# Patient Record
Sex: MALE | NOT HISPANIC OR LATINO | Employment: UNEMPLOYED | URBAN - METROPOLITAN AREA
[De-identification: names, ages, dates, MRNs, and addresses within clinical notes are randomized per-mention and may not be internally consistent; named-entity substitution may affect disease eponyms.]

---

## 2020-03-19 ENCOUNTER — APPOINTMENT (OUTPATIENT)
Dept: GENERAL RADIOLOGY | Facility: CLINIC | Age: 60
End: 2020-03-19
Attending: EMERGENCY MEDICINE
Payer: COMMERCIAL

## 2020-03-19 ENCOUNTER — HOSPITAL ENCOUNTER (EMERGENCY)
Facility: CLINIC | Age: 60
Discharge: HOME | End: 2020-03-19
Attending: EMERGENCY MEDICINE | Admitting: EMERGENCY MEDICINE
Payer: COMMERCIAL

## 2020-03-19 VITALS
DIASTOLIC BLOOD PRESSURE: 65 MMHG | SYSTOLIC BLOOD PRESSURE: 105 MMHG | HEIGHT: 70 IN | HEART RATE: 67 BPM | BODY MASS INDEX: 22.9 KG/M2 | WEIGHT: 160 LBS | OXYGEN SATURATION: 96 % | TEMPERATURE: 97.6 F | RESPIRATION RATE: 19 BRPM

## 2020-03-19 DIAGNOSIS — R07.89 ATYPICAL CHEST PAIN: ICD-10-CM

## 2020-03-19 DIAGNOSIS — E87.6 HYPOKALEMIA: ICD-10-CM

## 2020-03-19 DIAGNOSIS — R05.3 CHRONIC COUGH: ICD-10-CM

## 2020-03-19 LAB
ALBUMIN SERPL-MCNC: 3.7 G/DL (ref 3.4–5)
ALP SERPL-CCNC: 84 U/L (ref 40–150)
ALT SERPL W P-5'-P-CCNC: 88 U/L (ref 0–70)
ANION GAP SERPL CALCULATED.3IONS-SCNC: 9 MMOL/L (ref 3–14)
AST SERPL W P-5'-P-CCNC: 143 U/L (ref 0–45)
BASOPHILS # BLD AUTO: 0.1 10E9/L (ref 0–0.2)
BASOPHILS NFR BLD AUTO: 2.5 %
BILIRUB SERPL-MCNC: 0.2 MG/DL (ref 0.2–1.3)
BUN SERPL-MCNC: 7 MG/DL (ref 7–30)
CALCIUM SERPL-MCNC: 8.4 MG/DL (ref 8.5–10.1)
CHLORIDE SERPL-SCNC: 105 MMOL/L (ref 94–109)
CO2 SERPL-SCNC: 26 MMOL/L (ref 20–32)
CREAT SERPL-MCNC: 0.57 MG/DL (ref 0.66–1.25)
DIFFERENTIAL METHOD BLD: ABNORMAL
EOSINOPHIL # BLD AUTO: 0.1 10E9/L (ref 0–0.7)
EOSINOPHIL NFR BLD AUTO: 1.6 %
ERYTHROCYTE [DISTWIDTH] IN BLOOD BY AUTOMATED COUNT: 15.1 % (ref 10–15)
GFR SERPL CREATININE-BSD FRML MDRD: >90 ML/MIN/{1.73_M2}
GLUCOSE SERPL-MCNC: 116 MG/DL (ref 70–99)
HCT VFR BLD AUTO: 39.5 % (ref 40–53)
HGB BLD-MCNC: 13.1 G/DL (ref 13.3–17.7)
IMM GRANULOCYTES # BLD: 0 10E9/L (ref 0–0.4)
IMM GRANULOCYTES NFR BLD: 0.3 %
INTERPRETATION ECG - MUSE: NORMAL
LIPASE SERPL-CCNC: 902 U/L (ref 73–393)
LYMPHOCYTES # BLD AUTO: 1.7 10E9/L (ref 0.8–5.3)
LYMPHOCYTES NFR BLD AUTO: 52.2 %
MCH RBC QN AUTO: 31.7 PG (ref 26.5–33)
MCHC RBC AUTO-ENTMCNC: 33.2 G/DL (ref 31.5–36.5)
MCV RBC AUTO: 96 FL (ref 78–100)
MONOCYTES # BLD AUTO: 0.2 10E9/L (ref 0–1.3)
MONOCYTES NFR BLD AUTO: 6.3 %
NEUTROPHILS # BLD AUTO: 1.2 10E9/L (ref 1.6–8.3)
NEUTROPHILS NFR BLD AUTO: 37.1 %
NRBC # BLD AUTO: 0 10*3/UL
NRBC BLD AUTO-RTO: 0 /100
PLATELET # BLD AUTO: 233 10E9/L (ref 150–450)
POTASSIUM SERPL-SCNC: 2.6 MMOL/L (ref 3.4–5.3)
POTASSIUM SERPL-SCNC: 3.5 MMOL/L (ref 3.4–5.3)
PROT SERPL-MCNC: 8 G/DL (ref 6.8–8.8)
RBC # BLD AUTO: 4.13 10E12/L (ref 4.4–5.9)
SODIUM SERPL-SCNC: 140 MMOL/L (ref 133–144)
TROPONIN I SERPL-MCNC: <0.015 UG/L (ref 0–0.04)
TROPONIN I SERPL-MCNC: <0.015 UG/L (ref 0–0.04)
WBC # BLD AUTO: 3.2 10E9/L (ref 4–11)

## 2020-03-19 PROCEDURE — 80053 COMPREHEN METABOLIC PANEL: CPT | Performed by: EMERGENCY MEDICINE

## 2020-03-19 PROCEDURE — 96365 THER/PROPH/DIAG IV INF INIT: CPT | Performed by: EMERGENCY MEDICINE

## 2020-03-19 PROCEDURE — 84484 ASSAY OF TROPONIN QUANT: CPT | Performed by: EMERGENCY MEDICINE

## 2020-03-19 PROCEDURE — 83690 ASSAY OF LIPASE: CPT | Performed by: EMERGENCY MEDICINE

## 2020-03-19 PROCEDURE — 25000128 H RX IP 250 OP 636: Performed by: EMERGENCY MEDICINE

## 2020-03-19 PROCEDURE — 99285 EMERGENCY DEPT VISIT HI MDM: CPT | Mod: 25 | Performed by: EMERGENCY MEDICINE

## 2020-03-19 PROCEDURE — 71045 X-RAY EXAM CHEST 1 VIEW: CPT

## 2020-03-19 PROCEDURE — 25000132 ZZH RX MED GY IP 250 OP 250 PS 637: Performed by: EMERGENCY MEDICINE

## 2020-03-19 PROCEDURE — 85025 COMPLETE CBC W/AUTO DIFF WBC: CPT | Performed by: EMERGENCY MEDICINE

## 2020-03-19 PROCEDURE — 84132 ASSAY OF SERUM POTASSIUM: CPT | Performed by: EMERGENCY MEDICINE

## 2020-03-19 PROCEDURE — 93010 ELECTROCARDIOGRAM REPORT: CPT | Mod: Z6 | Performed by: EMERGENCY MEDICINE

## 2020-03-19 PROCEDURE — 93005 ELECTROCARDIOGRAM TRACING: CPT | Performed by: EMERGENCY MEDICINE

## 2020-03-19 RX ORDER — POTASSIUM CHLORIDE 7.45 MG/ML
10 INJECTION INTRAVENOUS CONTINUOUS
Status: DISCONTINUED | OUTPATIENT
Start: 2020-03-19 | End: 2020-03-19 | Stop reason: HOSPADM

## 2020-03-19 RX ORDER — POTASSIUM CHLORIDE 20MEQ/15ML
40 LIQUID (ML) ORAL ONCE
Status: COMPLETED | OUTPATIENT
Start: 2020-03-19 | End: 2020-03-19

## 2020-03-19 RX ADMIN — POTASSIUM CHLORIDE 10 MEQ: 7.46 INJECTION, SOLUTION INTRAVENOUS at 02:11

## 2020-03-19 RX ADMIN — POTASSIUM CHLORIDE 40 MEQ: 20 SOLUTION ORAL at 01:19

## 2020-03-19 ASSESSMENT — MIFFLIN-ST. JEOR: SCORE: 1547.01

## 2020-03-19 NOTE — ED AVS SNAPSHOT
[facility], [place], Emergency Department  [address] [place] [id number]  Phone:  [phone]                                    Dallas Deluca   MRN: [id number]    Department:  [facility], Emergency Department   Date of Visit:  3/19/2020           After Visit Summary Signature Page    I have received my discharge instructions, and my questions have been answered. I have discussed any challenges I see with this plan with the nurse or doctor.    ..........................................................................................................................................  Patient/Patient Representative Signature      ..........................................................................................................................................  Patient Representative Print Name and Relationship to Patient    ..................................................               ................................................  Date                                   Time    ..........................................................................................................................................  Reviewed by Signature/Title    ...................................................              ..............................................  Date                                               Time          22EPIC Rev 08/18

## 2020-03-19 NOTE — DISCHARGE INSTRUCTIONS
Continue your potassium supplements.     Please make an appointment to follow up with Your Primary Care Provider in 2-4 days.     Return to the ED if you are having worsening symptoms, or any urgent/life-threatening concerns.

## 2020-03-19 NOTE — ED NOTES
Bed: ED25  Expected date:   Expected time:   Means of arrival:   Comments:  SP20  69 yr M  Chest pain

## 2020-03-19 NOTE — ED PROVIDER NOTES
"  History     Chief Complaint   Patient presents with     Chest Pain     Cough     HPI  Dallas Deluca is a 59 year old male with PMH notable for pancreatitis, hypokalemia, COPD who presents to the ED with chest pain. Patient reports pain started this past morning, has been constant. No clear aggravating or alleviating factors. Pain is reproduced when pushing on his chest. Patient reports chronic cough for at least several weeks, unchanged. No fevers. He notes recent admission for pancreatitis, which is now resolved. No abdominal pain nor vomiting. Breathing feels mildly short.     Past Medical History  History reviewed. No pertinent past medical history.  History reviewed. No pertinent surgical history.  No current outpatient medications on file.    Allergies   Allergen Reactions     Lisinopril      Morphine      Past medical history, past surgical history, medications, and allergies were reviewed with the patient. Additional pertinent items: pancreatitis, COPD, hypokalemia    Family History  History reviewed. No pertinent family history.  Family history was reviewed with the patient. Additional pertinent items: None    Social History  Social History     Tobacco Use     Smoking status: Current Every Day Smoker     Packs/day: 0.25     Smokeless tobacco: Never Used     Tobacco comment: 3 cigarettes/day   Substance Use Topics     Alcohol use: Yes     Alcohol/week: 10.0 standard drinks     Types: 10 Cans of beer per week     Drug use: Never      Social history was reviewed with the patient. Additional pertinent items: None    Review of Systems  A complete review of systems was performed with pertinent positives and negatives noted in the HPI, and all other systems negative.     Physical Exam   BP: (!) 144/98  Pulse: 62  Heart Rate: 86  Temp: 97.6  F (36.4  C)  Resp: 16  Height: 177.8 cm (5' 10\")  Weight: 72.6 kg (160 lb)  SpO2: 98 %    Physical Exam  General: no acute distress. Appears stated age.   HENT: MMM, no " oropharyngeal lesions  Eyes: PERRL, normal sclerae  Cardio: regular rate. Regular rhythm. No murmur, no rubs. Extremities well perfused  Resp: Normal work of breathing, clear breath sounds  Chest/Back: no visual signs of trauma, no CVA tenderness. There is reproduction of symptoms with anterior chest palpation.   Abdomen: no tenderness, non-distended, no rebound, no guarding  Neuro: alert and fully oriented. CN II-XII grossly intact. Grossly normal strength and sensation in all extremities.   MSK: no deformities.   Integumentary/Skin: no rash visualized, normal color  Psych: normal affect, normal behavior    ED Course      Procedures             EKG Interpretation:      Interpreted by Stephen Nuno MD  Time reviewed: 0114  Symptoms at time of EKG: chest pain and cough   Rhythm: normal sinus   Rate: normal  Axis: normal  Ectopy: none  Conduction: normal  ST Segments/ T Waves: No ST-T wave changes  Q Waves: none  Comparison to prior: Unchanged from 11/11/2007    Clinical Impression: normal EKG     Labs Ordered and Resulted from Time of ED Arrival Up to the Time of Departure from the ED   COMPREHENSIVE METABOLIC PANEL - Abnormal; Notable for the following components:       Result Value    Potassium 2.6 (*)     Glucose 116 (*)     Creatinine 0.57 (*)     Calcium 8.4 (*)     ALT 88 (*)      (*)     All other components within normal limits   CBC WITH PLATELETS DIFFERENTIAL - Abnormal; Notable for the following components:    WBC 3.2 (*)     RBC Count 4.13 (*)     Hemoglobin 13.1 (*)     Hematocrit 39.5 (*)     RDW 15.1 (*)     Absolute Neutrophil 1.2 (*)     All other components within normal limits   LIPASE - Abnormal; Notable for the following components:    Lipase 902 (*)     All other components within normal limits   TROPONIN I   POTASSIUM   TROPONIN I   CARDIAC CONTINUOUS MONITORING     XR Chest Port 1 View   Final Result   IMPRESSION: Cardiomediastinal silhouette within normal limits. No focal  consolidation or pleural effusion. Remote rib fractures.             Assessments & Plan (with Medical Decision Making)   Patient presenting with chest pain and cough. Vitals in the ED unremarkable. Nursing notes reviewed. Initial differential diagnosis includes but not limited to ACS, GERD, COPD, pancreatitis, gastritis.     ECG showed NSR without evidence of acute ischemia, troponin negative x2 - ACS very unlikely. CXR without evidence of pneumonia, PTX, nor other visualized acute pathology. Normal work of breathing and clear lungs on exam.     Labs notable for K 2.6 - supplementation started. Patient does chronically have low potassium and reports taking supplements for it. Repeat K 3.5.     After counseling on the diagnosis, work-up, and treatment plan, the patient was discharged. The patient was advised to follow-up with primary care in a few days. The patient was advised to return to the ED if worsening symptoms, or if there are any urgent/life-threatening concerns.     Final diagnoses:   Atypical chest pain   Chronic cough   Hypokalemia     There are no discharge medications for this patient.      --  Stephen Nuno MD   Emergency Medicine   [facility], [facility] EMERGENCY DEPARTMENT  3/19/2020     Stephen Nuno MD  03/19/20 1715

## 2021-07-20 PROBLEM — Z87.898 HISTORY OF SEIZURE: Status: ACTIVE | Noted: 2017-05-25

## 2021-07-20 PROBLEM — D33.2 BRAIN TUMOR (BENIGN) (H): Status: ACTIVE | Noted: 2017-05-25

## 2021-07-20 PROBLEM — F10.20 ALCOHOL USE DISORDER, SEVERE, DEPENDENCE (H): Status: ACTIVE | Noted: 2017-05-24

## 2022-04-04 ENCOUNTER — ALLIED HEALTH/NURSE VISIT (OUTPATIENT)
Dept: FAMILY MEDICINE | Facility: CLINIC | Age: 62
End: 2022-04-04
Payer: COMMERCIAL

## 2022-04-04 DIAGNOSIS — Z71.9 COUNSELED BY NURSE: Primary | ICD-10-CM

## 2022-04-04 PROCEDURE — 99207 PR NO CHARGE NURSE ONLY: CPT

## 2022-04-04 NOTE — PROGRESS NOTES
Some time over the weekend, pt suffered a petit mal seizure in the first floor entryway of the Shelter. Shift notes included a note requesting an RN visit today.    This RN visited pt in his room. Pt was relaxing in bed (watching TV). Pt denied needing any medical assistance and endorsed feeling well today. Pt related that he had had a petit mal seizure, but he's on medication for this and has been managing it since the 1990's.    Pt expressed appreciation for the visit. RN and pt chatted for 20 min. Pt seems to be managing his seizure medication regimen effectively overall, and is in good spirits: he's going to move back in with his wife soon.    Markie Sosa RN  [facility] RN Hub

## 2022-12-06 ENCOUNTER — APPOINTMENT (OUTPATIENT)
Dept: GENERAL RADIOLOGY | Facility: CLINIC | Age: 62
End: 2022-12-06
Attending: EMERGENCY MEDICINE
Payer: COMMERCIAL

## 2022-12-06 ENCOUNTER — APPOINTMENT (OUTPATIENT)
Dept: CT IMAGING | Facility: CLINIC | Age: 62
End: 2022-12-06
Attending: INTERNAL MEDICINE
Payer: COMMERCIAL

## 2022-12-06 ENCOUNTER — HOSPITAL ENCOUNTER (INPATIENT)
Facility: CLINIC | Age: 62
LOS: 19 days | Discharge: HOME | End: 2022-12-25
Attending: EMERGENCY MEDICINE | Admitting: INTERNAL MEDICINE
Payer: COMMERCIAL

## 2022-12-06 ENCOUNTER — APPOINTMENT (OUTPATIENT)
Dept: CT IMAGING | Facility: CLINIC | Age: 62
End: 2022-12-06
Attending: EMERGENCY MEDICINE
Payer: COMMERCIAL

## 2022-12-06 ENCOUNTER — APPOINTMENT (OUTPATIENT)
Dept: CARDIOLOGY | Facility: CLINIC | Age: 62
End: 2022-12-06
Attending: INTERNAL MEDICINE
Payer: COMMERCIAL

## 2022-12-06 DIAGNOSIS — K21.9 GASTROESOPHAGEAL REFLUX DISEASE, UNSPECIFIED WHETHER ESOPHAGITIS PRESENT: ICD-10-CM

## 2022-12-06 DIAGNOSIS — F10.20 ALCOHOL USE DISORDER, SEVERE, DEPENDENCE (H): ICD-10-CM

## 2022-12-06 DIAGNOSIS — K22.89 ESOPHAGEAL THICKENING: ICD-10-CM

## 2022-12-06 DIAGNOSIS — R07.89 ATYPICAL CHEST PAIN: ICD-10-CM

## 2022-12-06 DIAGNOSIS — K86.89 PANCREATIC INSUFFICIENCY: ICD-10-CM

## 2022-12-06 DIAGNOSIS — E87.29 ALCOHOLIC KETOACIDOSIS: ICD-10-CM

## 2022-12-06 DIAGNOSIS — E61.1 IRON DEFICIENCY: Primary | ICD-10-CM

## 2022-12-06 DIAGNOSIS — Z11.52 ENCOUNTER FOR SCREENING LABORATORY TESTING FOR SEVERE ACUTE RESPIRATORY SYNDROME CORONAVIRUS 2 (SARS-COV-2): ICD-10-CM

## 2022-12-06 DIAGNOSIS — E11.69 TYPE 2 DIABETES MELLITUS WITH OTHER SPECIFIED COMPLICATION, WITHOUT LONG-TERM CURRENT USE OF INSULIN (H): ICD-10-CM

## 2022-12-06 LAB
ALBUMIN SERPL BCG-MCNC: 4.2 G/DL (ref 3.5–5.2)
ALBUMIN UR-MCNC: 30 MG/DL
ALP SERPL-CCNC: 110 U/L (ref 40–129)
ALT SERPL W P-5'-P-CCNC: 52 U/L (ref 10–50)
AMPHETAMINES UR QL SCN: NORMAL
ANION GAP SERPL CALCULATED.3IONS-SCNC: 12 MMOL/L (ref 3–14)
ANION GAP SERPL CALCULATED.3IONS-SCNC: 19 MMOL/L (ref 7–15)
ANION GAP SERPL CALCULATED.3IONS-SCNC: 35 MMOL/L (ref 7–15)
APPEARANCE UR: CLEAR
AST SERPL W P-5'-P-CCNC: 123 U/L (ref 10–50)
ATRIAL RATE - MUSE: 74 BPM
BARBITURATES UR QL SCN: NORMAL
BASOPHILS # BLD AUTO: 0.1 10E3/UL (ref 0–0.2)
BASOPHILS NFR BLD AUTO: 2 %
BENZODIAZ UR QL SCN: NORMAL
BILIRUB SERPL-MCNC: 0.7 MG/DL
BILIRUB UR QL STRIP: NEGATIVE
BUN SERPL-MCNC: 11.2 MG/DL (ref 8–23)
BUN SERPL-MCNC: 8 MG/DL (ref 7–30)
BUN SERPL-MCNC: 8.8 MG/DL (ref 8–23)
BZE UR QL SCN: NORMAL
CALCIUM SERPL-MCNC: 7.4 MG/DL (ref 8.8–10.2)
CALCIUM SERPL-MCNC: 7.8 MG/DL (ref 8.5–10.1)
CALCIUM SERPL-MCNC: 8.7 MG/DL (ref 8.8–10.2)
CANNABINOIDS UR QL SCN: NORMAL
CHLORIDE BLD-SCNC: 104 MMOL/L (ref 94–109)
CHLORIDE SERPL-SCNC: 101 MMOL/L (ref 98–107)
CHLORIDE SERPL-SCNC: 96 MMOL/L (ref 98–107)
CO2 SERPL-SCNC: 21 MMOL/L (ref 20–32)
COLOR UR AUTO: YELLOW
CREAT SERPL-MCNC: 0.53 MG/DL (ref 0.66–1.25)
CREAT SERPL-MCNC: 0.56 MG/DL (ref 0.67–1.17)
CREAT SERPL-MCNC: 0.57 MG/DL (ref 0.67–1.17)
DEPRECATED HCO3 PLAS-SCNC: 12 MMOL/L (ref 22–29)
DEPRECATED HCO3 PLAS-SCNC: 16 MMOL/L (ref 22–29)
DIASTOLIC BLOOD PRESSURE - MUSE: NORMAL MMHG
EOSINOPHIL # BLD AUTO: 0 10E3/UL (ref 0–0.7)
EOSINOPHIL NFR BLD AUTO: 0 %
ERYTHROCYTE [DISTWIDTH] IN BLOOD BY AUTOMATED COUNT: 20.6 % (ref 10–15)
ETHANOL SERPL-MCNC: 0.33 G/DL
GFR SERPL CREATININE-BSD FRML MDRD: >90 ML/MIN/1.73M2
GLUCOSE BLD-MCNC: 172 MG/DL (ref 70–99)
GLUCOSE BLDC GLUCOMTR-MCNC: 122 MG/DL (ref 70–99)
GLUCOSE SERPL-MCNC: 101 MG/DL (ref 70–99)
GLUCOSE SERPL-MCNC: 155 MG/DL (ref 70–99)
GLUCOSE UR STRIP-MCNC: NEGATIVE MG/DL
HCT VFR BLD AUTO: 32.4 % (ref 40–53)
HGB BLD-MCNC: 9.4 G/DL (ref 13.3–17.7)
HGB UR QL STRIP: NEGATIVE
HOLD SPECIMEN: NORMAL
IMM GRANULOCYTES # BLD: 0 10E3/UL
IMM GRANULOCYTES NFR BLD: 1 %
INTERPRETATION ECG - MUSE: NORMAL
KETONES BLD-SCNC: 6.3 MMOL/L (ref 0–0.6)
KETONES UR STRIP-MCNC: 100 MG/DL
LEUKOCYTE ESTERASE UR QL STRIP: NEGATIVE
LEVETIRACETAM SERPL-MCNC: <2 ΜG/ML (ref 10–40)
LEVETIRACETAM SERPL-MCNC: <2 ΜG/ML (ref 10–40)
LIPASE SERPL-CCNC: 19 U/L (ref 13–60)
LVEF ECHO: NORMAL
LYMPHOCYTES # BLD AUTO: 1.7 10E3/UL (ref 0.8–5.3)
LYMPHOCYTES NFR BLD AUTO: 27 %
MAGNESIUM SERPL-MCNC: 1.3 MG/DL (ref 1.6–2.3)
MAGNESIUM SERPL-MCNC: 2 MG/DL (ref 1.7–2.3)
MCH RBC QN AUTO: 22.2 PG (ref 26.5–33)
MCHC RBC AUTO-ENTMCNC: 29 G/DL (ref 31.5–36.5)
MCV RBC AUTO: 76 FL (ref 78–100)
MONOCYTES # BLD AUTO: 0.3 10E3/UL (ref 0–1.3)
MONOCYTES NFR BLD AUTO: 5 %
MUCOUS THREADS #/AREA URNS LPF: PRESENT /LPF
NEUTROPHILS # BLD AUTO: 4 10E3/UL (ref 1.6–8.3)
NEUTROPHILS NFR BLD AUTO: 65 %
NITRATE UR QL: NEGATIVE
NRBC # BLD AUTO: 0 10E3/UL
NRBC BLD AUTO-RTO: 0 /100
OPIATES UR QL SCN: NORMAL
P AXIS - MUSE: NORMAL DEGREES
PH UR STRIP: 5.5 [PH] (ref 5–7)
PHOSPHATE SERPL-MCNC: 1.8 MG/DL (ref 2.5–4.5)
PHOSPHATE SERPL-MCNC: 4 MG/DL (ref 2.5–4.5)
PLATELET # BLD AUTO: 245 10E3/UL (ref 150–450)
POTASSIUM BLD-SCNC: 2.8 MMOL/L (ref 3.4–5.3)
POTASSIUM BLD-SCNC: 3.2 MMOL/L (ref 3.4–5.3)
POTASSIUM SERPL-SCNC: 3.4 MMOL/L (ref 3.4–5.3)
POTASSIUM SERPL-SCNC: 3.5 MMOL/L (ref 3.4–5.3)
PR INTERVAL - MUSE: NORMAL MS
PROT SERPL-MCNC: 8.8 G/DL (ref 6.4–8.3)
QRS DURATION - MUSE: 104 MS
QT - MUSE: 462 MS
QTC - MUSE: 512 MS
R AXIS - MUSE: 1 DEGREES
RBC # BLD AUTO: 4.24 10E6/UL (ref 4.4–5.9)
RBC URINE: 1 /HPF
SARS-COV-2 RNA RESP QL NAA+PROBE: NEGATIVE
SODIUM SERPL-SCNC: 136 MMOL/L (ref 136–145)
SODIUM SERPL-SCNC: 137 MMOL/L (ref 133–144)
SODIUM SERPL-SCNC: 143 MMOL/L (ref 136–145)
SP GR UR STRIP: 1.02 (ref 1–1.03)
SYSTOLIC BLOOD PRESSURE - MUSE: NORMAL MMHG
T AXIS - MUSE: 53 DEGREES
TROPONIN T SERPL HS-MCNC: 17 NG/L
UROBILINOGEN UR STRIP-MCNC: 2 MG/DL
VENTRICULAR RATE- MUSE: 74 BPM
WBC # BLD AUTO: 6.2 10E3/UL (ref 4–11)
WBC URINE: <1 /HPF

## 2022-12-06 PROCEDURE — 84484 ASSAY OF TROPONIN QUANT: CPT | Performed by: EMERGENCY MEDICINE

## 2022-12-06 PROCEDURE — 82010 KETONE BODYS QUAN: CPT | Performed by: EMERGENCY MEDICINE

## 2022-12-06 PROCEDURE — 96375 TX/PRO/DX INJ NEW DRUG ADDON: CPT

## 2022-12-06 PROCEDURE — 99285 EMERGENCY DEPT VISIT HI MDM: CPT | Mod: 25

## 2022-12-06 PROCEDURE — 96361 HYDRATE IV INFUSION ADD-ON: CPT

## 2022-12-06 PROCEDURE — 84132 ASSAY OF SERUM POTASSIUM: CPT | Performed by: INTERNAL MEDICINE

## 2022-12-06 PROCEDURE — 99223 1ST HOSP IP/OBS HIGH 75: CPT | Mod: AI | Performed by: INTERNAL MEDICINE

## 2022-12-06 PROCEDURE — 258N000003 HC RX IP 258 OP 636: Performed by: EMERGENCY MEDICINE

## 2022-12-06 PROCEDURE — 83735 ASSAY OF MAGNESIUM: CPT | Performed by: INTERNAL MEDICINE

## 2022-12-06 PROCEDURE — 71250 CT THORAX DX C-: CPT

## 2022-12-06 PROCEDURE — 81001 URINALYSIS AUTO W/SCOPE: CPT | Performed by: EMERGENCY MEDICINE

## 2022-12-06 PROCEDURE — 250N000013 HC RX MED GY IP 250 OP 250 PS 637: Performed by: EMERGENCY MEDICINE

## 2022-12-06 PROCEDURE — C9113 INJ PANTOPRAZOLE SODIUM, VIA: HCPCS | Performed by: EMERGENCY MEDICINE

## 2022-12-06 PROCEDURE — 93306 TTE W/DOPPLER COMPLETE: CPT | Mod: 26 | Performed by: STUDENT IN AN ORGANIZED HEALTH CARE EDUCATION/TRAINING PROGRAM

## 2022-12-06 PROCEDURE — U0005 INFEC AGEN DETEC AMPLI PROBE: HCPCS | Performed by: EMERGENCY MEDICINE

## 2022-12-06 PROCEDURE — 71045 X-RAY EXAM CHEST 1 VIEW: CPT | Mod: 26 | Performed by: RADIOLOGY

## 2022-12-06 PROCEDURE — 36415 COLL VENOUS BLD VENIPUNCTURE: CPT | Performed by: INTERNAL MEDICINE

## 2022-12-06 PROCEDURE — 71045 X-RAY EXAM CHEST 1 VIEW: CPT

## 2022-12-06 PROCEDURE — 250N000009 HC RX 250: Performed by: EMERGENCY MEDICINE

## 2022-12-06 PROCEDURE — 83036 HEMOGLOBIN GLYCOSYLATED A1C: CPT | Performed by: INTERNAL MEDICINE

## 2022-12-06 PROCEDURE — 36415 COLL VENOUS BLD VENIPUNCTURE: CPT | Performed by: EMERGENCY MEDICINE

## 2022-12-06 PROCEDURE — 83735 ASSAY OF MAGNESIUM: CPT | Performed by: EMERGENCY MEDICINE

## 2022-12-06 PROCEDURE — 93005 ELECTROCARDIOGRAM TRACING: CPT

## 2022-12-06 PROCEDURE — 85025 COMPLETE CBC W/AUTO DIFF WBC: CPT | Performed by: EMERGENCY MEDICINE

## 2022-12-06 PROCEDURE — 84100 ASSAY OF PHOSPHORUS: CPT | Performed by: INTERNAL MEDICINE

## 2022-12-06 PROCEDURE — 250N000013 HC RX MED GY IP 250 OP 250 PS 637: Performed by: INTERNAL MEDICINE

## 2022-12-06 PROCEDURE — 83690 ASSAY OF LIPASE: CPT | Performed by: EMERGENCY MEDICINE

## 2022-12-06 PROCEDURE — 80307 DRUG TEST PRSMV CHEM ANLYZR: CPT | Performed by: EMERGENCY MEDICINE

## 2022-12-06 PROCEDURE — C9803 HOPD COVID-19 SPEC COLLECT: HCPCS

## 2022-12-06 PROCEDURE — 250N000011 HC RX IP 250 OP 636: Performed by: EMERGENCY MEDICINE

## 2022-12-06 PROCEDURE — 80177 DRUG SCRN QUAN LEVETIRACETAM: CPT | Performed by: EMERGENCY MEDICINE

## 2022-12-06 PROCEDURE — 82077 ASSAY SPEC XCP UR&BREATH IA: CPT | Performed by: EMERGENCY MEDICINE

## 2022-12-06 PROCEDURE — 80053 COMPREHEN METABOLIC PANEL: CPT | Performed by: EMERGENCY MEDICINE

## 2022-12-06 PROCEDURE — 71250 CT THORAX DX C-: CPT | Mod: 26 | Performed by: RADIOLOGY

## 2022-12-06 PROCEDURE — 120N000002 HC R&B MED SURG/OB UMMC

## 2022-12-06 PROCEDURE — 93010 ELECTROCARDIOGRAM REPORT: CPT | Performed by: EMERGENCY MEDICINE

## 2022-12-06 PROCEDURE — 80048 BASIC METABOLIC PNL TOTAL CA: CPT | Performed by: INTERNAL MEDICINE

## 2022-12-06 PROCEDURE — 70450 CT HEAD/BRAIN W/O DYE: CPT | Mod: 26 | Performed by: RADIOLOGY

## 2022-12-06 PROCEDURE — 99285 EMERGENCY DEPT VISIT HI MDM: CPT | Mod: 25 | Performed by: EMERGENCY MEDICINE

## 2022-12-06 PROCEDURE — 93306 TTE W/DOPPLER COMPLETE: CPT

## 2022-12-06 PROCEDURE — 80177 DRUG SCRN QUAN LEVETIRACETAM: CPT | Performed by: INTERNAL MEDICINE

## 2022-12-06 PROCEDURE — HZ2ZZZZ DETOXIFICATION SERVICES FOR SUBSTANCE ABUSE TREATMENT: ICD-10-PCS | Performed by: INTERNAL MEDICINE

## 2022-12-06 PROCEDURE — 70450 CT HEAD/BRAIN W/O DYE: CPT

## 2022-12-06 PROCEDURE — 96374 THER/PROPH/DIAG INJ IV PUSH: CPT

## 2022-12-06 RX ORDER — LANOLIN ALCOHOL/MO/W.PET/CERES
100 CREAM (GRAM) TOPICAL DAILY
COMMUNITY

## 2022-12-06 RX ORDER — SODIUM CHLORIDE 9 MG/ML
INJECTION, SOLUTION INTRAVENOUS CONTINUOUS
Status: DISCONTINUED | OUTPATIENT
Start: 2022-12-06 | End: 2022-12-06

## 2022-12-06 RX ORDER — LEVETIRACETAM 500 MG/1
2000 TABLET ORAL 2 TIMES DAILY
Status: DISCONTINUED | OUTPATIENT
Start: 2022-12-06 | End: 2022-12-25 | Stop reason: HOSPADM

## 2022-12-06 RX ORDER — TAMSULOSIN HYDROCHLORIDE 0.4 MG/1
0.4 CAPSULE ORAL DAILY
COMMUNITY

## 2022-12-06 RX ORDER — LEVETIRACETAM 1000 MG/1
2000 TABLET ORAL 2 TIMES DAILY
COMMUNITY

## 2022-12-06 RX ORDER — NICOTINE POLACRILEX 4 MG
15-30 LOZENGE BUCCAL
Status: DISCONTINUED | OUTPATIENT
Start: 2022-12-06 | End: 2022-12-25 | Stop reason: HOSPADM

## 2022-12-06 RX ORDER — OLANZAPINE 5 MG/1
5-10 TABLET, ORALLY DISINTEGRATING ORAL EVERY 6 HOURS PRN
Status: DISCONTINUED | OUTPATIENT
Start: 2022-12-06 | End: 2022-12-25 | Stop reason: HOSPADM

## 2022-12-06 RX ORDER — ALBUTEROL SULFATE 90 UG/1
2 AEROSOL, METERED RESPIRATORY (INHALATION) EVERY 6 HOURS PRN
Status: DISCONTINUED | OUTPATIENT
Start: 2022-12-06 | End: 2022-12-25 | Stop reason: HOSPADM

## 2022-12-06 RX ORDER — ENOXAPARIN SODIUM 100 MG/ML
40 INJECTION SUBCUTANEOUS EVERY 24 HOURS
Status: DISCONTINUED | OUTPATIENT
Start: 2022-12-06 | End: 2022-12-14

## 2022-12-06 RX ORDER — GABAPENTIN 300 MG/1
300 CAPSULE ORAL 3 TIMES DAILY
COMMUNITY

## 2022-12-06 RX ORDER — FOLIC ACID 1 MG/1
1 TABLET ORAL DAILY
Status: DISCONTINUED | OUTPATIENT
Start: 2022-12-07 | End: 2022-12-25 | Stop reason: HOSPADM

## 2022-12-06 RX ORDER — ACETAMINOPHEN 325 MG/1
650 TABLET ORAL EVERY 6 HOURS PRN
Status: DISCONTINUED | OUTPATIENT
Start: 2022-12-06 | End: 2022-12-25 | Stop reason: HOSPADM

## 2022-12-06 RX ORDER — TAMSULOSIN HYDROCHLORIDE 0.4 MG/1
0.4 CAPSULE ORAL DAILY
Status: DISCONTINUED | OUTPATIENT
Start: 2022-12-07 | End: 2022-12-25 | Stop reason: HOSPADM

## 2022-12-06 RX ORDER — HALOPERIDOL 5 MG/ML
2.5-5 INJECTION INTRAMUSCULAR EVERY 6 HOURS PRN
Status: DISCONTINUED | OUTPATIENT
Start: 2022-12-06 | End: 2022-12-25 | Stop reason: HOSPADM

## 2022-12-06 RX ORDER — PANTOPRAZOLE SODIUM 40 MG/1
40 TABLET, DELAYED RELEASE ORAL
Status: DISCONTINUED | OUTPATIENT
Start: 2022-12-07 | End: 2022-12-25 | Stop reason: HOSPADM

## 2022-12-06 RX ORDER — PANTOPRAZOLE SODIUM 40 MG/1
40 TABLET, DELAYED RELEASE ORAL DAILY
COMMUNITY

## 2022-12-06 RX ORDER — CLONIDINE HYDROCHLORIDE 0.1 MG/1
0.1 TABLET ORAL EVERY 8 HOURS
Status: DISCONTINUED | OUTPATIENT
Start: 2022-12-06 | End: 2022-12-16

## 2022-12-06 RX ORDER — DIAZEPAM 10 MG
10 TABLET ORAL EVERY 30 MIN PRN
Status: DISCONTINUED | OUTPATIENT
Start: 2022-12-06 | End: 2022-12-16

## 2022-12-06 RX ORDER — MULTIPLE VITAMINS W/ MINERALS TAB 9MG-400MCG
1 TAB ORAL DAILY
Status: DISCONTINUED | OUTPATIENT
Start: 2022-12-07 | End: 2022-12-25 | Stop reason: HOSPADM

## 2022-12-06 RX ORDER — DIAZEPAM 10 MG/2ML
5-10 INJECTION, SOLUTION INTRAMUSCULAR; INTRAVENOUS EVERY 30 MIN PRN
Status: DISCONTINUED | OUTPATIENT
Start: 2022-12-06 | End: 2022-12-16

## 2022-12-06 RX ORDER — MULTIPLE VITAMINS W/ MINERALS TAB 9MG-400MCG
1 TAB ORAL DAILY
COMMUNITY

## 2022-12-06 RX ORDER — FOLIC ACID 1 MG/1
1 TABLET ORAL DAILY
COMMUNITY

## 2022-12-06 RX ORDER — METFORMIN HCL 500 MG
2000 TABLET, EXTENDED RELEASE 24 HR ORAL
COMMUNITY

## 2022-12-06 RX ORDER — DEXTROSE MONOHYDRATE 25 G/50ML
25-50 INJECTION, SOLUTION INTRAVENOUS
Status: DISCONTINUED | OUTPATIENT
Start: 2022-12-06 | End: 2022-12-25 | Stop reason: HOSPADM

## 2022-12-06 RX ORDER — ALBUTEROL SULFATE 90 UG/1
2 AEROSOL, METERED RESPIRATORY (INHALATION) EVERY 6 HOURS PRN
COMMUNITY

## 2022-12-06 RX ORDER — LIDOCAINE 40 MG/G
CREAM TOPICAL
Status: DISCONTINUED | OUTPATIENT
Start: 2022-12-06 | End: 2022-12-25 | Stop reason: HOSPADM

## 2022-12-06 RX ORDER — FERROUS SULFATE 325(65) MG
325 TABLET ORAL 2 TIMES DAILY
Status: ON HOLD | COMMUNITY
End: 2022-12-25

## 2022-12-06 RX ORDER — ONDANSETRON 2 MG/ML
4 INJECTION INTRAMUSCULAR; INTRAVENOUS EVERY 30 MIN PRN
Status: DISCONTINUED | OUTPATIENT
Start: 2022-12-06 | End: 2022-12-06

## 2022-12-06 RX ORDER — ACETAMINOPHEN 650 MG/1
650 SUPPOSITORY RECTAL EVERY 6 HOURS PRN
Status: DISCONTINUED | OUTPATIENT
Start: 2022-12-06 | End: 2022-12-25 | Stop reason: HOSPADM

## 2022-12-06 RX ORDER — FLUMAZENIL 0.1 MG/ML
0.2 INJECTION, SOLUTION INTRAVENOUS
Status: DISCONTINUED | OUTPATIENT
Start: 2022-12-06 | End: 2022-12-25 | Stop reason: HOSPADM

## 2022-12-06 RX ADMIN — ONDANSETRON 4 MG: 2 INJECTION INTRAMUSCULAR; INTRAVENOUS at 17:49

## 2022-12-06 RX ADMIN — ONDANSETRON 4 MG: 2 INJECTION INTRAMUSCULAR; INTRAVENOUS at 07:57

## 2022-12-06 RX ADMIN — FOLIC ACID: 5 INJECTION, SOLUTION INTRAMUSCULAR; INTRAVENOUS; SUBCUTANEOUS at 11:26

## 2022-12-06 RX ADMIN — DIAZEPAM 10 MG: 10 TABLET ORAL at 21:10

## 2022-12-06 RX ADMIN — LEVETIRACETAM 2000 MG: 500 TABLET, FILM COATED ORAL at 20:48

## 2022-12-06 RX ADMIN — ACETAMINOPHEN 650 MG: 325 TABLET, FILM COATED ORAL at 21:10

## 2022-12-06 RX ADMIN — SODIUM CHLORIDE 1000 ML: 9 INJECTION, SOLUTION INTRAVENOUS at 07:56

## 2022-12-06 RX ADMIN — LIDOCAINE HYDROCHLORIDE 30 ML: 20 SOLUTION ORAL; TOPICAL at 07:57

## 2022-12-06 RX ADMIN — CLONIDINE HYDROCHLORIDE 0.1 MG: 0.1 TABLET ORAL at 20:49

## 2022-12-06 RX ADMIN — PANTOPRAZOLE SODIUM 40 MG: 40 INJECTION, POWDER, FOR SOLUTION INTRAVENOUS at 07:58

## 2022-12-06 ASSESSMENT — ACTIVITIES OF DAILY LIVING (ADL)
ADLS_ACUITY_SCORE: 35

## 2022-12-06 NOTE — LETTER
Recipient: [facility]          Sender: Shanel LooneyRAJ at [facility] [phone]          Date: December 11, 2022  Patient Name:  Dallas Deluca  Patient YOB: 1960  Routing Message: Please review for TCU placement. Patient is medically ready to discharge. Call with questions or concerns! Thank you.         The documents accompanying this notice contain confidential information belonging to the sender.  This information is intended only for the use of the individual or entity named above.  The authorized recipient of this information is prohibited from disclosing this information to any other party and is required to destroy the information after its stated need has been fulfilled, unless otherwise required by state law.    If you are not the intended recipient, you are hereby notified that any disclosure, copy, distribution or action taken in reliance on the contents of these documents is strictly prohibited.  If you have received this document in error, please return it by fax to [phone] with a note on the cover sheet explaining why you are returning it (e.g. not your patient, not your provider, etc.).  If you need further assistance, please call .  Documents may also be returned by mail to [employer], , [address], [place], [place] [address].
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
## 2022-12-06 NOTE — ED PROVIDER NOTES
Patient received as sign-out at change of shift.  Please see initial note for complete details.  62 year old male who presented to the ED with alcohol intoxication.  Awaiting laboratory results  Acute events during this shift: Patient with significant anion gap acidosis.  His alcohol level is elevated.  Ketones are elevated to 6.3.  He is not hypokalemic nor hypomagnesemic.  Given normal saline initially.  D5NS banana bag ordered.  He is clinically sobering appropriately.  With significant alcoholic ketoacidosis, will require admission to internal medicine for further management and correction of metabolic abnormalities.  The patient is anemic but his hemoglobin is consistent with recent results in the [facility] system.  He does have a prolonged QTc interval to 512 ms on the EKG.  Will admit with telemetry monitoring.     Ranjit Wolfe MD  12/06/22 8379

## 2022-12-06 NOTE — ED TRIAGE NOTES
[place] BIBA, [place], found vodka in his bag, told EMS he was having a seizure and to take him to the hospital, per pt had 4 drinks, stated he has had alcoholic pancreatitis in the past     Triage Assessment     Row Name 12/06/22 0615       Triage Assessment (Adult)    Airway WDL WDL       Respiratory WDL    Respiratory WDL WDL       Skin Circulation/Temperature WDL    Skin Circulation/Temperature WDL WDL       Cardiac WDL    Cardiac WDL WDL       Peripheral/Neurovascular WDL    Peripheral Neurovascular WDL WDL       Cognitive/Neuro/Behavioral WDL    Cognitive/Neuro/Behavioral WDL WDL
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
## 2022-12-06 NOTE — ED NOTES
Bed: ED22  Expected date: 12/6/22  Expected time: 6:12 AM  Means of arrival: Ambulance  Comments:  62 y/o female - states she's having a seizure

## 2022-12-06 NOTE — LETTER
Recipient: [facility] Admissions           Sender: Shanel LooneyRAJ at [facility] [phone]          Date: December 11, 2022  Patient Name:  Dallas Deluca  Patient YOB: 1960  Routing Message: Please review for TCU placement. Patient is interested in [facility]. He is medically ready to discharge. Please call with any questions or concerns.         The documents accompanying this notice contain confidential information belonging to the sender.  This information is intended only for the use of the individual or entity named above.  The authorized recipient of this information is prohibited from disclosing this information to any other party and is required to destroy the information after its stated need has been fulfilled, unless otherwise required by state law.    If you are not the intended recipient, you are hereby notified that any disclosure, copy, distribution or action taken in reliance on the contents of these documents is strictly prohibited.  If you have received this document in error, please return it by fax to [phone] with a note on the cover sheet explaining why you are returning it (e.g. not your patient, not your provider, etc.).  If you need further assistance, please call .  Documents may also be returned by mail to [employer], , [address], [place], [place] [address].

## 2022-12-07 ENCOUNTER — APPOINTMENT (OUTPATIENT)
Dept: PHYSICAL THERAPY | Facility: CLINIC | Age: 62
End: 2022-12-07
Attending: INTERNAL MEDICINE
Payer: COMMERCIAL

## 2022-12-07 LAB
ALBUMIN SERPL-MCNC: 2.6 G/DL (ref 3.4–5)
ALP SERPL-CCNC: 78 U/L (ref 40–150)
ALT SERPL W P-5'-P-CCNC: 41 U/L (ref 0–70)
ANION GAP SERPL CALCULATED.3IONS-SCNC: 6 MMOL/L (ref 3–14)
AST SERPL W P-5'-P-CCNC: 91 U/L (ref 0–45)
BASOPHILS # BLD AUTO: 0 10E3/UL (ref 0–0.2)
BASOPHILS NFR BLD AUTO: 1 %
BILIRUB DIRECT SERPL-MCNC: 0.5 MG/DL (ref 0–0.2)
BILIRUB SERPL-MCNC: 1.3 MG/DL (ref 0.2–1.3)
BUN SERPL-MCNC: 6 MG/DL (ref 7–30)
CALCIUM SERPL-MCNC: 8 MG/DL (ref 8.5–10.1)
CHLORIDE BLD-SCNC: 102 MMOL/L (ref 94–109)
CO2 SERPL-SCNC: 28 MMOL/L (ref 20–32)
CREAT SERPL-MCNC: 0.5 MG/DL (ref 0.66–1.25)
EOSINOPHIL # BLD AUTO: 0 10E3/UL (ref 0–0.7)
EOSINOPHIL NFR BLD AUTO: 1 %
ERYTHROCYTE [DISTWIDTH] IN BLOOD BY AUTOMATED COUNT: 19.7 % (ref 10–15)
GFR SERPL CREATININE-BSD FRML MDRD: >90 ML/MIN/1.73M2
GLUCOSE BLD-MCNC: 133 MG/DL (ref 70–99)
GLUCOSE BLDC GLUCOMTR-MCNC: 130 MG/DL (ref 70–99)
GLUCOSE BLDC GLUCOMTR-MCNC: 145 MG/DL (ref 70–99)
GLUCOSE BLDC GLUCOMTR-MCNC: 165 MG/DL (ref 70–99)
GLUCOSE BLDC GLUCOMTR-MCNC: 166 MG/DL (ref 70–99)
HCT VFR BLD AUTO: 23.4 % (ref 40–53)
HGB BLD-MCNC: 7.5 G/DL (ref 13.3–17.7)
IMM GRANULOCYTES # BLD: 0 10E3/UL
IMM GRANULOCYTES NFR BLD: 0 %
KETONES BLD-SCNC: 0.3 MMOL/L (ref 0–0.6)
LYMPHOCYTES # BLD AUTO: 0.8 10E3/UL (ref 0.8–5.3)
LYMPHOCYTES NFR BLD AUTO: 24 %
MAGNESIUM SERPL-MCNC: 2.3 MG/DL (ref 1.6–2.3)
MCH RBC QN AUTO: 22.5 PG (ref 26.5–33)
MCHC RBC AUTO-ENTMCNC: 32.1 G/DL (ref 31.5–36.5)
MCV RBC AUTO: 70 FL (ref 78–100)
MONOCYTES # BLD AUTO: 0.3 10E3/UL (ref 0–1.3)
MONOCYTES NFR BLD AUTO: 10 %
NEUTROPHILS # BLD AUTO: 2.1 10E3/UL (ref 1.6–8.3)
NEUTROPHILS NFR BLD AUTO: 64 %
NRBC # BLD AUTO: 0 10E3/UL
NRBC BLD AUTO-RTO: 0 /100
PLATELET # BLD AUTO: 125 10E3/UL (ref 150–450)
POTASSIUM BLD-SCNC: 3.3 MMOL/L (ref 3.4–5.3)
POTASSIUM BLD-SCNC: 3.5 MMOL/L (ref 3.4–5.3)
POTASSIUM BLD-SCNC: 3.7 MMOL/L (ref 3.4–5.3)
PROT SERPL-MCNC: 6.7 G/DL (ref 6.8–8.8)
RBC # BLD AUTO: 3.33 10E6/UL (ref 4.4–5.9)
SODIUM SERPL-SCNC: 136 MMOL/L (ref 133–144)
WBC # BLD AUTO: 3.2 10E3/UL (ref 4–11)

## 2022-12-07 PROCEDURE — 82010 KETONE BODYS QUAN: CPT | Performed by: INTERNAL MEDICINE

## 2022-12-07 PROCEDURE — 85025 COMPLETE CBC W/AUTO DIFF WBC: CPT | Performed by: INTERNAL MEDICINE

## 2022-12-07 PROCEDURE — 120N000002 HC R&B MED SURG/OB UMMC

## 2022-12-07 PROCEDURE — 36415 COLL VENOUS BLD VENIPUNCTURE: CPT | Performed by: INTERNAL MEDICINE

## 2022-12-07 PROCEDURE — 258N000003 HC RX IP 258 OP 636

## 2022-12-07 PROCEDURE — 82248 BILIRUBIN DIRECT: CPT | Performed by: INTERNAL MEDICINE

## 2022-12-07 PROCEDURE — 250N000013 HC RX MED GY IP 250 OP 250 PS 637: Performed by: INTERNAL MEDICINE

## 2022-12-07 PROCEDURE — 250N000012 HC RX MED GY IP 250 OP 636 PS 637: Performed by: INTERNAL MEDICINE

## 2022-12-07 PROCEDURE — 83735 ASSAY OF MAGNESIUM: CPT | Performed by: INTERNAL MEDICINE

## 2022-12-07 PROCEDURE — 84132 ASSAY OF SERUM POTASSIUM: CPT | Performed by: INTERNAL MEDICINE

## 2022-12-07 PROCEDURE — 99232 SBSQ HOSP IP/OBS MODERATE 35: CPT | Performed by: INTERNAL MEDICINE

## 2022-12-07 PROCEDURE — 250N000011 HC RX IP 250 OP 636: Performed by: INTERNAL MEDICINE

## 2022-12-07 PROCEDURE — 97162 PT EVAL MOD COMPLEX 30 MIN: CPT | Mod: GP | Performed by: PHYSICAL THERAPIST

## 2022-12-07 PROCEDURE — 97530 THERAPEUTIC ACTIVITIES: CPT | Mod: GP | Performed by: PHYSICAL THERAPIST

## 2022-12-07 RX ORDER — POTASSIUM CHLORIDE 750 MG/1
40 TABLET, EXTENDED RELEASE ORAL ONCE
Status: COMPLETED | OUTPATIENT
Start: 2022-12-07 | End: 2022-12-07

## 2022-12-07 RX ORDER — MAGNESIUM SULFATE HEPTAHYDRATE 40 MG/ML
4 INJECTION, SOLUTION INTRAVENOUS ONCE
Status: COMPLETED | OUTPATIENT
Start: 2022-12-07 | End: 2022-12-07

## 2022-12-07 RX ORDER — GABAPENTIN 300 MG/1
300 CAPSULE ORAL 3 TIMES DAILY
Status: DISCONTINUED | OUTPATIENT
Start: 2022-12-07 | End: 2022-12-25 | Stop reason: HOSPADM

## 2022-12-07 RX ORDER — SODIUM CHLORIDE 9 MG/ML
INJECTION, SOLUTION INTRAVENOUS
Status: COMPLETED
Start: 2022-12-07 | End: 2022-12-07

## 2022-12-07 RX ORDER — POTASSIUM CHLORIDE 750 MG/1
20 TABLET, EXTENDED RELEASE ORAL ONCE
Status: COMPLETED | OUTPATIENT
Start: 2022-12-07 | End: 2022-12-07

## 2022-12-07 RX ADMIN — CLONIDINE HYDROCHLORIDE 0.1 MG: 0.1 TABLET ORAL at 12:55

## 2022-12-07 RX ADMIN — SODIUM CHLORIDE 500 ML: 9 INJECTION, SOLUTION INTRAVENOUS at 03:19

## 2022-12-07 RX ADMIN — PANTOPRAZOLE SODIUM 40 MG: 40 TABLET, DELAYED RELEASE ORAL at 08:56

## 2022-12-07 RX ADMIN — PANCRELIPASE 3 CAPSULE: 60000; 12000; 38000 CAPSULE, DELAYED RELEASE PELLETS ORAL at 08:57

## 2022-12-07 RX ADMIN — GABAPENTIN 300 MG: 300 CAPSULE ORAL at 20:41

## 2022-12-07 RX ADMIN — POTASSIUM CHLORIDE 40 MEQ: 750 TABLET, EXTENDED RELEASE ORAL at 02:39

## 2022-12-07 RX ADMIN — ACETAMINOPHEN 650 MG: 325 TABLET, FILM COATED ORAL at 10:35

## 2022-12-07 RX ADMIN — GABAPENTIN 300 MG: 300 CAPSULE ORAL at 14:51

## 2022-12-07 RX ADMIN — PANCRELIPASE 3 CAPSULE: 60000; 12000; 38000 CAPSULE, DELAYED RELEASE PELLETS ORAL at 16:26

## 2022-12-07 RX ADMIN — PANCRELIPASE 3 CAPSULE: 60000; 12000; 38000 CAPSULE, DELAYED RELEASE PELLETS ORAL at 12:55

## 2022-12-07 RX ADMIN — PANTOPRAZOLE SODIUM 40 MG: 40 TABLET, DELAYED RELEASE ORAL at 16:25

## 2022-12-07 RX ADMIN — LEVETIRACETAM 2000 MG: 500 TABLET, FILM COATED ORAL at 08:56

## 2022-12-07 RX ADMIN — DIAZEPAM 10 MG: 10 TABLET ORAL at 03:28

## 2022-12-07 RX ADMIN — CLONIDINE HYDROCHLORIDE 0.1 MG: 0.1 TABLET ORAL at 03:26

## 2022-12-07 RX ADMIN — TAMSULOSIN HYDROCHLORIDE 0.4 MG: 0.4 CAPSULE ORAL at 08:58

## 2022-12-07 RX ADMIN — INSULIN ASPART 0.5 UNITS: 100 INJECTION, SOLUTION INTRAVENOUS; SUBCUTANEOUS at 16:27

## 2022-12-07 RX ADMIN — THIAMINE HCL TAB 100 MG 100 MG: 100 TAB at 08:57

## 2022-12-07 RX ADMIN — POTASSIUM CHLORIDE 20 MEQ: 750 TABLET, EXTENDED RELEASE ORAL at 05:31

## 2022-12-07 RX ADMIN — Medication 1 TABLET: at 08:57

## 2022-12-07 RX ADMIN — ACETAMINOPHEN 650 MG: 325 TABLET, FILM COATED ORAL at 16:25

## 2022-12-07 RX ADMIN — MAGNESIUM SULFATE HEPTAHYDRATE 4 G: 40 INJECTION, SOLUTION INTRAVENOUS at 03:16

## 2022-12-07 RX ADMIN — LEVETIRACETAM 2000 MG: 500 TABLET, FILM COATED ORAL at 20:41

## 2022-12-07 RX ADMIN — POTASSIUM CHLORIDE 40 MEQ: 750 TABLET, EXTENDED RELEASE ORAL at 10:32

## 2022-12-07 RX ADMIN — ACETAMINOPHEN 650 MG: 325 TABLET, FILM COATED ORAL at 22:40

## 2022-12-07 RX ADMIN — CLONIDINE HYDROCHLORIDE 0.1 MG: 0.1 TABLET ORAL at 20:41

## 2022-12-07 RX ADMIN — FOLIC ACID 1 MG: 1 TABLET ORAL at 08:57

## 2022-12-07 ASSESSMENT — ACTIVITIES OF DAILY LIVING (ADL)
ADLS_ACUITY_SCORE: 35

## 2022-12-07 NOTE — PROGRESS NOTES
Pt arrived to the unit around 1815.    Pt is alert and oriented x4    O2 sats adequate with 2LPM NC.     Abrasions on the R fingers, bilateral knees.  Bruise on the R hip    Generalized pain-- more so in the back and in the BLE.      Reported nausea on the way over to unit, emesis prior to arriving.

## 2022-12-07 NOTE — PLAN OF CARE
"/55 (BP Location: Left arm, Patient Position: Semi-Damon's, Cuff Size: Adult Regular)   Pulse 95   Temp 99.7  F (37.6  C) (Oral)   Resp 20   Ht 1.778 m (5' 10\")   Wt 75.3 kg (166 lb 0.1 oz)   SpO2 97%   BMI 23.82 kg/m      A&O  X3, denies sob, chest pain, n/v   On 2 L oxygen via nc  Ciwa protocol. Scored 11, 8, PRN valium given    Mag and potassium redrawn. Replacements given.    Redraw scheduled for this morning    Seizure precautions maintained    "

## 2022-12-07 NOTE — PLAN OF CARE
Pt having generalizes pain today due to headache and neuropathy; medicated as able. Pt continent with staff assist holding the urinal. Working with therapy this afternoon. O2 desats a few times this shift; on 2-3L NC and encouraged to sit up when possible. Seems slightly SOB, lung sounds diminished, but clear. Assist with ordering meals and set up. BGs stable and takes medications whole with water. Social work, PT and OT consulted to help facilitate a safe discharge plan.

## 2022-12-07 NOTE — CONSULTS
12/7/2022  I spoke with pt who wasn't sure if he wanted to pursue inpatient DONNA treatment for his alcohol use disorder or not. He requested this writer give him a day to think about it. If there is time, this writer will call him tomorrow.    If pt discharges, he can call [phone] to schedule a [facility] appt.    Oriana Shah MA [facility]  [phone]
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
## 2022-12-07 NOTE — PROGRESS NOTES
I was on the floor when Patient  Arrived on 5 medsurg  form [facility], patient  Apparently was discussed with  Dr. Rivas earlier today     I reviewed his H&P from earlier today so please see for details,  in addition   Looked at patient after arrival    Chronic pancreatitis, continues to have abdominal pain states its chronic, has history chronic pancreatitis on enzymes , history pancreatic duct stent  Later removed      Seizure , Placed on seizure precautions as apparently had seizure  pre admission, per previous discharge  Summary ( was admitted 11/18/22-11/25/22 at [facility] ) notes patient  Believed to have nonepipetic events but he ha snot wanted to believe that so was on keppra 2000 bid , restarted , also checking level     prolonged QTC, agressive K, Mg replacement and monoitor , was 512 , avoid haldol , zyprexa for now     Alcohol use, though he is very vague about it, continue vitamins, CIWA , Chem dep consult     Anemia , form discharge  Form [facility] was on iron  And was to have out patient  Gi work up , Hold subcutaneous lovenox in light of Hg of 9.4  For now but restart  if Hg stable     Pain and weakness in legs and states has not been able to ambulate  For 2 days so placed physical therapy /Occupational therapy  Consult, consider imaging if not better or localizes  Has history of neuropathy     DM, discharge  Summary mentions metformin XR 2000 mg bid, for now check HgA1, accu-checks  insulin sliding scale      Lung mass , was to have biopsy 7/2022 but I don't believe this was done so needs follow up      COPD, nebs available PRN     BPH       Asked pharmacy to de med rec    Repeat BMP tonight   Repeat labs in AM including CBC ketones LFT     Charito Brewster MD

## 2022-12-07 NOTE — UTILIZATION REVIEW
Inpatient appropriate    Admission Status; Secondary Review Determination      Under the authority of the Utilization Management Committee, the utilization review process indicated a secondary review on the above patient. The review outcome is based on review of the medical records, discussions with staff, and applying clinical experience noted on the date of the review.    (x) Inpatient Status Appropriate - This patient's medical care is consistent with medical management for inpatient care and reasonable inpatient medical practice.    RATIONALE FOR DETERMINATION  62-year-old male with history of alcohol use disorder, alcoholic pancreatitis, COPD, seizure disorder was admitted to [facility] on 12/6/2022 due to concerns for seizure activity.  He was found to be in alcohol withdrawal and alcoholic ketoacidosis with significant lites abnormalities including hypokalemia and hypomagnesemia.  Patient is also found to be hypoxic with lowest O2 sats of 83% and requiring supplemental oxygen.  Patient will require ongoing hospitalization for further evaluation of his hypoxia, monitoring for alcohol withdrawal.  Inpatient care is appropriate at this time.    At the time of admission with the information available to the attending physician more than 2 nights Hospital complex care was anticipated, based on patient risk of adverse outcome if treated as outpatient and complex care required. Inpatient admission is appropriate based on the Medicare guidelines.    This document was produced using voice recognition software      The information on this document is developed by the utilization review team in order for the business office to ensure compliance. This only denotes the appropriateness of proper admission status and does not reflect the quality of care rendered.  The definitions of Inpatient Status and Observation Status used in making the determination above are those provided in the CMS Coverage Manual, Chapter 1 and Chapter  6, section 70.4.    Sincerely,    Utilization Review  Physician Advisor  [facility].
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
## 2022-12-08 ENCOUNTER — APPOINTMENT (OUTPATIENT)
Dept: PHYSICAL THERAPY | Facility: CLINIC | Age: 62
End: 2022-12-08
Attending: INTERNAL MEDICINE
Payer: COMMERCIAL

## 2022-12-08 ENCOUNTER — APPOINTMENT (OUTPATIENT)
Dept: OCCUPATIONAL THERAPY | Facility: CLINIC | Age: 62
End: 2022-12-08
Attending: INTERNAL MEDICINE
Payer: COMMERCIAL

## 2022-12-08 LAB
GLUCOSE BLDC GLUCOMTR-MCNC: 131 MG/DL (ref 70–99)
GLUCOSE BLDC GLUCOMTR-MCNC: 162 MG/DL (ref 70–99)
GLUCOSE BLDC GLUCOMTR-MCNC: 173 MG/DL (ref 70–99)
GLUCOSE BLDC GLUCOMTR-MCNC: 192 MG/DL (ref 70–99)
GLUCOSE BLDC GLUCOMTR-MCNC: 217 MG/DL (ref 70–99)
HOLD SPECIMEN: NORMAL
MAGNESIUM SERPL-MCNC: 1.5 MG/DL (ref 1.6–2.3)
MAGNESIUM SERPL-MCNC: 1.9 MG/DL (ref 1.6–2.3)
PHOSPHATE SERPL-MCNC: 0.9 MG/DL (ref 2.5–4.5)
POTASSIUM BLD-SCNC: 3.6 MMOL/L (ref 3.4–5.3)

## 2022-12-08 PROCEDURE — 84132 ASSAY OF SERUM POTASSIUM: CPT | Performed by: INTERNAL MEDICINE

## 2022-12-08 PROCEDURE — 250N000013 HC RX MED GY IP 250 OP 250 PS 637: Performed by: INTERNAL MEDICINE

## 2022-12-08 PROCEDURE — 97530 THERAPEUTIC ACTIVITIES: CPT | Mod: GP | Performed by: REHABILITATION PRACTITIONER

## 2022-12-08 PROCEDURE — 120N000002 HC R&B MED SURG/OB UMMC

## 2022-12-08 PROCEDURE — 99232 SBSQ HOSP IP/OBS MODERATE 35: CPT | Performed by: INTERNAL MEDICINE

## 2022-12-08 PROCEDURE — 250N000011 HC RX IP 250 OP 636: Performed by: INTERNAL MEDICINE

## 2022-12-08 PROCEDURE — 36415 COLL VENOUS BLD VENIPUNCTURE: CPT | Performed by: INTERNAL MEDICINE

## 2022-12-08 PROCEDURE — G0463 HOSPITAL OUTPT CLINIC VISIT: HCPCS

## 2022-12-08 PROCEDURE — 97166 OT EVAL MOD COMPLEX 45 MIN: CPT | Mod: GO

## 2022-12-08 PROCEDURE — 83735 ASSAY OF MAGNESIUM: CPT | Performed by: INTERNAL MEDICINE

## 2022-12-08 PROCEDURE — 250N000009 HC RX 250: Performed by: INTERNAL MEDICINE

## 2022-12-08 PROCEDURE — 84100 ASSAY OF PHOSPHORUS: CPT | Performed by: INTERNAL MEDICINE

## 2022-12-08 PROCEDURE — 258N000003 HC RX IP 258 OP 636: Performed by: INTERNAL MEDICINE

## 2022-12-08 PROCEDURE — 97535 SELF CARE MNGMENT TRAINING: CPT | Mod: GO

## 2022-12-08 RX ORDER — MAGNESIUM SULFATE HEPTAHYDRATE 40 MG/ML
4 INJECTION, SOLUTION INTRAVENOUS ONCE
Status: COMPLETED | OUTPATIENT
Start: 2022-12-08 | End: 2022-12-08

## 2022-12-08 RX ORDER — POTASSIUM CHLORIDE 750 MG/1
20 TABLET, EXTENDED RELEASE ORAL ONCE
Status: COMPLETED | OUTPATIENT
Start: 2022-12-08 | End: 2022-12-08

## 2022-12-08 RX ADMIN — CLONIDINE HYDROCHLORIDE 0.1 MG: 0.1 TABLET ORAL at 05:05

## 2022-12-08 RX ADMIN — LEVETIRACETAM 2000 MG: 500 TABLET, FILM COATED ORAL at 20:40

## 2022-12-08 RX ADMIN — CLONIDINE HYDROCHLORIDE 0.1 MG: 0.1 TABLET ORAL at 20:40

## 2022-12-08 RX ADMIN — LEVETIRACETAM 2000 MG: 500 TABLET, FILM COATED ORAL at 09:40

## 2022-12-08 RX ADMIN — MAGNESIUM SULFATE HEPTAHYDRATE 4 G: 40 INJECTION, SOLUTION INTRAVENOUS at 16:22

## 2022-12-08 RX ADMIN — GABAPENTIN 300 MG: 300 CAPSULE ORAL at 20:41

## 2022-12-08 RX ADMIN — PANCRELIPASE 3 CAPSULE: 60000; 12000; 38000 CAPSULE, DELAYED RELEASE PELLETS ORAL at 09:42

## 2022-12-08 RX ADMIN — INSULIN ASPART 0.5 UNITS: 100 INJECTION, SOLUTION INTRAVENOUS; SUBCUTANEOUS at 12:18

## 2022-12-08 RX ADMIN — ACETAMINOPHEN 650 MG: 325 TABLET, FILM COATED ORAL at 20:41

## 2022-12-08 RX ADMIN — GABAPENTIN 300 MG: 300 CAPSULE ORAL at 09:40

## 2022-12-08 RX ADMIN — SODIUM PHOSPHATE, MONOBASIC, MONOHYDRATE AND SODIUM PHOSPHATE, DIBASIC, ANHYDROUS 15 MMOL: 276; 142 INJECTION, SOLUTION INTRAVENOUS at 18:55

## 2022-12-08 RX ADMIN — CLONIDINE HYDROCHLORIDE 0.1 MG: 0.1 TABLET ORAL at 12:17

## 2022-12-08 RX ADMIN — FOLIC ACID 1 MG: 1 TABLET ORAL at 09:40

## 2022-12-08 RX ADMIN — SODIUM PHOSPHATE, MONOBASIC, MONOHYDRATE AND SODIUM PHOSPHATE, DIBASIC, ANHYDROUS 15 MMOL: 276; 142 INJECTION, SOLUTION INTRAVENOUS at 21:26

## 2022-12-08 RX ADMIN — PANTOPRAZOLE SODIUM 40 MG: 40 TABLET, DELAYED RELEASE ORAL at 16:22

## 2022-12-08 RX ADMIN — POTASSIUM CHLORIDE 20 MEQ: 750 TABLET, EXTENDED RELEASE ORAL at 18:01

## 2022-12-08 RX ADMIN — PANTOPRAZOLE SODIUM 40 MG: 40 TABLET, DELAYED RELEASE ORAL at 09:40

## 2022-12-08 RX ADMIN — TAMSULOSIN HYDROCHLORIDE 0.4 MG: 0.4 CAPSULE ORAL at 09:40

## 2022-12-08 RX ADMIN — ACETAMINOPHEN 650 MG: 325 TABLET, FILM COATED ORAL at 05:05

## 2022-12-08 RX ADMIN — GABAPENTIN 300 MG: 300 CAPSULE ORAL at 14:13

## 2022-12-08 RX ADMIN — PANCRELIPASE 3 CAPSULE: 60000; 12000; 38000 CAPSULE, DELAYED RELEASE PELLETS ORAL at 12:17

## 2022-12-08 RX ADMIN — Medication 1 TABLET: at 09:40

## 2022-12-08 RX ADMIN — INSULIN ASPART 1 UNITS: 100 INJECTION, SOLUTION INTRAVENOUS; SUBCUTANEOUS at 18:05

## 2022-12-08 RX ADMIN — PANCRELIPASE 3 CAPSULE: 60000; 12000; 38000 CAPSULE, DELAYED RELEASE PELLETS ORAL at 18:01

## 2022-12-08 RX ADMIN — THIAMINE HCL TAB 100 MG 100 MG: 100 TAB at 09:40

## 2022-12-08 ASSESSMENT — ACTIVITIES OF DAILY LIVING (ADL)
ADLS_ACUITY_SCORE: 31
ADLS_ACUITY_SCORE: 35
ADLS_ACUITY_SCORE: 31
ADLS_ACUITY_SCORE: 31
ADLS_ACUITY_SCORE: 35
ADLS_ACUITY_SCORE: 31
ADLS_ACUITY_SCORE: 35

## 2022-12-08 NOTE — PROGRESS NOTES
[facility]    Medicine Progress Note - Hospitalist Service, GOLD TEAM 18    Date of Admission:  12/6/2022    Assessment & Plan     A: Patient is a 61 y/o man who has multiple medical problems including alcohol dependence, alcoholic pancreatitis, chronic pancreatitis, past pancreatic stents, seizure disorder, COPD, hypokalemia and microcytic anemia. Patient presented with concerns about seizure activity. Patient was found to have alcohol withdrawal and alcoholic ketoacidosis.      P:  1.) Alcohol dependence with alcohol withdrawal: Monitoring on CIWA protocol.  2.) Alcoholic ketoacidosis, appears resolved: No active intervention indicated at present.  3.) Hypoxemia: Monitoring for additional symptoms. Oxygen support as needed.  4.) Seizure disorder: Patient on seizure precautions for now. Patient back on keppra.  5.) Generalized weakness: PT/OT.  6.) Peripheral neuropathy: Patient to resume gabapentin.  7.) Chronic pancreatitis: Patient on creon. Monitoring ability to tolerate diet.  8.) Microcytic anemia; iron deficiency anemia: Anticipate that patient will eventually be on supplemental iron as outpatient. Patient would benefit from colonoscopy as outpatient.  9.) Peripheral edema: Monitoring for additional symptoms.  10.) Covid-19 exposure; PCR was negative: Monitoring for symptoms. If patient has symptoms suggestive of Covid-19 infection, PCR will be repeated.  11.) Left lower lobe nodule: CT chest in 3 months as outpatient.  12.) COPD: Patient compensated. Monitoring for changes.  13.) Blindness - patient has history of left eye enucleation and patient has poor vision in right eye: Monitoring for safety.  14.) Diabetes mellitus type II; patient listed as being on metformin as outpatient: Metformin on hold for now. Patient on insulin sliding scale.  15.) Hypokalemia, hypomagnesemia: Supplementing as needed.         Wilbur Hercules MD  Hospitalist Service, GOLD TEAM 18  M  [facility]  Securely message with the Vocera Web Console (learn more here)  Text page via [facility] Paging/Directory   Please see signed in provider for up to date coverage information      Clinically Significant Risk Factors        # Hypokalemia: Lowest K = 2.8 mmol/L in last 2 days, will replace as needed     # Hypomagnesemia: Lowest Mg = 1.3 mg/dL in last 2 days, will replace as needed  # Anion Gap Metabolic Acidosis: Highest Anion Gap = 19 mmol/L in last 2 days, will monitor and treat as appropriate  # Hypoalbuminemia: Lowest albumin = 2.6 g/dL at 12/7/2022  8:32 AM, will monitor as appropriate   # Thrombocytopenia: Lowest platelets = 125 in last 2 days, will monitor for bleeding                 ______________________________________________________________________    Interval History     Patient noted continued abdominal discomfort. Patient reported eating half of his breakfast. Patient noted nausea but no vomiting. Patient noted no further seizures. Patient noted some cough following activity. Patient noted intermittent wheezing but did not think that this was out of the ordinary from him.    Physical Exam   Vital Signs: Temp: 97.8  F (36.6  C) Temp src: Oral BP: 102/60 Pulse: 67   Resp: 20 SpO2: 98 % O2 Device: Nasal cannula Oxygen Delivery: 1 LPM  Weight: 166 lbs .1 oz    General: Patient comfortable, NAD.  Heart: RRR, S1 S2 w/o murmurs.  Lungs: Breath sounds present. No crackles/wheezes heard.  Abdomen: Soft, nontender.    Data

## 2022-12-08 NOTE — PLAN OF CARE
Pt aox4. A1 bismark steady (not oob this shift). Mostly blind. Regular diet, thin liquids, takes pills whole. TIID, last     Pain: tylenol q6h for headache and abd pain.

## 2022-12-08 NOTE — CARE PLAN
"/60 (BP Location: Right arm)   Pulse 67   Temp 97.8  F (36.6  C) (Oral)   Resp 20   Ht 1.778 m (5' 10\")   Wt 75.3 kg (166 lb 0.1 oz)   SpO2 98%   BMI 23.82 kg/m      Patient is alert and oriented x4.   On 1 Liter of oxygen via NC.  A1 with bismark study.  Mostly blind.   No complaints of headache on this shift.   BG checks and sliding scale.  Call light left within reach.   "

## 2022-12-08 NOTE — PROGRESS NOTES
Care Management Follow Up    Length of Stay (days): 2    Expected Discharge Date: 12/08/2022     Concerns to be Addressed: Discharge Planning         Patient plan of care discussed at interdisciplinary rounds: Yes    Anticipated Discharge Disposition:  TCU     Anticipated Discharge Services:  TCU    Anticipated Discharge DME:  TBD    Patient/family educated on Medicare website which has current facility and service quality ratings:  Yes     Education Provided on the Discharge Plan:  Yes    Patient/Family in Agreement with the Plan:  Yes    Referrals Placed by CM/SW:  TCU    Private pay costs discussed: Not applicable    Additional Information:  Writer met with patient to discuss discharge planning. Writer informed patient of recommendation for TCU. Patient reports that he has been in a TCU before, but couldn't remember which one. He states that he does not have a preference of facility, but wants to stay in the [facility] area. Writer sent referrals to the below facilities with patient's permission.    Pending Referrals    [facility]  [address] [place] [address]  [phone]    [facility]  [address] [place] [address]  [phone]    [facility]  [address] [place] [address]  [phone]    [facility]  [address] [place] [address]  [phone]    [facility]  [address] [place], [place] [id number] [phone]    [facility]  [address] [place], [place] [address] [phone]    [facility]  [address] [place], [place] [id number] [phone]    [facility]  [address] [place] [address]  [phone]    [facility]  [address] [place], [place] [id number] [phone]    [facility]  [address] [place] [address]  [phone]    [facility]  [address] [place], [place] [id number] [phone]    [facility]  [address] [address]  [phone]    JOYCELYN Nevarez, MSW  Adult Acute Care Float   Pager [phone]

## 2022-12-08 NOTE — CONSULTS
[facility]  WOC Nurse Inpatient Assessment     Consulted for: left ear    Patient History (according to provider note(s):      Patient is a 61 y/o man who has multiple medical problems including alcohol dependence, alcoholic pancreatitis, chronic pancreatitis, past pancreatic stents, seizure disorder, COPD, hypokalemia and microcytic anemia. Patient presented with concerns about seizure activity. Patient was found to have alcohol withdrawal and alcoholic ketoacidosis.     Areas Assessed:      Areas visualized during today's visit: Face and neck    Pressure Injury Location: left ear      Last photo: 12/8/22  Wound type: Pressure Injury     Pressure Injury Stage: 2, hospital acquired      This is a Medical Device Related Pressure Injury (MDRPI) due to oxygen tubing  Wound history/plan of care:   Noted on skin assessment during skin study. Pt denied pain    Wound base: 100 % dermis     Palpation of the wound bed: normal      Drainage: scant     Description of drainage: serous     Measurements (length x width x depth, in cm) 0.4 x 0.2 x 0.1 cm  Periwound skin: Intact      Color: normal and consistent with surrounding tissue      Temperature: normal   Odor: none  Pain: denies , none  Pain intervention prior to dressing change: no significant pain present   Treatment goal: Protection  STATUS: initial assessment  Supplies ordered: discussed with RN and discussed with patient       Treatment Plan:     Left ear wound(s): Daily    Use foam protector on oxygen tubing when in use.      Orders: Written    RECOMMEND PRIMARY TEAM ORDER: None, at this time  Education provided: importance of repositioning, plan of care and wound progress  Discussed plan of care with: Patient and Nurse  WOC nurse follow-up plan: weekly  Notify WOC if wound(s) deteriorate.  Nursing to notify the Provider(s) and re-consult the WOC Nurse if new skin concern.    DATA:     Current support surface: Standard   Standard gel/foam mattress (IsoFlex, Atmos air, etc)  Containment of urine/stool: Continent of bladder and Continent of bowel  BMI: Body mass index is 23.82 kg/m .   Active diet order: Orders Placed This Encounter      Combination Diet Regular Diet Adult     Output: I/O last 3 completed shifts:  In: -   Out: 1250 [Urine:1250]     Labs: Recent Labs   Lab 12/07/22  0832   ALBUMIN 2.6*   HGB 7.5*   WBC 3.2*     Pressure injury risk assessment:   Sensory Perception: 3-->slightly limited  Moisture: 4-->rarely moist  Activity: 3-->walks occasionally  Mobility: 3-->slightly limited  Nutrition: 3-->adequate  Friction and Shear: 3-->no apparent problem  Kwesi Score: 19    Vidhya Mercado RN CWOCN  Dept. Pager: [phone]  Dept. Office Number: [phone]

## 2022-12-08 NOTE — PROGRESS NOTES
RN from [id number]    Pt remained in room in bed. Needed assistance calling for dinner and setting up. Good appetite. PO tylenol given for headache with slight relief. Call light in reach. Pt calling appropriately.

## 2022-12-08 NOTE — PROGRESS NOTES
"   12/08/22 1500   Appointment Info   Signing Clinician's Name / Credentials (OT) Dasia Richmond, OTR/L   Rehab Comments (OT) blind   Living Environment   People in Home other (see comments)  (spouse/shelter)   Current Living Arrangements shelter   Home Accessibility stairs within home   Number of Stairs, Within Home, Primary greater than 10 stairs   Stair Railings, Within Home, Primary railings safe and in good condition;railings on both sides of stairs   Transportation Anticipated health plan transportation   Living Environment Comments pt reports 3 flights of stairs within the shelter. difficult to gather full living env. pt providing multiple information including living in hotel with wife and sometimes living in shelter.   Self-Care   Usual Activity Tolerance fair   Current Activity Tolerance poor   Regular Exercise No   Equipment Currently Used at Home   (blind cane/stick)   Fall history within last six months yes   Number of times patient has fallen within last six months 2   Activity/Exercise/Self-Care Comment pt reports being independent with ADLs. pt reports using blind cane but doesn't currently have one because it has been broken. pt reports using lightrail for transportation.   Instrumental Activities of Daily Living (IADL)   IADL Comments pt reports receiving assist from wife on IADLs but doesn't complete IADLs regularly. they prefer to eat out for meals.   General Information   Onset of Illness/Injury or Date of Surgery 12/06/22   Referring Physician Wilbur Hercules MD   Patient/Family Therapy Goal Statement (OT) get stronger   Additional Occupational Profile Info/Pertinent History of Current Problem per chart: \" Patient is a 63 y/o man who has multiple medical problems including alcohol dependence, alcoholic pancreatitis, chronic pancreatitis, past pancreatic stents, seizure disorder, COPD, hypokalemia and microcytic anemia. Patient presented with concerns about seizure activity. Patient was found to " "have alcohol withdrawal and alcoholic ketoacidosis\"   Existing Precautions/Restrictions fall;seizures   Limitations/Impairments safety/cognitive  (pain)   Cognitive Status Examination   Orientation Status orientation to person, place and time   Attention Deficit focused/sustained attention;arousal/alertness   Executive Function Deficit information processing;initiation   Visual Perception   Visual Impairment/Limitations legally blind   Impact of Vision Impairment on Function (Vision) pt reports he can ssee outlines and shadows but difficulty making them out. pt reports he uses blind stick regularly but his is broken and doesn't currently have one.   Sensory   Sensory Quick Adds sensation intact   Pain Assessment   Patient Currently in Pain Yes, see Vital Sign flowsheet   Range of Motion Comprehensive   Comment, General Range of Motion BUE WFL   Strength Comprehensive (MMT)   Comment, General Manual Muscle Testing (MMT) Assessment BUE limited strength, grossly 2+/5 - 3/5   Muscle Tone Assessment   Muscle Tone Quick Adds No deficits were identified   Coordination   Fine Motor Coordination difficulty opening containers, caps, and jars.   Bed Mobility   Comment (Bed Mobility) Daniela with further education required   Transfers   Transfer Comments modA with FWW, GB and max safety cues   Clinical Impression   Criteria for Skilled Therapeutic Interventions Met (OT) Yes, treatment indicated   OT Diagnosis self-care impairment   OT Problem List-Impairments impacting ADL problems related to;activity tolerance impaired;balance;cognition;range of motion (ROM);strength;pain;vision   Assessment of Occupational Performance 3-5 Performance Deficits   Identified Performance Deficits dressing, bathing, toileting, g/h, home management   Planned Therapy Interventions (OT) IADL retraining;ADL retraining;balance training;bed mobility training;ROM;strengthening;transfer training;visual perception;home program guidelines;progressive " activity/exercise;risk factor education   Clinical Decision Making Complexity (OT) moderate complexity   Anticipated Equipment Needs Upon Discharge (OT)   (TBD)   Risk & Benefits of therapy have been explained evaluation/treatment results reviewed;care plan/treatment goals reviewed;risks/benefits reviewed;current/potential barriers reviewed;participants voiced agreement with care plan;participants included;patient   OT Total Evaluation Time   OT Eval, Moderate Complexity Minutes (19108) 5   OT Goals   Therapy Frequency (OT) Daily   OT Predicted Duration/Target Date for Goal Attainment 12/22/22   OT Goals Hygiene/Grooming;Lower Body Dressing;Toilet Transfer/Toileting;Cognition   OT: Hygiene/Grooming supervision/stand-by assist;using adaptive equipment;while standing   OT: Lower Body Dressing Supervision/stand-by assist;using adaptive equipment   OT: Toilet Transfer/Toileting Supervision/stand-by assist;toilet transfer;cleaning and garment management;using adaptive equipment   OT: Cognitive Patient/caregiver will verbalize understanding of cognitive assessment results/recommendations as needed for safe discharge planning   OT Discharge Planning   OT Plan OT: BUE strengthening, ADLs EOB, LBD, chair transfers.   OT Discharge Recommendation (DC Rec) Transitional Care Facility;home with assist   OT Rationale for DC Rec primary rec of dc to TCU. pt not safe to dc home, significant weakness impacting performance in daily self-care activities. pt maxA for STS with knee blocking. pt would require max Ax2 and 24/7 assist if dc home requiring AE including blind stick, commode, shower chair.   OT Brief overview of current status maxA x1-2 with STS with knee blocking. Daniela LBD. set-up g.h while seated EOB   Total Session Time   Total Session Time (sum of timed and untimed services) 5

## 2022-12-08 NOTE — CONSULTS
12/8/2022    Spoke with pt via telephone to offer CD services. Pt reports he wouldn't be interested in attending CD treatment. Pt reports he has attended treatment a long time ago. Pt is able to call Mental Health and Addiction Services Line: [phone] and make an appt through [employer] if he would like an evaluation and referrals to CD treatment after he is discharged.     Alcoholics Anonymous  http://www.aa.org  Community Drug & Alcohol Support Resources   Alcoholics Anonymous   24/7 Phone Line: [phone]   https://[place].org/   https://[place].org/   For additional list of online meetings: http://aa-intergroup.org        [place] [facility]   [address] [place] [address] [address]   [place] [address]   Phone: [phone]  [email or web address]    PEDRO Horton  Phone: [phone]  Email; [email or web address]

## 2022-12-09 ENCOUNTER — APPOINTMENT (OUTPATIENT)
Dept: PHYSICAL THERAPY | Facility: CLINIC | Age: 62
End: 2022-12-09
Payer: COMMERCIAL

## 2022-12-09 LAB
ALBUMIN SERPL-MCNC: 2.5 G/DL (ref 3.4–5)
ALP SERPL-CCNC: 84 U/L (ref 40–150)
ALT SERPL W P-5'-P-CCNC: 34 U/L (ref 0–70)
ANION GAP SERPL CALCULATED.3IONS-SCNC: 5 MMOL/L (ref 3–14)
AST SERPL W P-5'-P-CCNC: 57 U/L (ref 0–45)
BILIRUB SERPL-MCNC: 0.7 MG/DL (ref 0.2–1.3)
BUN SERPL-MCNC: 7 MG/DL (ref 7–30)
CALCIUM SERPL-MCNC: 8.6 MG/DL (ref 8.5–10.1)
CHLORIDE BLD-SCNC: 103 MMOL/L (ref 94–109)
CO2 SERPL-SCNC: 30 MMOL/L (ref 20–32)
CREAT SERPL-MCNC: 0.52 MG/DL (ref 0.66–1.25)
CREAT SERPL-MCNC: 0.56 MG/DL (ref 0.67–1.17)
ERYTHROCYTE [DISTWIDTH] IN BLOOD BY AUTOMATED COUNT: 20.3 % (ref 10–15)
GFR SERPL CREATININE-BSD FRML MDRD: >90 ML/MIN/1.73M2
GFR SERPL CREATININE-BSD FRML MDRD: >90 ML/MIN/1.73M2
GLUCOSE BLD-MCNC: 178 MG/DL (ref 70–99)
GLUCOSE BLDC GLUCOMTR-MCNC: 173 MG/DL (ref 70–99)
GLUCOSE BLDC GLUCOMTR-MCNC: 188 MG/DL (ref 70–99)
GLUCOSE BLDC GLUCOMTR-MCNC: 193 MG/DL (ref 70–99)
GLUCOSE BLDC GLUCOMTR-MCNC: 211 MG/DL (ref 70–99)
HBA1C MFR BLD: 6.7 %
HCT VFR BLD AUTO: 24 % (ref 40–53)
HGB BLD-MCNC: 7.6 G/DL (ref 13.3–17.7)
MAGNESIUM SERPL-MCNC: 1.7 MG/DL (ref 1.6–2.3)
MCH RBC QN AUTO: 22.8 PG (ref 26.5–33)
MCHC RBC AUTO-ENTMCNC: 31.7 G/DL (ref 31.5–36.5)
MCV RBC AUTO: 72 FL (ref 78–100)
PHOSPHATE SERPL-MCNC: 1.8 MG/DL (ref 2.5–4.5)
PHOSPHATE SERPL-MCNC: 2 MG/DL (ref 2.5–4.5)
PHOSPHATE SERPL-MCNC: 2.1 MG/DL (ref 2.5–4.5)
PLATELET # BLD AUTO: 94 10E3/UL (ref 150–450)
POTASSIUM BLD-SCNC: 3.7 MMOL/L (ref 3.4–5.3)
PROT SERPL-MCNC: 6.7 G/DL (ref 6.8–8.8)
RBC # BLD AUTO: 3.33 10E6/UL (ref 4.4–5.9)
SODIUM SERPL-SCNC: 138 MMOL/L (ref 133–144)
WBC # BLD AUTO: 4.5 10E3/UL (ref 4–11)

## 2022-12-09 PROCEDURE — 250N000009 HC RX 250: Performed by: INTERNAL MEDICINE

## 2022-12-09 PROCEDURE — 258N000003 HC RX IP 258 OP 636: Performed by: INTERNAL MEDICINE

## 2022-12-09 PROCEDURE — 80053 COMPREHEN METABOLIC PANEL: CPT | Performed by: INTERNAL MEDICINE

## 2022-12-09 PROCEDURE — 84100 ASSAY OF PHOSPHORUS: CPT | Performed by: INTERNAL MEDICINE

## 2022-12-09 PROCEDURE — 83735 ASSAY OF MAGNESIUM: CPT | Performed by: INTERNAL MEDICINE

## 2022-12-09 PROCEDURE — 97530 THERAPEUTIC ACTIVITIES: CPT | Mod: GP | Performed by: REHABILITATION PRACTITIONER

## 2022-12-09 PROCEDURE — 36415 COLL VENOUS BLD VENIPUNCTURE: CPT | Performed by: INTERNAL MEDICINE

## 2022-12-09 PROCEDURE — 85027 COMPLETE CBC AUTOMATED: CPT | Performed by: INTERNAL MEDICINE

## 2022-12-09 PROCEDURE — 250N000013 HC RX MED GY IP 250 OP 250 PS 637: Performed by: INTERNAL MEDICINE

## 2022-12-09 PROCEDURE — 99232 SBSQ HOSP IP/OBS MODERATE 35: CPT | Performed by: INTERNAL MEDICINE

## 2022-12-09 PROCEDURE — 120N000002 HC R&B MED SURG/OB UMMC

## 2022-12-09 RX ORDER — MAGNESIUM OXIDE 400 MG/1
400 TABLET ORAL EVERY 4 HOURS
Status: COMPLETED | OUTPATIENT
Start: 2022-12-09 | End: 2022-12-09

## 2022-12-09 RX ORDER — DOCUSATE SODIUM 100 MG/1
100 CAPSULE, LIQUID FILLED ORAL 2 TIMES DAILY PRN
Status: DISCONTINUED | OUTPATIENT
Start: 2022-12-09 | End: 2022-12-16

## 2022-12-09 RX ORDER — POTASSIUM CHLORIDE 750 MG/1
20 TABLET, EXTENDED RELEASE ORAL ONCE
Status: COMPLETED | OUTPATIENT
Start: 2022-12-09 | End: 2022-12-09

## 2022-12-09 RX ORDER — BISACODYL 10 MG
10 SUPPOSITORY, RECTAL RECTAL DAILY PRN
Status: DISCONTINUED | OUTPATIENT
Start: 2022-12-09 | End: 2022-12-25 | Stop reason: HOSPADM

## 2022-12-09 RX ORDER — POLYETHYLENE GLYCOL 3350 17 G/17G
17 POWDER, FOR SOLUTION ORAL DAILY
Status: DISCONTINUED | OUTPATIENT
Start: 2022-12-09 | End: 2022-12-20

## 2022-12-09 RX ADMIN — GABAPENTIN 300 MG: 300 CAPSULE ORAL at 20:41

## 2022-12-09 RX ADMIN — PANTOPRAZOLE SODIUM 40 MG: 40 TABLET, DELAYED RELEASE ORAL at 16:49

## 2022-12-09 RX ADMIN — POTASSIUM & SODIUM PHOSPHATES POWDER PACK 280-160-250 MG 1 PACKET: 280-160-250 PACK at 17:59

## 2022-12-09 RX ADMIN — TAMSULOSIN HYDROCHLORIDE 0.4 MG: 0.4 CAPSULE ORAL at 09:34

## 2022-12-09 RX ADMIN — POTASSIUM & SODIUM PHOSPHATES POWDER PACK 280-160-250 MG 1 PACKET: 280-160-250 PACK at 22:57

## 2022-12-09 RX ADMIN — POLYETHYLENE GLYCOL 3350 17 G: 17 POWDER, FOR SOLUTION ORAL at 15:05

## 2022-12-09 RX ADMIN — Medication 1 TABLET: at 09:34

## 2022-12-09 RX ADMIN — FOLIC ACID 1 MG: 1 TABLET ORAL at 09:34

## 2022-12-09 RX ADMIN — ACETAMINOPHEN 650 MG: 325 TABLET, FILM COATED ORAL at 09:41

## 2022-12-09 RX ADMIN — INSULIN ASPART 0.5 UNITS: 100 INJECTION, SOLUTION INTRAVENOUS; SUBCUTANEOUS at 12:31

## 2022-12-09 RX ADMIN — PANCRELIPASE 3 CAPSULE: 60000; 12000; 38000 CAPSULE, DELAYED RELEASE PELLETS ORAL at 12:31

## 2022-12-09 RX ADMIN — POTASSIUM PHOSPHATE, MONOBASIC AND POTASSIUM PHOSPHATE, DIBASIC 15 MMOL: 224; 236 INJECTION, SOLUTION INTRAVENOUS at 05:04

## 2022-12-09 RX ADMIN — THIAMINE HCL TAB 100 MG 100 MG: 100 TAB at 09:34

## 2022-12-09 RX ADMIN — GABAPENTIN 300 MG: 300 CAPSULE ORAL at 15:05

## 2022-12-09 RX ADMIN — CLONIDINE HYDROCHLORIDE 0.1 MG: 0.1 TABLET ORAL at 05:04

## 2022-12-09 RX ADMIN — Medication 400 MG: at 14:57

## 2022-12-09 RX ADMIN — PANTOPRAZOLE SODIUM 40 MG: 40 TABLET, DELAYED RELEASE ORAL at 09:34

## 2022-12-09 RX ADMIN — POTASSIUM & SODIUM PHOSPHATES POWDER PACK 280-160-250 MG 1 PACKET: 280-160-250 PACK at 15:05

## 2022-12-09 RX ADMIN — POTASSIUM CHLORIDE 20 MEQ: 750 TABLET, EXTENDED RELEASE ORAL at 14:57

## 2022-12-09 RX ADMIN — CLONIDINE HYDROCHLORIDE 0.1 MG: 0.1 TABLET ORAL at 20:42

## 2022-12-09 RX ADMIN — CLONIDINE HYDROCHLORIDE 0.1 MG: 0.1 TABLET ORAL at 12:30

## 2022-12-09 RX ADMIN — ACETAMINOPHEN 650 MG: 325 TABLET, FILM COATED ORAL at 20:42

## 2022-12-09 RX ADMIN — LEVETIRACETAM 2000 MG: 500 TABLET, FILM COATED ORAL at 09:34

## 2022-12-09 RX ADMIN — GABAPENTIN 300 MG: 300 CAPSULE ORAL at 09:34

## 2022-12-09 RX ADMIN — Medication 400 MG: at 18:02

## 2022-12-09 RX ADMIN — PANCRELIPASE 3 CAPSULE: 60000; 12000; 38000 CAPSULE, DELAYED RELEASE PELLETS ORAL at 09:38

## 2022-12-09 RX ADMIN — INSULIN ASPART 0.5 UNITS: 100 INJECTION, SOLUTION INTRAVENOUS; SUBCUTANEOUS at 09:31

## 2022-12-09 RX ADMIN — PANCRELIPASE 3 CAPSULE: 60000; 12000; 38000 CAPSULE, DELAYED RELEASE PELLETS ORAL at 18:01

## 2022-12-09 RX ADMIN — LEVETIRACETAM 2000 MG: 500 TABLET, FILM COATED ORAL at 22:58

## 2022-12-09 RX ADMIN — INSULIN ASPART 0.5 UNITS: 100 INJECTION, SOLUTION INTRAVENOUS; SUBCUTANEOUS at 16:45

## 2022-12-09 ASSESSMENT — ACTIVITIES OF DAILY LIVING (ADL)
ADLS_ACUITY_SCORE: 31

## 2022-12-09 NOTE — PROGRESS NOTES
[facility]    Medicine Progress Note - Hospitalist Service, GOLD TEAM 18    Date of Admission:  12/6/2022    Assessment & Plan     A: Patient is a 61 y/o man who has multiple medical problems including alcohol dependence, alcoholic pancreatitis, chronic pancreatitis, past pancreatic stents, seizure disorder, COPD, hypokalemia and microcytic anemia. Patient presented with concerns about seizure activity. Patient was found to have alcohol withdrawal and alcoholic ketoacidosis, both of which appear resolved.     P:  1.) Alcohol dependence with alcohol withdrawal: Monitoring on CIWA protocol.  2.) Alcoholic ketoacidosis, appears resolved: No active intervention indicated at present.  3.) Hypoxemia of unclear cause, improving: Monitoring for additional symptoms. Oxygen support as needed.  4.) Seizure disorder: Patient on seizure precautions for now. Patient back on keppra.  5.) Generalized weakness: PT/OT.  6.) Peripheral neuropathy: Patient to resume gabapentin.  7.) Chronic pancreatitis: Patient on creon. Monitoring ability to tolerate diet. Antiemetics as needed for nausea.  8.) Microcytic anemia; iron deficiency anemia: Anticipate that patient will eventually be on supplemental iron as outpatient. Patient would benefit from colonoscopy as outpatient.  9.) Peripheral edema: Monitoring for additional symptoms.  10.) Covid-19 exposure; PCR was negative: Monitoring for symptoms. If patient has symptoms suggestive of Covid-19 infection, PCR will be repeated.  11.) Left lower lobe nodule: CT chest in 3 months as outpatient.  12.) COPD: Patient compensated. Monitoring for changes.  13.) Blindness - patient has history of left eye enucleation and patient has poor vision in right eye: Monitoring for safety.  14.) Diabetes mellitus type II; patient listed as being on metformin as outpatient: Metformin on hold for now. Patient on insulin sliding scale.  15.) Hypokalemia,  hypomagnesemia, hypophosphatemia: Supplementing as needed.  16.) Dysuria: Will check urinalysis and post-void residuals.  17.) Constipation: Bowel regimen.       Diet: Combination Diet Regular Diet Adult    DVT Prophylaxis: Pneumatic compression device ordered  Trejo Catheter: Not present  Central Lines: None  Cardiac Monitoring: None  Code Status: Full Code        Wilbur Hercules MD  Hospitalist Service, GOLD TEAM 18  [facility]  Securely message with the Vocera Web Console (learn more here)  Text page via [facility] Paging/Directory   Please see signed in provider for up to date coverage information      Clinically Significant Risk Factors            # Hypomagnesemia: Lowest Mg = 1.5 mg/dL in last 2 days, will replace as needed   # Hypoalbuminemia: Lowest albumin = 2.5 g/dL at 12/9/2022  7:10 AM, will monitor as appropriate   # Thrombocytopenia: Lowest platelets = 94 in last 2 days, will monitor for bleeding                 ______________________________________________________________________    Interval History     Patient noted having nausea overnight. Patient noted also having diffuse abdominal discomfort today. Patient noted being unable to pass urine yesterday but reported being able to pass urine today. Patient noted some dysuria. Patient noted constipation. Patient noted no tremors.    Patient reported pain in both arms from hands to elbows and in both legs from feet to knees. Patient noted slight improvement in pain. Patient noted having daily headaches. Patient noted some improvement in weakness today.    Physical Exam   Vital Signs: Temp: 98.1  F (36.7  C) Temp src: Oral BP: 110/67 Pulse: 74   Resp: 18 SpO2: 97 % O2 Device: Nasal cannula Oxygen Delivery: 1 LPM  Weight: 166 lbs .1 oz    General: Patient comfortable, NAD.  Heart: RRR, S1 S2 w/o murmurs.  Lungs: Breath sounds present. No crackles/wheezes heard.  Abdomen: Soft. Discomfort present with palpation but no  guarding or rebound tenderness.    Data   Labs noted.   Sodium 138; Potassium 3.7; Creatinine 0.52; AST 57; ALT 34  WBC 4.5; Hb 7.6; Platelets 94

## 2022-12-09 NOTE — CONSULTS
Care Management Initial Consult    General Information  Assessment completed with: Patient,    Type of CM/SW Visit: Initial Assessment    Primary Care Provider verified and updated as needed: Yes   Readmission within the last 30 days: no previous admission in last 30 days      Reason for Consult: other (see comments) (Discharge Planning)  Advance Care Planning: Advance Care Planning Reviewed: no concerns identified        Communication Assessment  Patient's communication style: spoken language (English or Bilingual)    Hearing Difficulty or Deaf: no      Cognitive  Cognitive/Neuro/Behavioral: WDL                      Living Environment:   People in home: alone     Current living Arrangements: hotel/motel, shelter      Able to return to prior arrangements:       Family/Social Support:  Care provided by: self  Provides care for: no one, unable/limited ability to care for self  Marital Status: Single  Significant Other          Description of Support System: Involved, Other (see comments) (limited)    Support Assessment: Limited social contact and support    Current Resources:  Patient receiving home care services: No     Community Resources: County Worker  Equipment currently used at home: other (see comments) (blind cane)  Supplies currently used at home: None    Employment/Financial:  Employment Status: unemployed        Financial Concerns: No concerns identified   Referral to Financial Worker: No     Lifestyle & Psychosocial Needs:  Social Determinants of Health     Tobacco Use: High Risk     Smoking Tobacco Use: Every Day     Smokeless Tobacco Use: Never     Passive Exposure: Not on file   Alcohol Use: Not on file   Financial Resource Strain: Not on file   Food Insecurity: Not on file   Transportation Needs: Not on file   Physical Activity: Not on file   Stress: Not on file   Social Connections: Not on file   Intimate Partner Violence: Not on file   Depression: Not on file   Housing Stability: Not on file  "    Functional Status:  Prior to admission patient needed assistance: None     Mental Health Status:  Mental Health Status: Current Concern  Mental Health Management: Other (see comment) (Anxiety)    Chemical Dependency Status:  Chemical Dependency Status: Current Concern  Chemical Dependency Management: Other (see comment) (Mentions drinking in access intermittently) Has dx of Alcohol Use Disorder and Alcoholic Pancreatitis.        Values/Beliefs:  Spiritual, Cultural Beliefs, Zoroastrianism Practices, Values that affect care: no             Additional Information:  Per H&P, patient is a 62 year old male with h/o alcohol use disorder, alcoholic pancreatitis, COPD, hypokalemia, seizure disorder, microcytic anemia who presented due to concerns about seizure activity. Found have alcohol withdrawal and alcoholic ketoacidosis.  Writer met with patient in room to complete initial assessment due to discharge planning. Writer introduced self, role and duties to patient. Patient reports that he is homeless, residing in hotels, shelters and on the streets. He states that the most recent shelter he was in was [facility]. He has also stayed at the [facility]. He states that he was completing all ADLs and IADLs independently and that the only equipment he was using was a cane for the blind. He shares that he does not have any mental health concerns, but that he has been Anxious lately related to \"losing\" his girlfriend. He reports that he was drinking at a \"friend's party\" and got intoxicated [place]   He was brought into the Hospital and does not have a way to contact his girlfriend as she does not have a phone. He states he has been with his girlfriend for 37 years. He describes his Alcohol use as \"I don't drink all the time, but sometimes I drink 3 days straight.\" Writer did not discuss treatment with him at this time. Patient is disabled, due to blindness. He reports having " lost one of his eyes at 14 and his eyesight in other eye in 2004. He used to own a construction business. He states that he has a medical Power of  which names his mother as his healthcare agent, but it is not on file. His family resides in [place], [place]. SW to continue to follow patient for support and discharge planning needs.     JOYCELYN Nevarez, MSW  Adult Acute Care Float   Pager [phone]

## 2022-12-09 NOTE — PROGRESS NOTES
Progress note:    Pulmonary:Pt. Reports SOB with activity/movement (Not new).; On 1 L of oxygen via NC.    Output: 500 mL emptied from bedside urinal.    Activity:Mostly blind; Assist of one using bismark study    Skin: PI to L ear.    Pain:Pt. Reported moderate headache and abd pain and PRN tylenol was utilized with some relief verbalized.     Neuro/CMS:Pt. Is alert and oriented x4      IV/Drains: PIV to Right FA.    Other:   -Critical lab for Phosphorus; Mg, P and K replaced this evening.   -BG monitoring and sliding scale coverage.,    Plan:

## 2022-12-09 NOTE — PLAN OF CARE
"/64 (BP Location: Left arm, Patient Position: Supine, Cuff Size: Adult Regular)   Pulse 83   Temp 98.8  F (37.1  C) (Oral)   Resp 18   Ht 1.778 m (5' 10\")   Wt 75.3 kg (166 lb 0.1 oz)   SpO2 95%   BMI 23.82 kg/m      A&O x 3. Reports sob during exertion. 1 L oxygen  Phosphorus redraw result 1.8. Ordered replacement  Potassium phosphate currently  infusing. Redraw scheduled after completion of infusion.    Pt c/o mild headache.  Using bedside urine. voiding without difficulty  CIWA protocol. Scoring low  Seizure precautions maintained     Magnesium, potassium and phos redraw at 0600  "

## 2022-12-10 ENCOUNTER — TRANSFERRED RECORDS (OUTPATIENT)
Dept: MEDSURG UNIT | Facility: CLINIC | Age: 62
End: 2022-12-10

## 2022-12-10 ENCOUNTER — APPOINTMENT (OUTPATIENT)
Dept: GENERAL RADIOLOGY | Facility: CLINIC | Age: 62
End: 2022-12-10
Attending: INTERNAL MEDICINE
Payer: COMMERCIAL

## 2022-12-10 LAB
ALBUMIN UR-MCNC: 10 MG/DL
APPEARANCE UR: CLEAR
BILIRUB UR QL STRIP: NEGATIVE
COLOR UR AUTO: YELLOW
GLUCOSE BLDC GLUCOMTR-MCNC: 153 MG/DL (ref 70–99)
GLUCOSE BLDC GLUCOMTR-MCNC: 174 MG/DL (ref 70–99)
GLUCOSE BLDC GLUCOMTR-MCNC: 203 MG/DL (ref 70–99)
GLUCOSE BLDC GLUCOMTR-MCNC: 227 MG/DL (ref 70–99)
GLUCOSE UR STRIP-MCNC: 100 MG/DL
HGB UR QL STRIP: NEGATIVE
HOLD SPECIMEN: NORMAL
KETONES UR STRIP-MCNC: NEGATIVE MG/DL
LEUKOCYTE ESTERASE UR QL STRIP: NEGATIVE
LIPASE SERPL-CCNC: 106 U/L (ref 73–393)
MAGNESIUM SERPL-MCNC: 1.6 MG/DL (ref 1.6–2.3)
MUCOUS THREADS #/AREA URNS LPF: PRESENT /LPF
NITRATE UR QL: NEGATIVE
PH UR STRIP: 8 [PH] (ref 5–7)
PHOSPHATE SERPL-MCNC: 2.2 MG/DL (ref 2.5–4.5)
POTASSIUM BLD-SCNC: 3.8 MMOL/L (ref 3.4–5.3)
RBC URINE: 0 /HPF
SP GR UR STRIP: 1.02 (ref 1–1.03)
UROBILINOGEN UR STRIP-MCNC: NORMAL MG/DL
WBC URINE: 1 /HPF

## 2022-12-10 PROCEDURE — 99232 SBSQ HOSP IP/OBS MODERATE 35: CPT | Performed by: INTERNAL MEDICINE

## 2022-12-10 PROCEDURE — 81001 URINALYSIS AUTO W/SCOPE: CPT | Performed by: INTERNAL MEDICINE

## 2022-12-10 PROCEDURE — 250N000013 HC RX MED GY IP 250 OP 250 PS 637: Performed by: INTERNAL MEDICINE

## 2022-12-10 PROCEDURE — 83690 ASSAY OF LIPASE: CPT | Performed by: INTERNAL MEDICINE

## 2022-12-10 PROCEDURE — 84132 ASSAY OF SERUM POTASSIUM: CPT | Performed by: INTERNAL MEDICINE

## 2022-12-10 PROCEDURE — 83735 ASSAY OF MAGNESIUM: CPT | Performed by: INTERNAL MEDICINE

## 2022-12-10 PROCEDURE — 120N000002 HC R&B MED SURG/OB UMMC

## 2022-12-10 PROCEDURE — 74018 RADEX ABDOMEN 1 VIEW: CPT | Mod: 26 | Performed by: STUDENT IN AN ORGANIZED HEALTH CARE EDUCATION/TRAINING PROGRAM

## 2022-12-10 PROCEDURE — 84100 ASSAY OF PHOSPHORUS: CPT | Performed by: INTERNAL MEDICINE

## 2022-12-10 PROCEDURE — 74018 RADEX ABDOMEN 1 VIEW: CPT

## 2022-12-10 PROCEDURE — 93005 ELECTROCARDIOGRAM TRACING: CPT

## 2022-12-10 PROCEDURE — 36415 COLL VENOUS BLD VENIPUNCTURE: CPT | Performed by: INTERNAL MEDICINE

## 2022-12-10 RX ORDER — ONDANSETRON 4 MG/1
4-8 TABLET, ORALLY DISINTEGRATING ORAL EVERY 6 HOURS PRN
Status: DISCONTINUED | OUTPATIENT
Start: 2022-12-10 | End: 2022-12-25 | Stop reason: HOSPADM

## 2022-12-10 RX ORDER — ONDANSETRON 4 MG/1
4 TABLET, ORALLY DISINTEGRATING ORAL EVERY 8 HOURS PRN
Status: DISCONTINUED | OUTPATIENT
Start: 2022-12-10 | End: 2022-12-10

## 2022-12-10 RX ADMIN — Medication 1 TABLET: at 08:55

## 2022-12-10 RX ADMIN — FOLIC ACID 1 MG: 1 TABLET ORAL at 08:55

## 2022-12-10 RX ADMIN — LEVETIRACETAM 2000 MG: 500 TABLET, FILM COATED ORAL at 09:48

## 2022-12-10 RX ADMIN — INSULIN ASPART 0.5 UNITS: 100 INJECTION, SOLUTION INTRAVENOUS; SUBCUTANEOUS at 12:40

## 2022-12-10 RX ADMIN — GABAPENTIN 300 MG: 300 CAPSULE ORAL at 20:19

## 2022-12-10 RX ADMIN — CLONIDINE HYDROCHLORIDE 0.1 MG: 0.1 TABLET ORAL at 12:40

## 2022-12-10 RX ADMIN — CLONIDINE HYDROCHLORIDE 0.1 MG: 0.1 TABLET ORAL at 20:19

## 2022-12-10 RX ADMIN — PANCRELIPASE 3 CAPSULE: 60000; 12000; 38000 CAPSULE, DELAYED RELEASE PELLETS ORAL at 12:40

## 2022-12-10 RX ADMIN — ACETAMINOPHEN 650 MG: 325 TABLET, FILM COATED ORAL at 09:48

## 2022-12-10 RX ADMIN — PANTOPRAZOLE SODIUM 40 MG: 40 TABLET, DELAYED RELEASE ORAL at 17:12

## 2022-12-10 RX ADMIN — GABAPENTIN 300 MG: 300 CAPSULE ORAL at 08:55

## 2022-12-10 RX ADMIN — PANTOPRAZOLE SODIUM 40 MG: 40 TABLET, DELAYED RELEASE ORAL at 08:55

## 2022-12-10 RX ADMIN — POTASSIUM & SODIUM PHOSPHATES POWDER PACK 280-160-250 MG 1 PACKET: 280-160-250 PACK at 17:12

## 2022-12-10 RX ADMIN — THIAMINE HCL TAB 100 MG 100 MG: 100 TAB at 08:55

## 2022-12-10 RX ADMIN — LEVETIRACETAM 2000 MG: 500 TABLET, FILM COATED ORAL at 20:19

## 2022-12-10 RX ADMIN — GABAPENTIN 300 MG: 300 CAPSULE ORAL at 13:52

## 2022-12-10 RX ADMIN — POLYETHYLENE GLYCOL 3350 17 G: 17 POWDER, FOR SOLUTION ORAL at 08:55

## 2022-12-10 RX ADMIN — CLONIDINE HYDROCHLORIDE 0.1 MG: 0.1 TABLET ORAL at 04:20

## 2022-12-10 RX ADMIN — INSULIN ASPART 0.5 UNITS: 100 INJECTION, SOLUTION INTRAVENOUS; SUBCUTANEOUS at 08:51

## 2022-12-10 RX ADMIN — TAMSULOSIN HYDROCHLORIDE 0.4 MG: 0.4 CAPSULE ORAL at 08:55

## 2022-12-10 RX ADMIN — POTASSIUM & SODIUM PHOSPHATES POWDER PACK 280-160-250 MG 1 PACKET: 280-160-250 PACK at 12:40

## 2022-12-10 RX ADMIN — INSULIN ASPART 1 UNITS: 100 INJECTION, SOLUTION INTRAVENOUS; SUBCUTANEOUS at 17:12

## 2022-12-10 RX ADMIN — ACETAMINOPHEN 650 MG: 325 TABLET, FILM COATED ORAL at 04:25

## 2022-12-10 RX ADMIN — POTASSIUM & SODIUM PHOSPHATES POWDER PACK 280-160-250 MG 1 PACKET: 280-160-250 PACK at 08:55

## 2022-12-10 RX ADMIN — PANCRELIPASE 3 CAPSULE: 60000; 12000; 38000 CAPSULE, DELAYED RELEASE PELLETS ORAL at 09:02

## 2022-12-10 RX ADMIN — PANCRELIPASE 3 CAPSULE: 60000; 12000; 38000 CAPSULE, DELAYED RELEASE PELLETS ORAL at 17:12

## 2022-12-10 ASSESSMENT — ACTIVITIES OF DAILY LIVING (ADL)
ADLS_ACUITY_SCORE: 31
ADLS_ACUITY_SCORE: 31
ADLS_ACUITY_SCORE: 34
ADLS_ACUITY_SCORE: 31
ADLS_ACUITY_SCORE: 34
ADLS_ACUITY_SCORE: 31
ADLS_ACUITY_SCORE: 31

## 2022-12-10 NOTE — PROGRESS NOTES
A&Ox4, calm and cooperative, able to make needs known with call light in reach.  VSS on 1LO2 via NC, continent of b&b, LBM 12/10.  Denied SOB, CP, n/v.  Pt has baseline n/t in all extremities from elbows and knees down.  CIWA score 2.  RPIV SL.  Skin intact.  Continue with POC.

## 2022-12-10 NOTE — PROGRESS NOTES
[facility]    Medicine Progress Note - Hospitalist Service, GOLD TEAM 18    Date of Admission:  12/6/2022    Assessment & Plan     A: Patient is a 61 y/o man who has multiple medical problems including alcohol dependence, alcoholic pancreatitis, chronic pancreatitis, past pancreatic stents, seizure disorder, COPD, hypokalemia and microcytic anemia. Patient presented with concerns about seizure activity. Patient was found to have alcohol withdrawal and alcoholic ketoacidosis, both of which appear resolved.     P:  1.) Alcohol dependence with alcohol withdrawal; withdrawal appears resolved: Monitoring on CIWA protocol.  2.) Alcoholic ketoacidosis, appears resolved: No active intervention indicated at present.  3.) Hypoxemia of unclear cause, improving: Monitoring for additional symptoms. Titrating down oxygen support.  4.) Seizure disorder: Patient on seizure precautions for now. Patient back on keppra.  5.) Generalized weakness: PT/OT.  6.) Peripheral neuropathy: Patient back on gabapentin.  7.) Chronic pancreatitis: Patient on creon. Monitoring ability to tolerate diet. Antiemetics as needed for nausea.  8.) Microcytic anemia; iron deficiency anemia: Anticipate that patient will eventually be on supplemental iron as outpatient. Patient would benefit from colonoscopy as outpatient.  9.) Peripheral edema: Monitoring for additional symptoms.  10.) Covid-19 exposure; PCR was negative: Monitoring for symptoms. If patient has symptoms suggestive of Covid-19 infection, PCR will be repeated.  11.) Left lower lobe nodule: CT chest in 3 months as outpatient.  12.) COPD: Patient compensated. Monitoring for changes.  13.) Blindness - patient has history of left eye enucleation and patient has poor vision in right eye: Monitoring for safety.  14.) Diabetes mellitus type II; patient listed as being on metformin as outpatient: Metformin on hold for now. Patient on insulin sliding  scale.  15.) Hypokalemia, hypomagnesemia, hypophosphatemia: Supplementing as needed.  16.) Dysuria: Will check post-void residuals.  17.) Constipation: Bowel regimen.  18.) Generalized abdominal discomfort: Will obtain abdomina x-ray.  19.) Left ear stage 2 pressure injury, hospital acquired: Wound care.       Diet: Combination Diet Regular Diet Adult    DVT Prophylaxis: Pneumatic compression device ordered  Trejo Catheter: Not present  Central Lines: None  Cardiac Monitoring: None  Code Status: Full Code        Wilbur Hercules MD  Hospitalist Service, GOLD TEAM 18  [facility]  Securely message with the Vocera Web Console (learn more here)  Text page via [facility] Paging/Directory   Please see signed in provider for up to date coverage information      Clinically Significant Risk Factors            # Hypomagnesemia: Lowest Mg = 1.6 mg/dL in last 2 days, will replace as needed   # Hypoalbuminemia: Lowest albumin = 2.5 g/dL at 12/9/2022  7:10 AM, will monitor as appropriate   # Thrombocytopenia: Lowest platelets = 94 in last 2 days, will monitor for bleeding        # DMII: A1C = 6.7 % (Ref range: <5.7 %) within past 3 months           ______________________________________________________________________    Interval History     Patient noted continued abdominal discomfort and noted still having nausea but no vomiting. Patient noted constipation yesterday but noted a small amount of diarrhea today.    Physical Exam   Vital Signs: Temp: 99.4  F (37.4  C) Temp src: Oral BP: 112/63 Pulse: 89   Resp: 18 SpO2: 97 % O2 Device: None (Room air) Oxygen Delivery: 1 LPM  Weight: 166 lbs .1 oz    General: Patient comfortable, NAD.  Heart: RRR, S1 S2 w/o murmurs.  Lungs: Breath sounds present. No crackles/wheezes heard.  Abdomen: Soft. Discomfort with palpation diffusely but no guarding or rebound tenderness.    Data   Urinalysis: Nitrite negative, Leucocyte Esterase negative; WBC 1

## 2022-12-10 NOTE — PLAN OF CARE
End of shift Summary: See flowsheet for VS and detail assessments.  Alert and oriented X 4  CIWA score is 3  Denied SOB or chest pain  Output: Voids spontaneously using a urinal and commode at bedside  LBM - 12/09/2022  Assist of 1  Numbness and tingling on both extremities  Pain:C/O abdominal pain that subsided after he had a BM  IV/Drains: R.PIV saline locked    Plan: Continue with POC

## 2022-12-11 ENCOUNTER — APPOINTMENT (OUTPATIENT)
Dept: CT IMAGING | Facility: CLINIC | Age: 62
End: 2022-12-11
Attending: INTERNAL MEDICINE
Payer: COMMERCIAL

## 2022-12-11 ENCOUNTER — APPOINTMENT (OUTPATIENT)
Dept: PHYSICAL THERAPY | Facility: CLINIC | Age: 62
End: 2022-12-11
Payer: COMMERCIAL

## 2022-12-11 LAB
ANION GAP SERPL CALCULATED.3IONS-SCNC: 6 MMOL/L (ref 3–14)
BUN SERPL-MCNC: 9 MG/DL (ref 7–30)
CALCIUM SERPL-MCNC: 8.9 MG/DL (ref 8.5–10.1)
CHLORIDE BLD-SCNC: 104 MMOL/L (ref 94–109)
CO2 SERPL-SCNC: 23 MMOL/L (ref 20–32)
CREAT SERPL-MCNC: 0.51 MG/DL (ref 0.66–1.25)
ERYTHROCYTE [DISTWIDTH] IN BLOOD BY AUTOMATED COUNT: 21.2 % (ref 10–15)
GFR SERPL CREATININE-BSD FRML MDRD: >90 ML/MIN/1.73M2
GLUCOSE BLD-MCNC: 175 MG/DL (ref 70–99)
GLUCOSE BLDC GLUCOMTR-MCNC: 154 MG/DL (ref 70–99)
GLUCOSE BLDC GLUCOMTR-MCNC: 164 MG/DL (ref 70–99)
GLUCOSE BLDC GLUCOMTR-MCNC: 176 MG/DL (ref 70–99)
GLUCOSE BLDC GLUCOMTR-MCNC: 194 MG/DL (ref 70–99)
GLUCOSE BLDC GLUCOMTR-MCNC: 206 MG/DL (ref 70–99)
HCT VFR BLD AUTO: 25.7 % (ref 40–53)
HGB BLD-MCNC: 8.2 G/DL (ref 13.3–17.7)
MCH RBC QN AUTO: 23 PG (ref 26.5–33)
MCHC RBC AUTO-ENTMCNC: 31.9 G/DL (ref 31.5–36.5)
MCV RBC AUTO: 72 FL (ref 78–100)
PHOSPHATE SERPL-MCNC: 3 MG/DL (ref 2.5–4.5)
PLATELET # BLD AUTO: 114 10E3/UL (ref 150–450)
POTASSIUM BLD-SCNC: 4 MMOL/L (ref 3.4–5.3)
RBC # BLD AUTO: 3.56 10E6/UL (ref 4.4–5.9)
SODIUM SERPL-SCNC: 133 MMOL/L (ref 133–144)
WBC # BLD AUTO: 7.4 10E3/UL (ref 4–11)

## 2022-12-11 PROCEDURE — 250N000013 HC RX MED GY IP 250 OP 250 PS 637: Performed by: INTERNAL MEDICINE

## 2022-12-11 PROCEDURE — 36415 COLL VENOUS BLD VENIPUNCTURE: CPT | Performed by: INTERNAL MEDICINE

## 2022-12-11 PROCEDURE — 97110 THERAPEUTIC EXERCISES: CPT | Mod: GP

## 2022-12-11 PROCEDURE — 74177 CT ABD & PELVIS W/CONTRAST: CPT

## 2022-12-11 PROCEDURE — 250N000011 HC RX IP 250 OP 636: Performed by: NURSE PRACTITIONER

## 2022-12-11 PROCEDURE — 80048 BASIC METABOLIC PNL TOTAL CA: CPT | Performed by: INTERNAL MEDICINE

## 2022-12-11 PROCEDURE — 97530 THERAPEUTIC ACTIVITIES: CPT | Mod: GP

## 2022-12-11 PROCEDURE — 250N000009 HC RX 250: Performed by: INTERNAL MEDICINE

## 2022-12-11 PROCEDURE — 84100 ASSAY OF PHOSPHORUS: CPT | Performed by: INTERNAL MEDICINE

## 2022-12-11 PROCEDURE — 85027 COMPLETE CBC AUTOMATED: CPT | Performed by: INTERNAL MEDICINE

## 2022-12-11 PROCEDURE — 250N000011 HC RX IP 250 OP 636: Performed by: INTERNAL MEDICINE

## 2022-12-11 PROCEDURE — 97116 GAIT TRAINING THERAPY: CPT | Mod: GP

## 2022-12-11 PROCEDURE — 99232 SBSQ HOSP IP/OBS MODERATE 35: CPT | Performed by: INTERNAL MEDICINE

## 2022-12-11 PROCEDURE — 74177 CT ABD & PELVIS W/CONTRAST: CPT | Mod: 26 | Performed by: RADIOLOGY

## 2022-12-11 PROCEDURE — 120N000002 HC R&B MED SURG/OB UMMC

## 2022-12-11 RX ORDER — IOPAMIDOL 755 MG/ML
100 INJECTION, SOLUTION INTRAVASCULAR ONCE
Status: COMPLETED | OUTPATIENT
Start: 2022-12-11 | End: 2022-12-11

## 2022-12-11 RX ADMIN — Medication 1 TABLET: at 09:52

## 2022-12-11 RX ADMIN — GABAPENTIN 300 MG: 300 CAPSULE ORAL at 14:17

## 2022-12-11 RX ADMIN — ONDANSETRON 4 MG: 4 TABLET, ORALLY DISINTEGRATING ORAL at 09:46

## 2022-12-11 RX ADMIN — INSULIN ASPART 0.5 UNITS: 100 INJECTION, SOLUTION INTRAVENOUS; SUBCUTANEOUS at 17:16

## 2022-12-11 RX ADMIN — PANTOPRAZOLE SODIUM 40 MG: 40 TABLET, DELAYED RELEASE ORAL at 09:52

## 2022-12-11 RX ADMIN — PANCRELIPASE 3 CAPSULE: 60000; 12000; 38000 CAPSULE, DELAYED RELEASE PELLETS ORAL at 09:55

## 2022-12-11 RX ADMIN — GABAPENTIN 300 MG: 300 CAPSULE ORAL at 20:35

## 2022-12-11 RX ADMIN — LEVETIRACETAM 2000 MG: 500 TABLET, FILM COATED ORAL at 20:35

## 2022-12-11 RX ADMIN — GABAPENTIN 300 MG: 300 CAPSULE ORAL at 09:52

## 2022-12-11 RX ADMIN — LEVETIRACETAM 2000 MG: 500 TABLET, FILM COATED ORAL at 09:52

## 2022-12-11 RX ADMIN — CLONIDINE HYDROCHLORIDE 0.1 MG: 0.1 TABLET ORAL at 20:35

## 2022-12-11 RX ADMIN — CLONIDINE HYDROCHLORIDE 0.1 MG: 0.1 TABLET ORAL at 12:58

## 2022-12-11 RX ADMIN — FOLIC ACID 1 MG: 1 TABLET ORAL at 09:52

## 2022-12-11 RX ADMIN — INSULIN ASPART 0.5 UNITS: 100 INJECTION, SOLUTION INTRAVENOUS; SUBCUTANEOUS at 09:57

## 2022-12-11 RX ADMIN — PANCRELIPASE 3 CAPSULE: 60000; 12000; 38000 CAPSULE, DELAYED RELEASE PELLETS ORAL at 18:02

## 2022-12-11 RX ADMIN — PANTOPRAZOLE SODIUM 40 MG: 40 TABLET, DELAYED RELEASE ORAL at 17:11

## 2022-12-11 RX ADMIN — ACETAMINOPHEN 650 MG: 325 TABLET, FILM COATED ORAL at 11:08

## 2022-12-11 RX ADMIN — ONDANSETRON 4 MG: 4 TABLET, ORALLY DISINTEGRATING ORAL at 00:08

## 2022-12-11 RX ADMIN — IOPAMIDOL 81 ML: 755 INJECTION, SOLUTION INTRAVENOUS at 15:45

## 2022-12-11 RX ADMIN — CLONIDINE HYDROCHLORIDE 0.1 MG: 0.1 TABLET ORAL at 04:37

## 2022-12-11 RX ADMIN — THIAMINE HCL TAB 100 MG 100 MG: 100 TAB at 09:52

## 2022-12-11 RX ADMIN — TAMSULOSIN HYDROCHLORIDE 0.4 MG: 0.4 CAPSULE ORAL at 09:52

## 2022-12-11 RX ADMIN — INSULIN ASPART 0.5 UNITS: 100 INJECTION, SOLUTION INTRAVENOUS; SUBCUTANEOUS at 12:59

## 2022-12-11 RX ADMIN — PANCRELIPASE 3 CAPSULE: 60000; 12000; 38000 CAPSULE, DELAYED RELEASE PELLETS ORAL at 12:59

## 2022-12-11 RX ADMIN — ACETAMINOPHEN 650 MG: 325 TABLET, FILM COATED ORAL at 04:37

## 2022-12-11 RX ADMIN — SODIUM CHLORIDE 60 ML: 9 INJECTION, SOLUTION INTRAVENOUS at 15:45

## 2022-12-11 ASSESSMENT — ACTIVITIES OF DAILY LIVING (ADL)
ADLS_ACUITY_SCORE: 34
ADLS_ACUITY_SCORE: 38
ADLS_ACUITY_SCORE: 34

## 2022-12-11 NOTE — PROGRESS NOTES
[facility]    Medicine Progress Note - Hospitalist Service, GOLD TEAM 18    Date of Admission:  12/6/2022    Assessment & Plan     A: Patient is a 63 y/o man who has multiple medical problems including alcohol dependence, alcoholic pancreatitis, chronic pancreatitis, past pancreatic stents, seizure disorder, COPD, hypokalemia and microcytic anemia. Patient presented with concerns about seizure activity. Patient was found to have alcohol withdrawal and alcoholic ketoacidosis, both of which appear resolved.     P:  1.) Alcohol dependence with alcohol withdrawal; withdrawal appears resolved: Monitoring for changes.  2.) Alcoholic ketoacidosis, appears resolved: No active intervention indicated at present.  3.) Hypoxemia of unclear cause, patient now on room air: Monitoring for additional symptoms. Monitoring respiratory status.  4.) Seizure disorder: Patient on seizure precautions for now. Patient back on keppra.  5.) Generalized weakness: PT/OT.  6.) Peripheral neuropathy: Patient back on gabapentin.  7.) Chronic pancreatitis: Patient on creon.   8.) Microcytic anemia; iron deficiency anemia: Anticipate that patient will eventually be on supplemental iron as outpatient. Patient would benefit from colonoscopy as outpatient.  9.) Peripheral edema: Monitoring for additional symptoms.  10.) Covid-19 exposure; PCR was negative: Monitoring for symptoms. If patient has symptoms suggestive of Covid-19 infection, PCR will be repeated.  11.) Left lower lobe nodule: CT chest in 3 months as outpatient.  12.) COPD: Patient compensated. Monitoring for changes.  13.) Blindness - patient has history of left eye enucleation and patient has poor vision in right eye: Monitoring for safety.  14.) Diabetes mellitus type II; patient listed as being on metformin as outpatient: Metformin on hold for now. Patient on insulin sliding scale.  15.) Hypokalemia, hypomagnesemia, hypophosphatemia:  Supplementing as needed.  16.) Dysuria - patient had negative urinalysis and did not appear to have urinary retention: Monitoring for additional symptoms.  17.) Constipation: Bowel regimen.  18.) Generalized abdominal discomfort: In light of increasing abdominal discomfort, increased nausea and abnormal abdominal x-ray, will obtain CT abdomen/pelvis with contrast.  19.) Left ear stage 2 pressure injury, hospital acquired: Wound care.      Wilbur Hercules MD  Hospitalist Service, [address]  Securely message with the Vocera Web Console (learn more here)  Text page via [facility] Paging/Directory   Please see signed in provider for up to date coverage information      Clinically Significant Risk Factors         # Hyponatremia: Lowest Na = 133 mmol/L in last 2 days, will monitor as appropriate    # Hypomagnesemia: Lowest Mg = 1.6 mg/dL in last 2 days, will replace as needed   # Hypoalbuminemia: Lowest albumin = 2.5 g/dL at 12/9/2022  7:10 AM, will monitor as appropriate   # Thrombocytopenia: Lowest platelets = 114 in last 2 days, will monitor for bleeding        # DMII: A1C = 6.7 % (Ref range: <5.7 %) within past 3 months           ______________________________________________________________________    Interval History     Patient noted more abdominal discomfort in low abdomen. Patient noted feeling hungry but being unable to eat because of nausea. Patient noted having a bowel movement today.    Physical Exam   Vital Signs: Temp: 99.2  F (37.3  C) Temp src: Oral BP: 106/57 Pulse: 69   Resp: 18 SpO2: 97 % O2 Device: None (Room air)    Weight: 166 lbs .1 oz    General: Patient comfortable, NAD.  Heart: RRR, S1 S2 w/o murmurs.  Lungs: Breath sounds present. No crackles/wheezes heard.  Abdomen: Bowel sounds present. Discomfort with palpation of left lower quadrant.    Data   Labs noted.  Sodium 133; Potassium 4.0; Creatinine 0.51  WBC 7.4; Hb 8.2; Platelets  114    Abdominal pain:  1. Moderate to large distention of the stomach with ingested contents,  may be seen with gastroparesis versus gastric outlet  obstruction/proximal bowel obstruction. Consider CT abdomen if  clinically desired.  2. Mild to moderate colonic stool burden.

## 2022-12-11 NOTE — PROGRESS NOTES
A&Ox4, calm and cooperative, able to make needs known with call light in reach.  VSS on RA, oximetry on overnight, continent of b&b.  Assist of 1 with walker and gait belt, uses urinal and commode at bedside.  C/o n/t in all extremities from knees down and elbows down.  PVR bladder scanning completed and documented.  CIWA scoring 2.  RPIV SL.  Pt reported nausea which prevented him from eating dinner, provider paged, PRN zofran given.  Tylenol given for pain this shift.  Continue with POC.

## 2022-12-11 NOTE — PLAN OF CARE
End of shift Summary: See flowsheet for VS and detail assessments.  Alert and oriented X4  C/o of  Numbness and tingling on both extremities  Assist of 1   CIWA score is 1  BG before meals and at bedtime  Oxygen taken off to monitor at room air @ 16:25 it was 96% R.A  Post void residual urine was done X3 and documented as ordered  UA was collected and sent to the lab  X-ray was done with findings of mild to moderate colonic stool burden  Urinal and commode at bedside  No BM this shift but earlier last BM-12/10/2022  Pain was managed with tylenol  R.PIV saline locked    Plan: Continue with POC

## 2022-12-11 NOTE — PROGRESS NOTES
Care Management Follow Up    Length of Stay (days): 5    Expected Discharge Date: TBD, pending placement for TCU     Concerns to be Addressed: Discharge planning     Patient plan of care discussed at interdisciplinary rounds: Yes    Anticipated Discharge Disposition: Transitional Care     Anticipated Discharge Services: None  Anticipated Discharge DME: None    Patient/family educated on Medicare website which has current facility and service quality ratings: Yes  Education Provided on the Discharge Plan: Yes    Patient/Family in Agreement with the Plan: Yes    Referrals Placed by CM/SW: External Care Coordination  Private pay costs discussed: Not applicable    Additional Information:  Social work reached out to the following facilities for updates. See details below.    Pending:  [facility] ([facility])  P: [phone]   F: [phone]   **[facility]    12/11- Social work resent referral as it was not sent the right way at [facility]     [facility] ([facility])  P: [phone]   F: [phone]  **[facility]    12/11- Social work resent referral as it was not sent the right way at [facility]  [address] [place], [place] [id number] [phone]    12/11- Called to follow up. No answer but left a voicemail requesting a call back.     [facility]  [address] [place], [place] [id number] [phone]    12/11- Called to follow up. No answer but left a voicemail requesting a call back.     [facility]  [address] [place], [place] [id number] [phone]    12/11- SW was unable to leave a voicemail as call ended abruptly. SW will follow up again tomorrow.      [facility]  [address] [place], [place] [id number] [phone]    12/11- Called to follow up. No answer but left a voicemail requesting a call back.     [facility]  [address] [place] [address]  [phone]    12/11- Called to follow up. No answer but left a voicemail requesting a call back.     [facility]  [address] [place] [address]  [phone]    12/11- Called to follow up. No answer but left a voicemail requesting a call back.     [facility]  [address] [place], [place] [id number] [phone]    12/11- Called to follow up. No answer but left a voicemail requesting a call back.     Declined:  [facility]  [address] [place] [address]  [phone]  12/11- This facility does not have a TCU    [facility]  [address] [place] [place] [address]  [phone]  12/11- No male beds at this time     RAJ Garcia, LGSW  5 Med Surg and 10 ICU   [facility]  Phone: [phone]  Pager: [phone]

## 2022-12-12 ENCOUNTER — APPOINTMENT (OUTPATIENT)
Dept: PHYSICAL THERAPY | Facility: CLINIC | Age: 62
End: 2022-12-12
Payer: COMMERCIAL

## 2022-12-12 ENCOUNTER — APPOINTMENT (OUTPATIENT)
Dept: OCCUPATIONAL THERAPY | Facility: CLINIC | Age: 62
End: 2022-12-12
Payer: COMMERCIAL

## 2022-12-12 LAB
ATRIAL RATE - MUSE: 83 BPM
CREAT SERPL-MCNC: 0.56 MG/DL (ref 0.66–1.25)
DIASTOLIC BLOOD PRESSURE - MUSE: NORMAL MMHG
GFR SERPL CREATININE-BSD FRML MDRD: >90 ML/MIN/1.73M2
GLUCOSE BLDC GLUCOMTR-MCNC: 153 MG/DL (ref 70–99)
GLUCOSE BLDC GLUCOMTR-MCNC: 188 MG/DL (ref 70–99)
GLUCOSE BLDC GLUCOMTR-MCNC: 193 MG/DL (ref 70–99)
GLUCOSE BLDC GLUCOMTR-MCNC: 202 MG/DL (ref 70–99)
GLUCOSE BLDC GLUCOMTR-MCNC: 246 MG/DL (ref 70–99)
INTERPRETATION ECG - MUSE: NORMAL
MAGNESIUM SERPL-MCNC: 1.7 MG/DL (ref 1.6–2.3)
P AXIS - MUSE: 81 DEGREES
PHOSPHATE SERPL-MCNC: 4.2 MG/DL (ref 2.5–4.5)
PLATELET # BLD AUTO: 127 10E3/UL (ref 150–450)
POTASSIUM BLD-SCNC: 4.2 MMOL/L (ref 3.4–5.3)
PR INTERVAL - MUSE: 132 MS
QRS DURATION - MUSE: 88 MS
QT - MUSE: 364 MS
QTC - MUSE: 427 MS
R AXIS - MUSE: -5 DEGREES
SYSTOLIC BLOOD PRESSURE - MUSE: NORMAL MMHG
T AXIS - MUSE: 27 DEGREES
VENTRICULAR RATE- MUSE: 83 BPM

## 2022-12-12 PROCEDURE — 250N000013 HC RX MED GY IP 250 OP 250 PS 637: Performed by: INTERNAL MEDICINE

## 2022-12-12 PROCEDURE — 99232 SBSQ HOSP IP/OBS MODERATE 35: CPT | Performed by: INTERNAL MEDICINE

## 2022-12-12 PROCEDURE — 85049 AUTOMATED PLATELET COUNT: CPT | Performed by: INTERNAL MEDICINE

## 2022-12-12 PROCEDURE — 84100 ASSAY OF PHOSPHORUS: CPT | Performed by: INTERNAL MEDICINE

## 2022-12-12 PROCEDURE — G0463 HOSPITAL OUTPT CLINIC VISIT: HCPCS

## 2022-12-12 PROCEDURE — 120N000002 HC R&B MED SURG/OB UMMC

## 2022-12-12 PROCEDURE — 36415 COLL VENOUS BLD VENIPUNCTURE: CPT | Performed by: INTERNAL MEDICINE

## 2022-12-12 PROCEDURE — 97535 SELF CARE MNGMENT TRAINING: CPT | Mod: GO

## 2022-12-12 PROCEDURE — 84132 ASSAY OF SERUM POTASSIUM: CPT | Performed by: INTERNAL MEDICINE

## 2022-12-12 PROCEDURE — 97116 GAIT TRAINING THERAPY: CPT | Mod: GP

## 2022-12-12 PROCEDURE — 83735 ASSAY OF MAGNESIUM: CPT | Performed by: INTERNAL MEDICINE

## 2022-12-12 PROCEDURE — 97110 THERAPEUTIC EXERCISES: CPT | Mod: GP

## 2022-12-12 PROCEDURE — 250N000011 HC RX IP 250 OP 636: Performed by: INTERNAL MEDICINE

## 2022-12-12 PROCEDURE — 82565 ASSAY OF CREATININE: CPT | Performed by: INTERNAL MEDICINE

## 2022-12-12 PROCEDURE — 250N000011 HC RX IP 250 OP 636: Performed by: NURSE PRACTITIONER

## 2022-12-12 RX ORDER — MAGNESIUM SULFATE HEPTAHYDRATE 40 MG/ML
2 INJECTION, SOLUTION INTRAVENOUS ONCE
Status: COMPLETED | OUTPATIENT
Start: 2022-12-12 | End: 2022-12-12

## 2022-12-12 RX ADMIN — INSULIN ASPART 1 UNITS: 100 INJECTION, SOLUTION INTRAVENOUS; SUBCUTANEOUS at 11:57

## 2022-12-12 RX ADMIN — ACETAMINOPHEN 650 MG: 325 TABLET, FILM COATED ORAL at 04:11

## 2022-12-12 RX ADMIN — CLONIDINE HYDROCHLORIDE 0.1 MG: 0.1 TABLET ORAL at 20:38

## 2022-12-12 RX ADMIN — PANCRELIPASE 3 CAPSULE: 60000; 12000; 38000 CAPSULE, DELAYED RELEASE PELLETS ORAL at 08:52

## 2022-12-12 RX ADMIN — CLONIDINE HYDROCHLORIDE 0.1 MG: 0.1 TABLET ORAL at 04:11

## 2022-12-12 RX ADMIN — TAMSULOSIN HYDROCHLORIDE 0.4 MG: 0.4 CAPSULE ORAL at 08:42

## 2022-12-12 RX ADMIN — LEVETIRACETAM 2000 MG: 500 TABLET, FILM COATED ORAL at 08:41

## 2022-12-12 RX ADMIN — LEVETIRACETAM 2000 MG: 500 TABLET, FILM COATED ORAL at 20:39

## 2022-12-12 RX ADMIN — CLONIDINE HYDROCHLORIDE 0.1 MG: 0.1 TABLET ORAL at 11:55

## 2022-12-12 RX ADMIN — ONDANSETRON 4 MG: 4 TABLET, ORALLY DISINTEGRATING ORAL at 16:27

## 2022-12-12 RX ADMIN — ACETAMINOPHEN 650 MG: 325 TABLET, FILM COATED ORAL at 11:55

## 2022-12-12 RX ADMIN — MAGNESIUM SULFATE 2 G: 2 INJECTION INTRAVENOUS at 10:41

## 2022-12-12 RX ADMIN — GABAPENTIN 300 MG: 300 CAPSULE ORAL at 20:39

## 2022-12-12 RX ADMIN — GABAPENTIN 300 MG: 300 CAPSULE ORAL at 08:42

## 2022-12-12 RX ADMIN — ACETAMINOPHEN 650 MG: 325 TABLET, FILM COATED ORAL at 20:42

## 2022-12-12 RX ADMIN — GABAPENTIN 300 MG: 300 CAPSULE ORAL at 16:27

## 2022-12-12 RX ADMIN — ONDANSETRON 4 MG: 4 TABLET, ORALLY DISINTEGRATING ORAL at 04:23

## 2022-12-12 RX ADMIN — PANCRELIPASE 3 CAPSULE: 60000; 12000; 38000 CAPSULE, DELAYED RELEASE PELLETS ORAL at 11:56

## 2022-12-12 RX ADMIN — INSULIN ASPART 0.5 UNITS: 100 INJECTION, SOLUTION INTRAVENOUS; SUBCUTANEOUS at 16:27

## 2022-12-12 RX ADMIN — PANTOPRAZOLE SODIUM 40 MG: 40 TABLET, DELAYED RELEASE ORAL at 16:27

## 2022-12-12 RX ADMIN — PANTOPRAZOLE SODIUM 40 MG: 40 TABLET, DELAYED RELEASE ORAL at 08:42

## 2022-12-12 RX ADMIN — INSULIN ASPART 0.5 UNITS: 100 INJECTION, SOLUTION INTRAVENOUS; SUBCUTANEOUS at 08:59

## 2022-12-12 RX ADMIN — FOLIC ACID 1 MG: 1 TABLET ORAL at 08:42

## 2022-12-12 RX ADMIN — Medication 1 TABLET: at 08:42

## 2022-12-12 ASSESSMENT — ACTIVITIES OF DAILY LIVING (ADL)
ADLS_ACUITY_SCORE: 25
ADLS_ACUITY_SCORE: 25
ADLS_ACUITY_SCORE: 38
ADLS_ACUITY_SCORE: 28
ADLS_ACUITY_SCORE: 38
ADLS_ACUITY_SCORE: 27
ADLS_ACUITY_SCORE: 27
ADLS_ACUITY_SCORE: 38
ADLS_ACUITY_SCORE: 28
ADLS_ACUITY_SCORE: 38
ADLS_ACUITY_SCORE: 27
ADLS_ACUITY_SCORE: 38

## 2022-12-12 NOTE — PROGRESS NOTES
[facility]    Medicine Progress Note - Hospitalist Service, GOLD TEAM 18    Date of Admission:  12/6/2022    Assessment & Plan     A: Patient is a 63 y/o man who has multiple medical problems including alcohol dependence, alcoholic pancreatitis, chronic pancreatitis, past pancreatic stents, seizure disorder, COPD, hypokalemia and microcytic anemia. Patient presented with concerns about seizure activity. Patient was found to have alcohol withdrawal and alcoholic ketoacidosis, both of which appear resolved.     P:  1.) Alcohol dependence with alcohol withdrawal; withdrawal appears resolved: Monitoring for changes.  2.) Alcoholic ketoacidosis, appears resolved: No active intervention indicated at present.  3.) Hypoxemia of unclear cause, patient now on room air: Monitoring for additional symptoms. Monitoring respiratory status.  4.) Seizure disorder: Patient on seizure precautions for now. Patient back on keppra.  5.) Generalized weakness: PT/OT.  6.) Peripheral neuropathy: Patient back on gabapentin.  7.) Chronic pancreatitis: Patient on creon.   8.) Microcytic anemia; iron deficiency anemia: Anticipate that patient will eventually be on supplemental iron as outpatient. Patient would benefit from colonoscopy as outpatient.  9.) Peripheral edema: Monitoring for additional symptoms.  10.) Covid-19 exposure; PCR was negative: Monitoring for symptoms. If patient has symptoms suggestive of Covid-19 infection, PCR will be repeated.  11.) Left lower lobe nodule: CT chest in 3 months as outpatient.  12.) COPD: Patient compensated. Monitoring for changes.  13.) Blindness - patient has history of left eye enucleation and patient has poor vision in right eye: Monitoring for safety.  14.) Diabetes mellitus type II; patient listed as being on metformin as outpatient: Metformin on hold for now. Patient on insulin sliding scale.  15.) Hypokalemia, hypomagnesemia, hypophosphatemia:  Supplementing as needed.  16.) Dysuria - patient had negative urinalysis and did not appear to have urinary retention: Monitoring for additional symptoms.  17.) Constipation: Bowel regimen.  18.) Generalized abdominal discomfort of unclear cause: Acetaminophen as needed. Monitoring for additional symptoms.  19.) Left ear stage 2 pressure injury, hospital acquired: Wound care.       Diet: Combination Diet Regular Diet Adult    DVT Prophylaxis: Pneumatic compression device ordered.  Trejo Catheter: Not present  Central Lines: None  Cardiac Monitoring: None  Code Status: Full Code        Wilbur Hercules MD  Hospitalist Service, GOLD TEAM [address] [place] [address]  Securely message with the Vocera Web Console (learn more here)  Text page via [facility] Paging/Directory   Please see signed in provider for up to date coverage information      Clinically Significant Risk Factors         # Hyponatremia: Lowest Na = 133 mmol/L in last 2 days, will monitor as appropriate      # Hypoalbuminemia: Lowest albumin = 2.5 g/dL at 12/9/2022  7:10 AM, will monitor as appropriate   # Thrombocytopenia: Lowest platelets = 114 in last 2 days, will monitor for bleeding        # DMII: A1C = 6.7 % (Ref range: <5.7 %) within past 3 months           ______________________________________________________________________    Interval History     Patient noted constipation followed by passing a small amount of diarrhea. Patient noted still having abdominal discomfort. Patient noted tolerating some oral intake. Patient noted nausea. Patient noted 2 episodes of vomiting.     Patient noted some dyspnea with exertion.    Physical Exam   Vital Signs: Temp: 98  F (36.7  C) Temp src: Oral BP: 102/58 Pulse: 67   Resp: 22 SpO2: 100 % O2 Device: Nasal cannula Oxygen Delivery: 1 LPM  Weight: 166 lbs .1 oz    General: Patient comfortable, NAD.  Heart: Occasionally irregular, S1 S2 w/o murmurs.  Lungs: Breath sounds present. No  crackles/wheezes heard.  Abdomen: Soft. Discomfort with palpation diffusely.    Data   CT abdomen/pelvis:  1. Multiple pancreatic calcifications with slight prominence of the  pancreatic duct, consistent with history of chronic pancreatitis.  2. Cholelithiasis without findings to suggest acute cholecystitis.  3. Mild hepatomegaly. A 9 mm hepatic hypodensity adjacent to the  gallbladder fossa appears fairly well circumscribed suggesting small  cyst or hemangioma. Can consider further evaluation with a liver  protocol MRI with contrast versus attention on follow-up CT. If the  patient has any prior outside imaging of this region, correlation is  seen suggested.  4. Partially visualized circumferential wall thickening of the distal  esophagus, which may suggest esophagitis.  5. Additional stable chronic findings as described in the report.

## 2022-12-12 NOTE — PROGRESS NOTES
Care Management Follow Up     Length of Stay (days): 6     Expected Discharge Date: TBD, pending placement for TCU     Concerns to be Addressed: Discharge planning     Patient plan of care discussed at interdisciplinary rounds: Yes     Anticipated Discharge Disposition: Transitional Care     Anticipated Discharge Services: None  Anticipated Discharge DME: None     Patient/family educated on Medicare website which has current facility and service quality ratings: Yes  Education Provided on the Discharge Plan: Yes    Patient/Family in Agreement with the Plan: Yes     Referrals Placed by CM/SW: External Care Coordination  Private pay costs discussed: Not applicable     Additional Information:  Social work reached out to the following facilities for updates. See details below.      Pending:  [facility] ([facility])  P: [phone]   F: [phone]  **[facility]     12/11- Social work resent referral as it was not sent the right way at first      [facility]  [address] [place], [place] [id number] [phone]     12/11- Called to follow up. No answer but left a voicemail requesting a call back.     [facility]  [address] [place], [place] [id number] [phone]     12/11- Called to follow up. No answer but left a voicemail requesting a call back.     [facility] [place]  [address] [place], [place] [id number] [phone]     12/11- SW was unable to leave a voicemail as call ended abruptly. SW will follow up again tomorrow.      [facility]  [address] [place], [place] [id number] [phone]     12/11- Called to follow up. No answer but left a voicemail requesting a call back.     [facility]  [address] [place], [place] [address] [phone]     12/11- Called to follow up. No answer but left a voicemail requesting a call back.     [facility]  [address]  [place] [address]  [phone]     12/11- Called to follow up. No answer but left a voicemail requesting a call back.  12/12- Received a voicemail, SW to call back in the morning regarding placement.      [facility]  [address] [place], [place] [id number] [phone]     12/11- Called to follow up. No answer but left a voicemail requesting a call back.     Declined:  [facility]  [address] [place], [place] [address] [phone]  12/11- This facility does not have a TCU     [facility]  [address] [place], [place] [address] [phone]  12/11- No male beds at this time     [facility] ([facility])  P: [phone]   F: [phone]   **[facility]  12/12- Declined due to background check     RAJ Garcia, LGSW  5 Med Surg and 10 ICU   [facility]  Phone: [phone]  Pager: [phone]

## 2022-12-12 NOTE — PLAN OF CARE
Dallas Deluca is a 62 year old male who is here for ETOH, DKA (resolved), and pancreatitis (resolved).    NURSING ASSESSMENT:  Pt worked with OT today, walked around room and stood at side of bed. Pt taken off O2 during day as his SpO2 remained in high 90s. Pt reporting generalized pain 8-9/10. Pt given Tylenol but declined any additional interventions.    NURSING PLAN:  Follow POC.    DISCHARGE DISPOSITION:  TBD    Deborah Anne RN

## 2022-12-12 NOTE — PROGRESS NOTES
.SPIRITUAL HEALTH SERVICES Progress Note  [facility] ([facility]) 5B    Saw pt Dallas CORBIN Sandrine per self-initiated LOS visit.      Illness Narrative - Dallas shared that he had come to the hospital for seizures and has since been dealing with other issues.       Distress - Dallas shared that he has dealt with much difficulty in his life. He is blind, has cancer, and has had much pain. He has been in the hospital for about a week and has been anxious about what lies ahead.       Coping - When asked what has helped him deal with the difficulties he's faced in his life, Dallas replied that his belief in Cain Santosh. He has relied much on his strength and taking everything one day at a time. He is grateful for the care he is receiving from the medical staff and his therapists. He is open to continued visits and support from [facility].       Plan - I will continue to visit and support per LOS and as needed/requested. [facility] remains available for any needs.     Ivania Ventura  Chaplain Resident  Pager [phone]    * [facility] remains available 24/7 for emergent requests/referrals, either by having the switchboard page the on-call  or by entering an ASAP/STAT consult in Epic (this will also page the on-call ). Routine Epic consults receive an initial response within 24 hours.*

## 2022-12-12 NOTE — PROGRESS NOTES
"A&Ox4, calm and cooperative, able to make needs known with call light in reach.  VSS, continent of b&b, uses urinal and commode, LBM 12/12.  Has unchanged baseline n/t in all extremities.  Generalized body weakness.  C/o nausea, given zofran. C/o SOB, oxygen on to keep sats >93% overnight  Abd pain managed with tylenol.  Pt was asked if he felt the current medication he was taking was managing his pain.  Pt stated \"yes it helps, and I want to avoid taking stronger drugs\".  Found new wound on R elbow, picture taken and posted to media, dressing applied, provider notified. Day nurse to ask WOC nurse if pt needs additional wound care.  Continue POC.    "

## 2022-12-12 NOTE — PLAN OF CARE
Pt is alert and oriented x4  Continued generalized body weakness  Numbness and tingling on both extremities  C/o abdominal pain ,prn tylenol given effectively  C/o nausea Prn Zofran given  CT abdomen/pelvis with contrast done  No BM this shift,voids spontaneously   Sp02 maintained at 97% on RA   PLAN: TBD pending placement for TCU

## 2022-12-12 NOTE — PROGRESS NOTES
[facility]  WOC Nurse Inpatient Assessment     Consulted for: left ear    Patient History (according to provider note(s):      Patient is a 61 y/o man who has multiple medical problems including alcohol dependence, alcoholic pancreatitis, chronic pancreatitis, past pancreatic stents, seizure disorder, COPD, hypokalemia and microcytic anemia. Patient presented with concerns about seizure activity. Patient was found to have alcohol withdrawal and alcoholic ketoacidosis.     Areas Assessed:      Areas visualized during today's visit: Face and neck    Pressure Injury Location: left ear      Last photo: 12/8/22  Wound type: Pressure Injury     Pressure Injury Stage: 2, hospital acquired      This is a Medical Device Related Pressure Injury (MDRPI) due to oxygen tubing  Wound history/plan of care:   Noted on skin assessment during skin study. Pt denied pain  12/12: noted O2 tubing is hard tubing and not soft. Switched out tubing.   Wound base: 100 % dermis     Palpation of the wound bed: normal      Drainage: scant     Description of drainage: serous     Measurements (length x width x depth, in cm) 0.2 x 0.1 x <0.1 cm  Periwound skin: Intact      Color: normal and consistent with surrounding tissue      Temperature: normal   Odor: none  Pain: denies , none  Pain intervention prior to dressing change: no significant pain present   Treatment goal: Protection  STATUS: improving  Supplies ordered: discussed with RN and discussed with patient       Treatment Plan:     Left ear wound(s): Daily    Use foam protector on oxygen tubing when in use.      Orders: Written    RECOMMEND PRIMARY TEAM ORDER: None, at this time  Education provided: importance of repositioning, plan of care and wound progress  Discussed plan of care with: Patient and Nurse  WOC nurse follow-up plan: weekly  Notify WOC if wound(s) deteriorate.  Nursing to notify the Provider(s) and re-consult the WOC Nurse if new  skin concern.    DATA:     Current support surface: Standard  Standard gel/foam mattress (IsoFlex, Atmos air, etc)  Containment of urine/stool: Continent of bladder and Continent of bowel  BMI: Body mass index is 23.82 kg/m .   Active diet order: Orders Placed This Encounter      Combination Diet Regular Diet Adult     Output: I/O last 3 completed shifts:  In: -   Out: 1025 [Urine:1025]     Labs:   Recent Labs   Lab 12/11/22  0710 12/09/22  0710 12/07/22  0832 12/06/22  0631   ALBUMIN  --  2.5*   < > 4.2   HGB 8.2* 7.6*   < > 9.4*   WBC 7.4 4.5   < > 6.2   A1C  --   --   --  6.7*    < > = values in this interval not displayed.     Pressure injury risk assessment:   Sensory Perception: 2-->very limited  Moisture: 4-->rarely moist  Activity: 3-->walks occasionally  Mobility: 3-->slightly limited  Nutrition: 3-->adequate  Friction and Shear: 3-->no apparent problem  Kwesi Score: 18    Vidhya Mercado RN CWOCN  Dept. Pager: [phone]  Dept. Office Number: *[phone]

## 2022-12-13 ENCOUNTER — APPOINTMENT (OUTPATIENT)
Dept: OCCUPATIONAL THERAPY | Facility: CLINIC | Age: 62
End: 2022-12-13
Payer: COMMERCIAL

## 2022-12-13 ENCOUNTER — APPOINTMENT (OUTPATIENT)
Dept: PHYSICAL THERAPY | Facility: CLINIC | Age: 62
End: 2022-12-13
Payer: COMMERCIAL

## 2022-12-13 LAB
GLUCOSE BLDC GLUCOMTR-MCNC: 182 MG/DL (ref 70–99)
GLUCOSE BLDC GLUCOMTR-MCNC: 186 MG/DL (ref 70–99)
GLUCOSE BLDC GLUCOMTR-MCNC: 211 MG/DL (ref 70–99)
GLUCOSE BLDC GLUCOMTR-MCNC: 218 MG/DL (ref 70–99)
GLUCOSE BLDC GLUCOMTR-MCNC: 219 MG/DL (ref 70–99)
GLUCOSE BLDC GLUCOMTR-MCNC: 264 MG/DL (ref 70–99)
MAGNESIUM SERPL-MCNC: 1.8 MG/DL (ref 1.6–2.3)
PHOSPHATE SERPL-MCNC: 3.3 MG/DL (ref 2.5–4.5)
POTASSIUM BLD-SCNC: 3.7 MMOL/L (ref 3.4–5.3)

## 2022-12-13 PROCEDURE — 84132 ASSAY OF SERUM POTASSIUM: CPT | Performed by: INTERNAL MEDICINE

## 2022-12-13 PROCEDURE — 97535 SELF CARE MNGMENT TRAINING: CPT | Mod: GO

## 2022-12-13 PROCEDURE — 250N000012 HC RX MED GY IP 250 OP 636 PS 637: Performed by: INTERNAL MEDICINE

## 2022-12-13 PROCEDURE — 250N000013 HC RX MED GY IP 250 OP 250 PS 637: Performed by: INTERNAL MEDICINE

## 2022-12-13 PROCEDURE — 84100 ASSAY OF PHOSPHORUS: CPT | Performed by: INTERNAL MEDICINE

## 2022-12-13 PROCEDURE — 97116 GAIT TRAINING THERAPY: CPT | Mod: GP

## 2022-12-13 PROCEDURE — 120N000002 HC R&B MED SURG/OB UMMC

## 2022-12-13 PROCEDURE — 250N000011 HC RX IP 250 OP 636: Performed by: NURSE PRACTITIONER

## 2022-12-13 PROCEDURE — 97110 THERAPEUTIC EXERCISES: CPT | Mod: GP

## 2022-12-13 PROCEDURE — 36415 COLL VENOUS BLD VENIPUNCTURE: CPT | Performed by: INTERNAL MEDICINE

## 2022-12-13 PROCEDURE — 99232 SBSQ HOSP IP/OBS MODERATE 35: CPT | Performed by: INTERNAL MEDICINE

## 2022-12-13 PROCEDURE — 83735 ASSAY OF MAGNESIUM: CPT | Performed by: INTERNAL MEDICINE

## 2022-12-13 RX ORDER — POTASSIUM CHLORIDE 750 MG/1
20 TABLET, EXTENDED RELEASE ORAL ONCE
Status: COMPLETED | OUTPATIENT
Start: 2022-12-13 | End: 2022-12-13

## 2022-12-13 RX ORDER — MAGNESIUM OXIDE 400 MG/1
400 TABLET ORAL EVERY 4 HOURS
Status: COMPLETED | OUTPATIENT
Start: 2022-12-13 | End: 2022-12-13

## 2022-12-13 RX ADMIN — CLONIDINE HYDROCHLORIDE 0.1 MG: 0.1 TABLET ORAL at 20:11

## 2022-12-13 RX ADMIN — ACETAMINOPHEN 650 MG: 325 TABLET, FILM COATED ORAL at 13:43

## 2022-12-13 RX ADMIN — CLONIDINE HYDROCHLORIDE 0.1 MG: 0.1 TABLET ORAL at 04:27

## 2022-12-13 RX ADMIN — MAGNESIUM OXIDE 400 MG (241.3 MG MAGNESIUM) TABLET 400 MG: TABLET at 11:25

## 2022-12-13 RX ADMIN — ACETAMINOPHEN 650 MG: 325 TABLET, FILM COATED ORAL at 04:27

## 2022-12-13 RX ADMIN — ACETAMINOPHEN 650 MG: 325 TABLET, FILM COATED ORAL at 20:23

## 2022-12-13 RX ADMIN — PANTOPRAZOLE SODIUM 40 MG: 40 TABLET, DELAYED RELEASE ORAL at 17:20

## 2022-12-13 RX ADMIN — INSULIN ASPART 1 UNITS: 100 INJECTION, SOLUTION INTRAVENOUS; SUBCUTANEOUS at 17:20

## 2022-12-13 RX ADMIN — POLYETHYLENE GLYCOL 3350 17 G: 17 POWDER, FOR SOLUTION ORAL at 09:12

## 2022-12-13 RX ADMIN — INSULIN ASPART 0.5 UNITS: 100 INJECTION, SOLUTION INTRAVENOUS; SUBCUTANEOUS at 22:14

## 2022-12-13 RX ADMIN — POTASSIUM CHLORIDE 20 MEQ: 750 TABLET, EXTENDED RELEASE ORAL at 09:11

## 2022-12-13 RX ADMIN — INSULIN ASPART 0.5 UNITS: 100 INJECTION, SOLUTION INTRAVENOUS; SUBCUTANEOUS at 09:26

## 2022-12-13 RX ADMIN — ONDANSETRON 4 MG: 4 TABLET, ORALLY DISINTEGRATING ORAL at 01:26

## 2022-12-13 RX ADMIN — INSULIN ASPART 1 UNITS: 100 INJECTION, SOLUTION INTRAVENOUS; SUBCUTANEOUS at 11:18

## 2022-12-13 RX ADMIN — LEVETIRACETAM 2000 MG: 500 TABLET, FILM COATED ORAL at 20:11

## 2022-12-13 RX ADMIN — FOLIC ACID 1 MG: 1 TABLET ORAL at 09:10

## 2022-12-13 RX ADMIN — LEVETIRACETAM 2000 MG: 500 TABLET, FILM COATED ORAL at 09:09

## 2022-12-13 RX ADMIN — PANCRELIPASE 3 CAPSULE: 60000; 12000; 38000 CAPSULE, DELAYED RELEASE PELLETS ORAL at 09:21

## 2022-12-13 RX ADMIN — CLONIDINE HYDROCHLORIDE 0.1 MG: 0.1 TABLET ORAL at 11:24

## 2022-12-13 RX ADMIN — PANCRELIPASE 3 CAPSULE: 60000; 12000; 38000 CAPSULE, DELAYED RELEASE PELLETS ORAL at 11:24

## 2022-12-13 RX ADMIN — GABAPENTIN 300 MG: 300 CAPSULE ORAL at 13:40

## 2022-12-13 RX ADMIN — PANTOPRAZOLE SODIUM 40 MG: 40 TABLET, DELAYED RELEASE ORAL at 09:10

## 2022-12-13 RX ADMIN — GABAPENTIN 300 MG: 300 CAPSULE ORAL at 09:11

## 2022-12-13 RX ADMIN — GABAPENTIN 300 MG: 300 CAPSULE ORAL at 20:11

## 2022-12-13 RX ADMIN — MAGNESIUM OXIDE 400 MG (241.3 MG MAGNESIUM) TABLET 400 MG: TABLET at 09:15

## 2022-12-13 RX ADMIN — TAMSULOSIN HYDROCHLORIDE 0.4 MG: 0.4 CAPSULE ORAL at 09:09

## 2022-12-13 RX ADMIN — Medication 1 TABLET: at 09:11

## 2022-12-13 RX ADMIN — PANCRELIPASE 3 CAPSULE: 60000; 12000; 38000 CAPSULE, DELAYED RELEASE PELLETS ORAL at 17:21

## 2022-12-13 ASSESSMENT — ACTIVITIES OF DAILY LIVING (ADL)
ADLS_ACUITY_SCORE: 27

## 2022-12-13 NOTE — PROGRESS NOTES
"CLINICAL NUTRITION SERVICES - ASSESSMENT NOTE     Nutrition Prescription    RECOMMENDATIONS FOR MDs/PROVIDERS TO ORDER:  Please order thiamine supplementation in the setting of ETOH use     Malnutrition Status:    Patient does not meet two of the established criteria necessary for diagnosing malnutrition but is at risk for malnutrition    Recommendations already ordered by Registered Dietitian (RD):  [place] Ensure BID at breakfast and dinner meals    Room service assist due to pt having blindness and cannot read menus     Future/Additional Recommendations:  Monitor meal intakes, supplement acceptance, wt/lab trends       REASON FOR ASSESSMENT  Dallas Deluca is a/an 62 year old male assessed by the dietitian for Provider Order - \"Cannot eat\"    NUTRITION/MEDICAL HISTORY  Per chart review: Pt admits to hospital in the setting of now resolved alcohol withdrawal and alcoholic ketoacidosis, with a history of alcohol dependence, alcoholic pancreatitis, chronic pancreatitis, past pancreatic stents, seizure disorder, COPD, hypokalemia and microcytic anemia.     Per pt visit: RD visited pt at bedside this afternoon, pt reports continued decreased appetite in the setting of above, only able to consume maybe half of the meals, also with blindness so unsure of menu options, agreeing to have  read to pt to obtain orders and supplements for additional nutrition.     CURRENT NUTRITION ORDERS  Diet: Regular  Intake/Tolerance: 50% per pt reports - nothing recorded in flow sheets     LABS  Labs reviewed    MEDICATIONS  Keppra, correction scale insulin, Creon, Folvite, Miralax, MVI, Protonix    ANTHROPOMETRICS  Height: 177.8 cm (5' 10\")  Most Recent Weight: 75.3 kg (166 lb 0.1 oz)    IBW: 75.4 kg  BMI: Normal BMI  Weight History:   81.6 kg (180 lb) 11/27/2022 - per CE - unsure of accuracy      71.6 kg (157 lb 14.4 oz) 08/11/2022 - per CE     Weight assessment: Question accuracy of weight  From 11/27 vs 9 lb wt gain " in >3 months.     Dosing Weight: 75.3 kg    ASSESSED NUTRITION NEEDS  Estimated Energy Needs: 9806-9454 kcals/day (25 - 30 kcals/kg)  Justification: Maintenance  Estimated Protein Needs: 75 grams protein/day (1 grams of pro/kg)  Justification: Maintenance  Estimated Fluid Needs: 1 mL/kcal  Justification: Maintenance    PHYSICAL FINDINGS  WOC RN note reviewed - PI from oxygen tubing on left ear     MALNUTRITION  % Intake: Decreased intake does not meet criteria  % Weight Loss: None noted  Subcutaneous Fat Loss: None observed  Muscle Loss: Temporal: mild   Fluid Accumulation/Edema: None noted  Malnutrition Diagnosis: Patient does not meet two of the established criteria necessary for diagnosing malnutrition but is at risk for malnutrition    NUTRITION DIAGNOSIS  Predicted inadequate nutrient intake related to appetite vs substance (ETOH) abuse     INTERVENTIONS  Implementation  Nutrition Education: RD reviewed the importance of adequate nutritional intakes for improved health status   Collaboration with other staff -  assist as ordered above   Medical food supplement therapy - ordered as above     Goals  Patient to consume % of nutritionally adequate meal trays TID, or the equivalent with supplements/snacks.     Monitoring/Evaluation  Progress toward goals will be monitored and evaluated per protocol.    Peggy Kaufman RD, CNSC, LD  5B - [facility] RD pager: [phone]
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
## 2022-12-13 NOTE — PLAN OF CARE
"   BP (P) 135/79 (BP Location: Left arm)   Pulse (P) 71   Temp (P) 98.8  F (37.1  C) (Oral)   Resp (P) 20   Ht 1.778 m (5' 10\")   Wt 75.3 kg (166 lb 0.1 oz)   SpO2 97%   BMI 23.82 kg/m        Pulmonary: Reports SOB on exertion.     Output: Voiding spontaneously and without difficulty, using urinal.    Activity. Has not been OOB during this shift     Skin: Right elbow pressure ulcer. Bruising on bilateral elbows    Pain: C/O 7/10 pain, headache and abdominal discomfort. PRN tylenol given    Neuro/CMS: A&O X 3    Dressing: Right elbow wound dressing    IV/Drains: none    Plan: Awaiting TCU placement    Mag/phos/potassium draws this morning    "

## 2022-12-13 NOTE — PLAN OF CARE
End of shift Summary: See flowsheet for VS and detail assessments.    Changes this Shift:     Pulmonary: Lung sounds clear, dyspnea upon exertion, denies SOB at baseline. Remains on RA.    Output: Voids spontaneously without difficulty, LBM 12/12    Activity: Up Ax1 w/ walker and gait belt    Skin: L ear wound and R elbow wound    Pain: States baseline pain is 8/10 but declines pain medications other than Tylenol    Neuro/CMS: A&Ox4, endorses n/t in all extremeties    Dressing: Elbow dressing C/D/I.    IV/Drains: R PIV saline locked and patent.    Plan: Awaiting TCU placement

## 2022-12-13 NOTE — PLAN OF CARE
Goal Outcome Evaluation:    Patient is alert and oriented x 4. VSS. Neuros WNL. C/O pain to neck, abdomen and legs at the level of 8/10 and received Tylenol which was effective in pain management. IV line on right forearm is patent, flushed to keep open. Wound to right elbow had a little scanty drainage, with surrounding area of approximately a quarter size swollen but non-tender to touch. Area cleaned and dressing changed. Assist of 1 with transfers but independent with bed mobility. Ate 100% of both breakfast and lunch served with adequate fluid intake. Had large soft BM. Staff will continue to monitor.

## 2022-12-13 NOTE — PROGRESS NOTES
[facility]    Medicine Progress Note - Hospitalist Service, GOLD TEAM 18    Date of Admission:  12/6/2022    Assessment & Plan   Patient is a 61 y/o man who has multiple medical problems including alcohol dependence, alcoholic pancreatitis, chronic pancreatitis, past pancreatic stents, seizure disorder, COPD, hypokalemia and microcytic anemia. Patient presented with concerns about seizure activity. Patient was found to have alcohol withdrawal and alcoholic ketoacidosis, both of which appear resolved.     #Alcohol dependence with alcohol withdrawal; withdrawal appears resolve  -- Monitoring for changes.    #Alcoholic ketoacidosis, appears resolved  -- No active intervention indicated at present    #Esophagitis  -- Protonix 40 mg PO BID    #Hypoxemia of unclear cause, patient now on room air  -- Monitoring for additional symptoms  -- Monitoring respiratory status.    #Seizure disorder  -- Patient on seizure precautions for now.  -- Patient back on Keppra.    #Generalized weakness  -- PT/OT    #Peripheral neuropathy  -- Patient back on gabapentin.    #Chronic pancreatitis  -- Patient on Creon.     #Microcytic anemia; iron deficiency anemia  -- Anticipate that patient will eventually be on supplemental iron as outpatient   Patient would benefit from colonoscopy as outpatient.    #Peripheral edema  -- Monitoring for additional symptoms.    #Covid-19 exposure; PCR was negative  -- Monitoring for symptoms  -- If patient has symptoms suggestive of Covid-19 infection, PCR will be repeated.    #Left lower lobe nodule  -- CT chest in 3 months as outpatient.    #COPD  -- Patient compensated  -- Monitoring for changes.    #Blindness  -- Patient has history of left eye enucleation and patient has poor vision in right eye  -- Monitoring for safety.    #T2DM; patient listed as being on metformin as outpatient  -- Metformin on hold for now.  -- Patient on insulin sliding  "scale.    #Hypokalemia, hypomagnesemia, hypophosphatemia  -- Supplementing as needed.    #Dysuria - patient had negative urinalysis and did not appear to have urinary retention  -- Monitoring for additional symptoms.    #Constipation  -- Bowel regimen.    #Generalized abdominal discomfort of unclear cause  -- Acetaminophen as needed. Monitoring for additional symptoms.    #Left ear stage 2 pressure injury, hospital acquired  -- Wound care.       Diet: Combination Diet Regular Diet Adult    DVT Prophylaxis: Pneumatic Compression Devices  Trejo Catheter: Not present  Central Lines: None  Cardiac Monitoring: None  Code Status: Full Code      Disposition Plan     Expected Discharge Date: 12/13/2022      Destination: nursing home  Discharge Comments: pending TCU placement        The patient's care was discussed with the Bedside Nurse and Patient.    Torrey Mares DO  Hospitalist Service, GOLD TEAM [address] [place] [address]  Securely message with the Vocera Web Console (learn more here)  Text page via [employer] Paging/Directory   Please see signed in provider for up to date coverage information      Clinically Significant Risk Factors              # Hypoalbuminemia: Lowest albumin = 2.5 g/dL at 12/9/2022  7:10 AM, will monitor as appropriate   # Thrombocytopenia: Lowest platelets = 127 in last 2 days, will monitor for bleeding        # DMII: A1C = 6.7 % (Ref range: <5.7 %) within past 3 months           ______________________________________________________________________    Interval History   Patient is feeling improved today.  Patient is apprehensive regarding discharge.  Patient reports he is \"weak.\"  Patient reports he \"cannot eat.\"  Case management coordinating discharge planning.    Data reviewed today: I reviewed all medications, new labs and imaging results over the last 24 hours. I personally reviewed no images or EKG's today.    Physical Exam   Vital Signs: Temp: 98.7  F (37.1  C) " Temp src: Axillary BP: 109/64 Pulse: 72   Resp: 18 SpO2: 98 % O2 Device: None (Room air)    Weight: 166 lbs .1 oz     GENERAL: Alert and oriented x 3; no acute distress; well-nourished.  HEENT: Normocephalic; atraumatic; PERRLA; MMM.  CV: RRR; normal S1, S2; no rubs, murmurs, or gallops.  RESP: Lung fields clear to aucultation B/L; no wheezing or crepitations.  GI: Abdomen is soft, nontender, nondistended; no organomegaly; normal bowel sounds.  : Deferred genital examination.   MSK: No clubbing, cyanosis, or edema.  DERM: Skin is intact; no rash, lesions, or skin breakdown.  NEURO: No focal deficits appreciated; strength & sensorium are grossly intact.  PSYCH: No active hallucinations; affect, insight appear within normal limits.    Data   Recent Labs   Lab 12/13/22  1044 12/13/22  0855 12/13/22  0629 12/13/22  0611 12/12/22  0814 12/12/22  0724 12/11/22  0855 12/11/22  0710 12/10/22  0634 12/10/22  0628 12/09/22  1154 12/09/22  0710 12/07/22  1251 12/07/22  0832   WBC  --   --   --   --   --   --   --  7.4  --   --   --  4.5  --  3.2*   HGB  --   --   --   --   --   --   --  8.2*  --   --   --  7.6*  --  7.5*   MCV  --   --   --   --   --   --   --  72*  --   --   --  72*  --  70*   PLT  --   --   --   --   --  127*  --  114*  --   --   --  94*  --  125*   NA  --   --   --   --   --   --   --  133  --   --   --  138  --  136   POTASSIUM  --   --   --  3.7  --  4.2  --  4.0  --  3.8  --  3.7   < > 3.5  3.3*   CHLORIDE  --   --   --   --   --   --   --  104  --   --   --  103  --  102   CO2  --   --   --   --   --   --   --  23  --   --   --  30  --  28   BUN  --   --   --   --   --   --   --  9  --   --   --  7  --  6*   CR  --   --   --   --   --  0.56*  --  0.51*  --   --   --  0.52*  --  0.50*   ANIONGAP  --   --   --   --   --   --   --  6  --   --   --  5  --  6   RASHMI  --   --   --   --   --   --   --  8.9  --   --   --  8.6  --  8.0*   * 182* 186*  --    < >  --    < > 175*   < >  --    < > 178*   <  > 133*   ALBUMIN  --   --   --   --   --   --   --   --   --   --   --  2.5*  --  2.6*   PROTTOTAL  --   --   --   --   --   --   --   --   --   --   --  6.7*  --  6.7*   BILITOTAL  --   --   --   --   --   --   --   --   --   --   --  0.7  --  1.3   ALKPHOS  --   --   --   --   --   --   --   --   --   --   --  84  --  78   ALT  --   --   --   --   --   --   --   --   --   --   --  34  --  41   AST  --   --   --   --   --   --   --   --   --   --   --  57*  --  91*   LIPASE  --   --   --   --   --   --   --   --   --  106  --   --   --   --     < > = values in this interval not displayed.     No results found for this or any previous visit (from the past 24 hour(s)).  Medications     Injection Device for insulin         amylase-lipase-protease  3 capsule Oral TID w/meals     cloNIDine  0.1 mg Oral Q8H     [Held by provider] enoxaparin ANTICOAGULANT  40 mg Subcutaneous Q24H     folic acid  1 mg Oral Daily     gabapentin  300 mg Oral TID     insulin aspart  0.5-2.5 Units Subcutaneous TID AC     insulin aspart  0.5-2.5 Units Subcutaneous At Bedtime     levETIRAcetam  2,000 mg Oral BID     multivitamin w/minerals  1 tablet Oral Daily     pantoprazole  40 mg Oral BID AC     polyethylene glycol  17 g Oral Daily     sodium chloride (PF)  3 mL Intracatheter Q8H     tamsulosin  0.4 mg Oral Daily

## 2022-12-14 ENCOUNTER — APPOINTMENT (OUTPATIENT)
Dept: PHYSICAL THERAPY | Facility: CLINIC | Age: 62
End: 2022-12-14
Payer: COMMERCIAL

## 2022-12-14 ENCOUNTER — APPOINTMENT (OUTPATIENT)
Dept: OCCUPATIONAL THERAPY | Facility: CLINIC | Age: 62
End: 2022-12-14
Payer: COMMERCIAL

## 2022-12-14 LAB
GLUCOSE BLDC GLUCOMTR-MCNC: 243 MG/DL (ref 70–99)
GLUCOSE BLDC GLUCOMTR-MCNC: 282 MG/DL (ref 70–99)
GLUCOSE BLDC GLUCOMTR-MCNC: 285 MG/DL (ref 70–99)
GLUCOSE BLDC GLUCOMTR-MCNC: 308 MG/DL (ref 70–99)
GLUCOSE BLDC GLUCOMTR-MCNC: 342 MG/DL (ref 70–99)
GLUCOSE BLDC GLUCOMTR-MCNC: 368 MG/DL (ref 70–99)
MAGNESIUM SERPL-MCNC: 1.6 MG/DL (ref 1.6–2.3)
MAGNESIUM SERPL-MCNC: 1.8 MG/DL (ref 1.6–2.3)
PHOSPHATE SERPL-MCNC: 2.4 MG/DL (ref 2.5–4.5)
PHOSPHATE SERPL-MCNC: 2.7 MG/DL (ref 2.5–4.5)
POTASSIUM BLD-SCNC: 4 MMOL/L (ref 3.4–5.3)
POTASSIUM BLD-SCNC: 4.2 MMOL/L (ref 3.4–5.3)

## 2022-12-14 PROCEDURE — 250N000013 HC RX MED GY IP 250 OP 250 PS 637: Performed by: INTERNAL MEDICINE

## 2022-12-14 PROCEDURE — 36415 COLL VENOUS BLD VENIPUNCTURE: CPT | Performed by: PHYSICIAN ASSISTANT

## 2022-12-14 PROCEDURE — 84132 ASSAY OF SERUM POTASSIUM: CPT | Performed by: PHYSICIAN ASSISTANT

## 2022-12-14 PROCEDURE — 97535 SELF CARE MNGMENT TRAINING: CPT | Mod: GO | Performed by: OCCUPATIONAL THERAPIST

## 2022-12-14 PROCEDURE — 83735 ASSAY OF MAGNESIUM: CPT | Performed by: INTERNAL MEDICINE

## 2022-12-14 PROCEDURE — 83735 ASSAY OF MAGNESIUM: CPT | Performed by: PHYSICIAN ASSISTANT

## 2022-12-14 PROCEDURE — 84132 ASSAY OF SERUM POTASSIUM: CPT | Performed by: INTERNAL MEDICINE

## 2022-12-14 PROCEDURE — 97530 THERAPEUTIC ACTIVITIES: CPT | Mod: GO | Performed by: OCCUPATIONAL THERAPIST

## 2022-12-14 PROCEDURE — 97110 THERAPEUTIC EXERCISES: CPT | Mod: GP | Performed by: PHYSICAL THERAPIST

## 2022-12-14 PROCEDURE — 120N000002 HC R&B MED SURG/OB UMMC

## 2022-12-14 PROCEDURE — 99232 SBSQ HOSP IP/OBS MODERATE 35: CPT | Performed by: INTERNAL MEDICINE

## 2022-12-14 PROCEDURE — 84100 ASSAY OF PHOSPHORUS: CPT | Performed by: PHYSICIAN ASSISTANT

## 2022-12-14 PROCEDURE — 84100 ASSAY OF PHOSPHORUS: CPT | Performed by: INTERNAL MEDICINE

## 2022-12-14 PROCEDURE — 97116 GAIT TRAINING THERAPY: CPT | Mod: GP | Performed by: PHYSICAL THERAPIST

## 2022-12-14 PROCEDURE — 36415 COLL VENOUS BLD VENIPUNCTURE: CPT | Performed by: INTERNAL MEDICINE

## 2022-12-14 RX ADMIN — CLONIDINE HYDROCHLORIDE 0.1 MG: 0.1 TABLET ORAL at 20:35

## 2022-12-14 RX ADMIN — LEVETIRACETAM 2000 MG: 500 TABLET, FILM COATED ORAL at 08:32

## 2022-12-14 RX ADMIN — INSULIN ASPART 1.5 UNITS: 100 INJECTION, SOLUTION INTRAVENOUS; SUBCUTANEOUS at 16:33

## 2022-12-14 RX ADMIN — PANCRELIPASE 3 CAPSULE: 60000; 12000; 38000 CAPSULE, DELAYED RELEASE PELLETS ORAL at 13:15

## 2022-12-14 RX ADMIN — GABAPENTIN 300 MG: 300 CAPSULE ORAL at 08:34

## 2022-12-14 RX ADMIN — CLONIDINE HYDROCHLORIDE 0.1 MG: 0.1 TABLET ORAL at 05:06

## 2022-12-14 RX ADMIN — PANTOPRAZOLE SODIUM 40 MG: 40 TABLET, DELAYED RELEASE ORAL at 16:33

## 2022-12-14 RX ADMIN — INSULIN ASPART 2 UNITS: 100 INJECTION, SOLUTION INTRAVENOUS; SUBCUTANEOUS at 22:27

## 2022-12-14 RX ADMIN — POLYETHYLENE GLYCOL 3350 17 G: 17 POWDER, FOR SOLUTION ORAL at 08:40

## 2022-12-14 RX ADMIN — Medication 1 TABLET: at 08:35

## 2022-12-14 RX ADMIN — PANCRELIPASE 3 CAPSULE: 60000; 12000; 38000 CAPSULE, DELAYED RELEASE PELLETS ORAL at 16:41

## 2022-12-14 RX ADMIN — PANCRELIPASE 3 CAPSULE: 60000; 12000; 38000 CAPSULE, DELAYED RELEASE PELLETS ORAL at 08:28

## 2022-12-14 RX ADMIN — GABAPENTIN 300 MG: 300 CAPSULE ORAL at 13:16

## 2022-12-14 RX ADMIN — LEVETIRACETAM 2000 MG: 500 TABLET, FILM COATED ORAL at 20:35

## 2022-12-14 RX ADMIN — TAMSULOSIN HYDROCHLORIDE 0.4 MG: 0.4 CAPSULE ORAL at 08:34

## 2022-12-14 RX ADMIN — ACETAMINOPHEN 650 MG: 325 TABLET, FILM COATED ORAL at 22:29

## 2022-12-14 RX ADMIN — INSULIN ASPART 1 UNITS: 100 INJECTION, SOLUTION INTRAVENOUS; SUBCUTANEOUS at 08:44

## 2022-12-14 RX ADMIN — PANTOPRAZOLE SODIUM 40 MG: 40 TABLET, DELAYED RELEASE ORAL at 08:35

## 2022-12-14 RX ADMIN — GABAPENTIN 300 MG: 300 CAPSULE ORAL at 20:35

## 2022-12-14 RX ADMIN — FOLIC ACID 1 MG: 1 TABLET ORAL at 08:32

## 2022-12-14 RX ADMIN — INSULIN ASPART 1.5 UNITS: 100 INJECTION, SOLUTION INTRAVENOUS; SUBCUTANEOUS at 13:20

## 2022-12-14 RX ADMIN — CLONIDINE HYDROCHLORIDE 0.1 MG: 0.1 TABLET ORAL at 13:16

## 2022-12-14 RX ADMIN — ACETAMINOPHEN 650 MG: 325 TABLET, FILM COATED ORAL at 08:39

## 2022-12-14 RX ADMIN — ACETAMINOPHEN 650 MG: 325 TABLET, FILM COATED ORAL at 16:38

## 2022-12-14 ASSESSMENT — ACTIVITIES OF DAILY LIVING (ADL)
ADLS_ACUITY_SCORE: 27

## 2022-12-14 NOTE — PROGRESS NOTES
[facility] [place]    Medicine Progress Note - Hospitalist Service, GOLD TEAM 18    Date of Admission:  12/6/2022    Assessment & Plan   Patient is a 61 y/o man who has multiple medical problems including alcohol dependence, alcoholic pancreatitis, chronic pancreatitis, past pancreatic stents, seizure disorder, COPD, hypokalemia and microcytic anemia. Patient presented with concerns about seizure activity. Patient was found to have alcohol withdrawal and alcoholic ketoacidosis, both of which appear resolved.     #Alcohol dependence with alcohol withdrawal; withdrawal appears resolve  -- Monitoring for changes.    #Alcoholic ketoacidosis, appears resolved  -- No active intervention indicated at present    #Esophagitis  -- Protonix 40 mg PO BID    #Hypoxemia of unclear cause, patient now on room air  -- Monitoring for additional symptoms  -- Monitoring respiratory status.    #Seizure disorder  -- Patient on seizure precautions for now.  -- Patient back on Keppra.    #Generalized weakness  -- PT/OT    #Peripheral neuropathy  -- Patient back on gabapentin.    #Chronic pancreatitis  -- Patient on Creon.     #Microcytic anemia; iron deficiency anemia  -- Anticipate that patient will eventually be on supplemental iron as outpatient   Patient would benefit from colonoscopy as outpatient.    #Peripheral edema  -- Monitoring for additional symptoms.    #Covid-19 exposure; PCR was negative  -- Monitoring for symptoms  -- If patient has symptoms suggestive of Covid-19 infection, PCR will be repeated.    #Left lower lobe nodule  -- CT chest in 3 months as outpatient.    #COPD  -- Patient compensated  -- Monitoring for changes.    #Blindness  -- Patient has history of left eye enucleation and patient has poor vision in right eye  -- Monitoring for safety.    #T2DM; patient listed as being on metformin as outpatient  -- Metformin on hold for now.  -- Patient on insulin sliding  scale.    #Hypokalemia, hypomagnesemia, hypophosphatemia  -- Supplementing as needed.    #Dysuria - patient had negative urinalysis and did not appear to have urinary retention  -- Monitoring for additional symptoms.    #Constipation  -- Bowel regimen.    #Generalized abdominal discomfort of unclear cause  -- Acetaminophen as needed. Monitoring for additional symptoms.    #Left ear stage 2 pressure injury, hospital acquired  -- Wound care.       Diet: Combination Diet Regular Diet Adult  Snacks/Supplements Adult: Ensure Enlive; With Meals  Room Service    DVT Prophylaxis: Pneumatic Compression Devices  Trejo Catheter: Not present  Central Lines: None  Cardiac Monitoring: None  Code Status: Full Code      Disposition Plan   { TIP  Expected discharge date has passed or is blank! Click here to update expected discharge date and refresh note (tipsheet)     :12219} Patient pending placement.     The patient's care was discussed with the Bedside Nurse, Care Coordinator/ and Patient.    Torrey Mares,   Hospitalist Service, GOLD TEAM 18  [facility]  Securely message with the Vocera Web Console (learn more here)  Text page via [facility] Paging/Directory   Please see signed in provider for up to date coverage information      Clinically Significant Risk Factors              # Hypoalbuminemia: Lowest albumin = 2.5 g/dL at 12/9/2022  7:10 AM, will monitor as appropriate          # DMII: A1C = 6.7 % (Ref range: <5.7 %) within past 3 months           ______________________________________________________________________    Interval History   Patient reports feeling in great spirits.  Patient appears to be eating well.  Patient denies chest pain, SOB.  Patient denies abdominal pain, nausea, vomiting.  Patient is pending placement.    Data reviewed today: I reviewed all medications, new labs and imaging results over the last 24 hours. I personally reviewed no images or EKG's  today.    Physical Exam   Vital Signs: Temp: 97.7  F (36.5  C) Temp src: Axillary BP: 122/60 Pulse: 73   Resp: 18 SpO2: 95 % O2 Device: None (Room air)    Weight: 168 lbs 14.4 oz    GENERAL: Alert and oriented x 3; no acute distress; well-nourished.  HEENT: Normocephalic; atraumatic; PERRLA; MMM.  CV: RRR; normal S1, S2; no rubs, murmurs, or gallops.  RESP: Lung fields clear to aucultation B/L; no wheezing or crepitations.  GI: Abdomen is soft, nontender, nondistended; no organomegaly; normal bowel sounds.  : Deferred genital examination.   MSK: No clubbing, cyanosis, or edema.  DERM: Skin is intact; no rash, lesions, or skin breakdown.  NEURO: No focal deficits appreciated; strength & sensorium are grossly intact.  PSYCH: No active hallucinations; affect, insight appear within normal limits.    Data   Recent Labs   Lab 12/14/22  1217 12/14/22  0827 12/14/22  0648 12/14/22  0537 12/13/22  0629 12/13/22  0611 12/12/22  0814 12/12/22  0724 12/11/22  0855 12/11/22  0710 12/10/22  0634 12/10/22  0628 12/09/22  1154 12/09/22  0710   WBC  --   --   --   --   --   --   --   --   --  7.4  --   --   --  4.5   HGB  --   --   --   --   --   --   --   --   --  8.2*  --   --   --  7.6*   MCV  --   --   --   --   --   --   --   --   --  72*  --   --   --  72*   PLT  --   --   --   --   --   --   --  127*  --  114*  --   --   --  94*   NA  --   --   --   --   --   --   --   --   --  133  --   --   --  138   POTASSIUM  --   --  4.0  --   --  3.7  --  4.2  --  4.0  --  3.8  --  3.7   CHLORIDE  --   --   --   --   --   --   --   --   --  104  --   --   --  103   CO2  --   --   --   --   --   --   --   --   --  23  --   --   --  30   BUN  --   --   --   --   --   --   --   --   --  9  --   --   --  7   CR  --   --   --   --   --   --   --  0.56*  --  0.51*  --   --   --  0.52*   ANIONGAP  --   --   --   --   --   --   --   --   --  6  --   --   --  5   RASHMI  --   --   --   --   --   --   --   --   --  8.9  --   --   --  8.6   GLC  308* 282*  --  243*   < >  --    < >  --    < > 175*   < >  --    < > 178*   ALBUMIN  --   --   --   --   --   --   --   --   --   --   --   --   --  2.5*   PROTTOTAL  --   --   --   --   --   --   --   --   --   --   --   --   --  6.7*   BILITOTAL  --   --   --   --   --   --   --   --   --   --   --   --   --  0.7   ALKPHOS  --   --   --   --   --   --   --   --   --   --   --   --   --  84   ALT  --   --   --   --   --   --   --   --   --   --   --   --   --  34   AST  --   --   --   --   --   --   --   --   --   --   --   --   --  57*   LIPASE  --   --   --   --   --   --   --   --   --   --   --  106  --   --     < > = values in this interval not displayed.     No results found for this or any previous visit (from the past 24 hour(s)).  Medications     Injection Device for insulin         amylase-lipase-protease  3 capsule Oral TID w/meals     cloNIDine  0.1 mg Oral Q8H     folic acid  1 mg Oral Daily     gabapentin  300 mg Oral TID     insulin aspart  0.5-2.5 Units Subcutaneous TID AC     insulin aspart  0.5-2.5 Units Subcutaneous At Bedtime     levETIRAcetam  2,000 mg Oral BID     multivitamin w/minerals  1 tablet Oral Daily     pantoprazole  40 mg Oral BID AC     polyethylene glycol  17 g Oral Daily     sodium chloride (PF)  3 mL Intracatheter Q8H     tamsulosin  0.4 mg Oral Daily

## 2022-12-14 NOTE — PLAN OF CARE
Pt ordered meal tray with lots of variety of foods on them. Asked patient if he ate 100 % of meal tray for breakfast and lunch. Pt became very angry and stated I left a little of my omelette and breakfast potatoes on breakfast tray/ and I drank two cartons of chocolate milk. For lunch he stated he left some of his mashed potatoes behind. Drank 1 carton of chocolate milk. Denies nausea. Plan to continue to monitor I+0.

## 2022-12-14 NOTE — PLAN OF CARE
Goal Outcome Evaluation:      Patient is alert and oriented x 4. VSS. Neuros WNL. C/O pain to neck, abdomen and legs at the level of 8/10 and received Tylenol which was effective with the pain management. Calm and cooperative with cares. IV line on right forearm is patent, flush to keep open. Ambulated 150 feet with PT and tolerated it well. Independent with bed mobility. Ate 75% of  both breakfast and lunch served with adequate fluid intake. Staff will continue to monitor.

## 2022-12-14 NOTE — PROVIDER NOTIFICATION
Pt , asymptomatic, sliding scale insulin given. Cross-coverage MD notified, no action advised.   
[Routine Follow-Up] : a routine follow-up visit for

## 2022-12-14 NOTE — PLAN OF CARE
Pt A/Ox4. Poor vision (baseline per pt). Reporting generalized pain, PRN Tylenol given.     BG check 341, sliding scale insulin given. Pt good appetite.     R elbow dressing CDI. Pt w/some generalized scabbing.     Voiding in bedside urinal.     LBM 12/13 per pt.     Continue POC.

## 2022-12-14 NOTE — CARE PLAN
VS:    O2: Sating >90% on RA. Lung sounds clear. Denies chest pain and SOB.   Output: Voids spontaneously and adequately.    Last BM:  Bowel sounds active x4. Passing flatus.    Activity: Up with1 assist, FWW, and gait belt. Did not get out of bed   Skin: R elbow wound   Pain: Pain was managed with Tylenol   CMS: A&Ox4. Denies N/T.    Dressing: Dressing to R elbow C/D/I.   Diet: Regular. Appetite was good. Denies N/V.    LDA: PIV to R is S/L.    Equipment: IV pole, FWW, gait belt, and personal belongings. Call light within reach and uses appropriately.   Plan:     Additional Info: Eating snacks over night and requested apple juice 2 cups, was pleasant.

## 2022-12-15 ENCOUNTER — APPOINTMENT (OUTPATIENT)
Dept: PHYSICAL THERAPY | Facility: CLINIC | Age: 62
End: 2022-12-15
Payer: COMMERCIAL

## 2022-12-15 LAB
CREAT SERPL-MCNC: 0.51 MG/DL (ref 0.66–1.25)
GFR SERPL CREATININE-BSD FRML MDRD: >90 ML/MIN/1.73M2
GLUCOSE BLDC GLUCOMTR-MCNC: 174 MG/DL (ref 70–99)
GLUCOSE BLDC GLUCOMTR-MCNC: 223 MG/DL (ref 70–99)
GLUCOSE BLDC GLUCOMTR-MCNC: 279 MG/DL (ref 70–99)
GLUCOSE BLDC GLUCOMTR-MCNC: 288 MG/DL (ref 70–99)
GLUCOSE BLDC GLUCOMTR-MCNC: 306 MG/DL (ref 70–99)
GLUCOSE BLDC GLUCOMTR-MCNC: 333 MG/DL (ref 70–99)
MAGNESIUM SERPL-MCNC: 1.9 MG/DL (ref 1.6–2.3)
PHOSPHATE SERPL-MCNC: 3.8 MG/DL (ref 2.5–4.5)
PLATELET # BLD AUTO: 213 10E3/UL (ref 150–450)
POTASSIUM BLD-SCNC: 4 MMOL/L (ref 3.4–5.3)

## 2022-12-15 PROCEDURE — 97116 GAIT TRAINING THERAPY: CPT | Mod: GP

## 2022-12-15 PROCEDURE — 250N000013 HC RX MED GY IP 250 OP 250 PS 637: Performed by: INTERNAL MEDICINE

## 2022-12-15 PROCEDURE — 85049 AUTOMATED PLATELET COUNT: CPT | Performed by: INTERNAL MEDICINE

## 2022-12-15 PROCEDURE — 36415 COLL VENOUS BLD VENIPUNCTURE: CPT | Performed by: INTERNAL MEDICINE

## 2022-12-15 PROCEDURE — 82565 ASSAY OF CREATININE: CPT | Performed by: INTERNAL MEDICINE

## 2022-12-15 PROCEDURE — 84132 ASSAY OF SERUM POTASSIUM: CPT | Performed by: PHYSICIAN ASSISTANT

## 2022-12-15 PROCEDURE — 99232 SBSQ HOSP IP/OBS MODERATE 35: CPT | Performed by: INTERNAL MEDICINE

## 2022-12-15 PROCEDURE — 83735 ASSAY OF MAGNESIUM: CPT | Performed by: PHYSICIAN ASSISTANT

## 2022-12-15 PROCEDURE — 84100 ASSAY OF PHOSPHORUS: CPT | Performed by: PHYSICIAN ASSISTANT

## 2022-12-15 PROCEDURE — 120N000002 HC R&B MED SURG/OB UMMC

## 2022-12-15 RX ADMIN — GABAPENTIN 300 MG: 300 CAPSULE ORAL at 20:05

## 2022-12-15 RX ADMIN — FOLIC ACID 1 MG: 1 TABLET ORAL at 08:38

## 2022-12-15 RX ADMIN — CLONIDINE HYDROCHLORIDE 0.1 MG: 0.1 TABLET ORAL at 20:05

## 2022-12-15 RX ADMIN — Medication 1 MG: at 20:05

## 2022-12-15 RX ADMIN — ACETAMINOPHEN 650 MG: 325 TABLET, FILM COATED ORAL at 18:41

## 2022-12-15 RX ADMIN — Medication 1 TABLET: at 08:38

## 2022-12-15 RX ADMIN — GABAPENTIN 300 MG: 300 CAPSULE ORAL at 13:35

## 2022-12-15 RX ADMIN — PANCRELIPASE 3 CAPSULE: 60000; 12000; 38000 CAPSULE, DELAYED RELEASE PELLETS ORAL at 12:36

## 2022-12-15 RX ADMIN — POLYETHYLENE GLYCOL 3350 17 G: 17 POWDER, FOR SOLUTION ORAL at 08:38

## 2022-12-15 RX ADMIN — PANCRELIPASE 3 CAPSULE: 60000; 12000; 38000 CAPSULE, DELAYED RELEASE PELLETS ORAL at 19:38

## 2022-12-15 RX ADMIN — INSULIN ASPART 0.5 UNITS: 100 INJECTION, SOLUTION INTRAVENOUS; SUBCUTANEOUS at 08:46

## 2022-12-15 RX ADMIN — PANTOPRAZOLE SODIUM 40 MG: 40 TABLET, DELAYED RELEASE ORAL at 08:38

## 2022-12-15 RX ADMIN — PANTOPRAZOLE SODIUM 40 MG: 40 TABLET, DELAYED RELEASE ORAL at 18:39

## 2022-12-15 RX ADMIN — CLONIDINE HYDROCHLORIDE 0.1 MG: 0.1 TABLET ORAL at 12:36

## 2022-12-15 RX ADMIN — TAMSULOSIN HYDROCHLORIDE 0.4 MG: 0.4 CAPSULE ORAL at 08:38

## 2022-12-15 RX ADMIN — ACETAMINOPHEN 650 MG: 325 TABLET, FILM COATED ORAL at 04:53

## 2022-12-15 RX ADMIN — LEVETIRACETAM 2000 MG: 500 TABLET, FILM COATED ORAL at 20:05

## 2022-12-15 RX ADMIN — INSULIN ASPART 1.5 UNITS: 100 INJECTION, SOLUTION INTRAVENOUS; SUBCUTANEOUS at 12:36

## 2022-12-15 RX ADMIN — INSULIN ASPART 1 UNITS: 100 INJECTION, SOLUTION INTRAVENOUS; SUBCUTANEOUS at 22:45

## 2022-12-15 RX ADMIN — LEVETIRACETAM 2000 MG: 500 TABLET, FILM COATED ORAL at 08:37

## 2022-12-15 RX ADMIN — PANCRELIPASE 3 CAPSULE: 60000; 12000; 38000 CAPSULE, DELAYED RELEASE PELLETS ORAL at 08:45

## 2022-12-15 RX ADMIN — INSULIN ASPART 1.5 UNITS: 100 INJECTION, SOLUTION INTRAVENOUS; SUBCUTANEOUS at 18:43

## 2022-12-15 RX ADMIN — Medication 1 MG: at 01:59

## 2022-12-15 RX ADMIN — GABAPENTIN 300 MG: 300 CAPSULE ORAL at 08:38

## 2022-12-15 ASSESSMENT — ACTIVITIES OF DAILY LIVING (ADL)
ADLS_ACUITY_SCORE: 27
ADLS_ACUITY_SCORE: 25
ADLS_ACUITY_SCORE: 27
ADLS_ACUITY_SCORE: 25
ADLS_ACUITY_SCORE: 25
ADLS_ACUITY_SCORE: 27
ADLS_ACUITY_SCORE: 25

## 2022-12-15 NOTE — PLAN OF CARE
"Goal Outcome Evaluation:       VS:     Pt A/O X 4. Blind in left eye with limited vision in Right. Afebrile. Lungs- clear bilaterally with both anterior and lateral. Denies nausea, dizziness, fever,shortness of breath, chest pain, or newly occurring numbness/tingling. BP 90/62   Pulse 69   Temp 98.4  F (36.9  C) (Oral)   Resp 21   Ht 1.778 m (5' 10\")   Wt 76.6 kg (168 lb 14.4 oz)   SpO2 95%   BMI 24.23 kg/m        Output:     Bowels- active in all four quadrants. Abm is tender. Continent of bladder using urinal.    Activity:     Ind with bed mobility and repositioning. SBA when out of bed per report.      Skin: Several scabbing in multiple areas on L knee and right hand.      Pain:     Pt is receiving tylenol prn q 6 hr with slight relief. On call informed of pt pain and declined further intervention at this time.    CMS:     Numbness  in all extremities at baseline per pt. Cap refill <3 sec, pulsus +2. Strength is week to moderate in all extremities.       Dressing:     Dressing on R elbow CDI.      Diet:     Pt is on a regular diet, thin liquids. However, pt is missing multiple teeth and may benefit from mechanical soft diet.      LDA:     PIV in RUE patent, NS locked.      Equipment:      seizure pads , IV pole.      Plan:     Call light in reach, pt belongings in room, continue to monitor.       Additional Info:       BG at HS was 368 when rechecked at 2 am had reduced to 279. Clonidine was not given at 4 due to soft BPs.         "

## 2022-12-15 NOTE — PROGRESS NOTES
Pt is an assist of 1 with a walker and gait belt.      Loss of vision in the L eye-- R eye vision is poor.      Baseline numbness and tingling in the hands and BLE.     Voids using the urinal at bedside.     Abdominal tenderness-- all quadrants active.      BG checks before meals and at night.      R elbow wound dressing is CDI.  Scabs on the shins, cut on the R fingers.

## 2022-12-15 NOTE — PROGRESS NOTES
[facility]    Medicine Progress Note - Hospitalist Service, GOLD TEAM 18    Date of Admission:  12/6/2022    Assessment & Plan   Patient is a 63 y/o man who has multiple medical problems including alcohol dependence, alcoholic pancreatitis, chronic pancreatitis, past pancreatic stents, seizure disorder, COPD, hypokalemia and microcytic anemia. Patient presented with concerns about seizure activity. Patient was found to have alcohol withdrawal and alcoholic ketoacidosis, both of which appear resolved.     #Alcohol dependence with alcohol withdrawal; withdrawal appears resolve  -- Monitoring for changes.    #Alcoholic ketoacidosis, appears resolved  -- No active intervention indicated at present    #Esophagitis  -- Protonix 40 mg PO BID    #Hypoxemia of unclear cause, patient now on room air  -- Monitoring for additional symptoms  -- Monitoring respiratory status.    #Seizure disorder  -- Patient on seizure precautions for now.  -- Patient back on Keppra.    #Generalized weakness  -- PT/OT    #Peripheral neuropathy  -- Patient back on gabapentin.    #Chronic pancreatitis  -- Patient on Creon.     #Microcytic anemia; iron deficiency anemia  -- Anticipate that patient will eventually be on supplemental iron as outpatient   Patient would benefit from colonoscopy as outpatient.    #Peripheral edema  -- Monitoring for additional symptoms.    #Covid-19 exposure; PCR was negative  -- Monitoring for symptoms  -- If patient has symptoms suggestive of Covid-19 infection, PCR will be repeated.    #Left lower lobe nodule  -- CT chest in 3 months as outpatient.    #COPD  -- Patient compensated  -- Monitoring for changes.    #Blindness  -- Patient has history of left eye enucleation and patient has poor vision in right eye  -- Monitoring for safety.    #T2DM; patient listed as being on metformin as outpatient  -- Metformin on hold for now.  -- Patient on insulin sliding  scale.    #Hypokalemia, hypomagnesemia, hypophosphatemia  -- Supplementing as needed.    #Dysuria - patient had negative urinalysis and did not appear to have urinary retention  -- Monitoring for additional symptoms.    #Constipation  -- Bowel regimen.    #Generalized abdominal discomfort of unclear cause  -- Acetaminophen as needed. Monitoring for additional symptoms.    #Left ear stage 2 pressure injury, hospital acquired  -- Wound care.       Diet: Combination Diet Regular Diet Adult  Snacks/Supplements Adult: Ensure Enlive; With Meals  Room Service    DVT Prophylaxis: Pneumatic Compression Devices  Trejo Catheter: Not present  Central Lines: None  Cardiac Monitoring: None  Code Status: Full Code      Disposition Plan   { TIP  Expected discharge date has passed or is blank! Click here to update expected discharge date and refresh note (tipsheet)     :60088} Patient is pending placement.     The patient's care was discussed with the Bedside Nurse, Care Coordinator/ and Patient.    Torrey Mares, DO  Hospitalist Service, GOLD TEAM 18  [facility]  Securely message with the Vocera Web Console (learn more here)  Text page via [employer] Paging/Directory   Please see signed in provider for up to date coverage information      Clinically Significant Risk Factors            # Hypomagnesemia: Lowest Mg = 1.6 mg/dL in last 2 days, will replace as needed   # Hypoalbuminemia: Lowest albumin = 2.5 g/dL at 12/9/2022  7:10 AM, will monitor as appropriate          # DMII: A1C = 6.7 % (Ref range: <5.7 %) within past 3 months           ______________________________________________________________________    Interval History   Patient reports he had a hard night last night.  Patient feels much improved.  Patient is tolerating solid diet without difficulty.  Patient is pending placement.    Data reviewed today: I reviewed all medications, new labs and imaging results over the last  24 hours. I personally reviewed no images or EKG's today.    Physical Exam   Vital Signs: Temp: 97.8  F (36.6  C) Temp src: Oral BP: 100/55 Pulse: 69   Resp: 20 SpO2: 95 % O2 Device: None (Room air)    Weight: 168 lbs 14.4 oz    GENERAL: Alert and oriented x 3; no acute distress; well-nourished.  HEENT: Normocephalic; atraumatic; PERRLA; MMM.  CV: RRR; normal S1, S2; no rubs, murmurs, or gallops.  RESP: Lung fields clear to aucultation B/L; no wheezing or crepitations.  GI: Abdomen is soft, nontender, nondistended; no organomegaly; normal bowel sounds.  : Deferred genital examination.   MSK: No clubbing, cyanosis, or edema.  DERM: Skin is intact; no rash, lesions, or skin breakdown.  NEURO: No focal deficits appreciated; strength & sensorium are grossly intact.  PSYCH: No active hallucinations; affect, insight appear within normal limits.    Data   Recent Labs   Lab 12/15/22  1233 12/15/22  0856 12/15/22  0822 12/15/22  0628 12/14/22  2204 12/14/22  1920 12/14/22  0827 12/14/22  0648 12/12/22  0814 12/12/22  0724 12/11/22  0855 12/11/22  0710 12/10/22  0634 12/10/22  0628 12/09/22  1154 12/09/22  0710   WBC  --   --   --   --   --   --   --   --   --   --   --  7.4  --   --   --  4.5   HGB  --   --   --   --   --   --   --   --   --   --   --  8.2*  --   --   --  7.6*   MCV  --   --   --   --   --   --   --   --   --   --   --  72*  --   --   --  72*   PLT  --  213  --   --   --   --   --   --   --  127*  --  114*  --   --   --  94*   NA  --   --   --   --   --   --   --   --   --   --   --  133  --   --   --  138   POTASSIUM  --  4.0  --   --   --  4.2  --  4.0   < > 4.2  --  4.0  --  3.8  --  3.7   CHLORIDE  --   --   --   --   --   --   --   --   --   --   --  104  --   --   --  103   CO2  --   --   --   --   --   --   --   --   --   --   --  23  --   --   --  30   BUN  --   --   --   --   --   --   --   --   --   --   --  9  --   --   --  7   CR  --  0.51*  --   --   --   --   --   --   --  0.56*  --  0.51*   --   --   --  0.52*   ANIONGAP  --   --   --   --   --   --   --   --   --   --   --  6  --   --   --  5   RASHMI  --   --   --   --   --   --   --   --   --   --   --  8.9  --   --   --  8.6   *  --  174* 223*   < >  --    < >  --    < >  --    < > 175*   < >  --    < > 178*   ALBUMIN  --   --   --   --   --   --   --   --   --   --   --   --   --   --   --  2.5*   PROTTOTAL  --   --   --   --   --   --   --   --   --   --   --   --   --   --   --  6.7*   BILITOTAL  --   --   --   --   --   --   --   --   --   --   --   --   --   --   --  0.7   ALKPHOS  --   --   --   --   --   --   --   --   --   --   --   --   --   --   --  84   ALT  --   --   --   --   --   --   --   --   --   --   --   --   --   --   --  34   AST  --   --   --   --   --   --   --   --   --   --   --   --   --   --   --  57*   LIPASE  --   --   --   --   --   --   --   --   --   --   --   --   --  106  --   --     < > = values in this interval not displayed.     No results found for this or any previous visit (from the past 24 hour(s)).  Medications     Injection Device for insulin         amylase-lipase-protease  3 capsule Oral TID w/meals     cloNIDine  0.1 mg Oral Q8H     folic acid  1 mg Oral Daily     gabapentin  300 mg Oral TID     insulin aspart  0.5-2.5 Units Subcutaneous TID AC     insulin aspart  0.5-2.5 Units Subcutaneous At Bedtime     levETIRAcetam  2,000 mg Oral BID     multivitamin w/minerals  1 tablet Oral Daily     pantoprazole  40 mg Oral BID AC     polyethylene glycol  17 g Oral Daily     sodium chloride (PF)  3 mL Intracatheter Q8H     tamsulosin  0.4 mg Oral Daily

## 2022-12-16 ENCOUNTER — APPOINTMENT (OUTPATIENT)
Dept: OCCUPATIONAL THERAPY | Facility: CLINIC | Age: 62
End: 2022-12-16
Payer: COMMERCIAL

## 2022-12-16 ENCOUNTER — APPOINTMENT (OUTPATIENT)
Dept: PHYSICAL THERAPY | Facility: CLINIC | Age: 62
End: 2022-12-16
Payer: COMMERCIAL

## 2022-12-16 LAB
ALBUMIN SERPL-MCNC: 2.6 G/DL (ref 3.4–5)
ALP SERPL-CCNC: 78 U/L (ref 40–150)
ALT SERPL W P-5'-P-CCNC: 33 U/L (ref 0–70)
ANION GAP SERPL CALCULATED.3IONS-SCNC: 4 MMOL/L (ref 3–14)
AST SERPL W P-5'-P-CCNC: 46 U/L (ref 0–45)
BASOPHILS # BLD AUTO: 0.1 10E3/UL (ref 0–0.2)
BASOPHILS NFR BLD AUTO: 1 %
BILIRUB SERPL-MCNC: 0.3 MG/DL (ref 0.2–1.3)
BUN SERPL-MCNC: 12 MG/DL (ref 7–30)
CALCIUM SERPL-MCNC: 8.6 MG/DL (ref 8.5–10.1)
CHLORIDE BLD-SCNC: 103 MMOL/L (ref 94–109)
CO2 SERPL-SCNC: 25 MMOL/L (ref 20–32)
CREAT SERPL-MCNC: 0.51 MG/DL (ref 0.66–1.25)
EOSINOPHIL # BLD AUTO: 0.2 10E3/UL (ref 0–0.7)
EOSINOPHIL NFR BLD AUTO: 3 %
ERYTHROCYTE [DISTWIDTH] IN BLOOD BY AUTOMATED COUNT: 21.2 % (ref 10–15)
GFR SERPL CREATININE-BSD FRML MDRD: >90 ML/MIN/1.73M2
GLUCOSE BLD-MCNC: 271 MG/DL (ref 70–99)
GLUCOSE BLDC GLUCOMTR-MCNC: 214 MG/DL (ref 70–99)
GLUCOSE BLDC GLUCOMTR-MCNC: 296 MG/DL (ref 70–99)
GLUCOSE BLDC GLUCOMTR-MCNC: 316 MG/DL (ref 70–99)
GLUCOSE BLDC GLUCOMTR-MCNC: 337 MG/DL (ref 70–99)
GLUCOSE BLDC GLUCOMTR-MCNC: 354 MG/DL (ref 70–99)
HCT VFR BLD AUTO: 25.6 % (ref 40–53)
HGB BLD-MCNC: 8 G/DL (ref 13.3–17.7)
IMM GRANULOCYTES # BLD: 0 10E3/UL
IMM GRANULOCYTES NFR BLD: 1 %
LYMPHOCYTES # BLD AUTO: 1.6 10E3/UL (ref 0.8–5.3)
LYMPHOCYTES NFR BLD AUTO: 27 %
MCH RBC QN AUTO: 22.5 PG (ref 26.5–33)
MCHC RBC AUTO-ENTMCNC: 31.3 G/DL (ref 31.5–36.5)
MCV RBC AUTO: 72 FL (ref 78–100)
MONOCYTES # BLD AUTO: 1.1 10E3/UL (ref 0–1.3)
MONOCYTES NFR BLD AUTO: 19 %
NEUTROPHILS # BLD AUTO: 2.9 10E3/UL (ref 1.6–8.3)
NEUTROPHILS NFR BLD AUTO: 49 %
NRBC # BLD AUTO: 0 10E3/UL
NRBC BLD AUTO-RTO: 0 /100
PLATELET # BLD AUTO: 245 10E3/UL (ref 150–450)
POTASSIUM BLD-SCNC: 3.7 MMOL/L (ref 3.4–5.3)
PROT SERPL-MCNC: 7.6 G/DL (ref 6.8–8.8)
RBC # BLD AUTO: 3.56 10E6/UL (ref 4.4–5.9)
SODIUM SERPL-SCNC: 132 MMOL/L (ref 133–144)
WBC # BLD AUTO: 5.9 10E3/UL (ref 4–11)

## 2022-12-16 PROCEDURE — 250N000013 HC RX MED GY IP 250 OP 250 PS 637: Performed by: INTERNAL MEDICINE

## 2022-12-16 PROCEDURE — 97110 THERAPEUTIC EXERCISES: CPT | Mod: GP | Performed by: REHABILITATION PRACTITIONER

## 2022-12-16 PROCEDURE — 99232 SBSQ HOSP IP/OBS MODERATE 35: CPT | Performed by: INTERNAL MEDICINE

## 2022-12-16 PROCEDURE — 85025 COMPLETE CBC W/AUTO DIFF WBC: CPT | Performed by: INTERNAL MEDICINE

## 2022-12-16 PROCEDURE — 97116 GAIT TRAINING THERAPY: CPT | Mod: GP | Performed by: REHABILITATION PRACTITIONER

## 2022-12-16 PROCEDURE — 36415 COLL VENOUS BLD VENIPUNCTURE: CPT | Performed by: INTERNAL MEDICINE

## 2022-12-16 PROCEDURE — 97535 SELF CARE MNGMENT TRAINING: CPT | Mod: GO

## 2022-12-16 PROCEDURE — 80053 COMPREHEN METABOLIC PANEL: CPT | Performed by: INTERNAL MEDICINE

## 2022-12-16 PROCEDURE — 120N000002 HC R&B MED SURG/OB UMMC

## 2022-12-16 RX ORDER — CLONIDINE HYDROCHLORIDE 0.1 MG/1
0.1 TABLET ORAL 2 TIMES DAILY
Status: DISCONTINUED | OUTPATIENT
Start: 2022-12-17 | End: 2022-12-17

## 2022-12-16 RX ORDER — BISACODYL 5 MG
5 TABLET, DELAYED RELEASE (ENTERIC COATED) ORAL DAILY PRN
Status: DISCONTINUED | OUTPATIENT
Start: 2022-12-16 | End: 2022-12-25 | Stop reason: HOSPADM

## 2022-12-16 RX ORDER — DOCUSATE SODIUM 100 MG/1
100 CAPSULE, LIQUID FILLED ORAL 2 TIMES DAILY
Status: DISCONTINUED | OUTPATIENT
Start: 2022-12-16 | End: 2022-12-25 | Stop reason: HOSPADM

## 2022-12-16 RX ORDER — METFORMIN HCL 500 MG
2000 TABLET, EXTENDED RELEASE 24 HR ORAL
Status: DISCONTINUED | OUTPATIENT
Start: 2022-12-16 | End: 2022-12-25 | Stop reason: HOSPADM

## 2022-12-16 RX ADMIN — CLONIDINE HYDROCHLORIDE 0.1 MG: 0.1 TABLET ORAL at 11:19

## 2022-12-16 RX ADMIN — LEVETIRACETAM 2000 MG: 500 TABLET, FILM COATED ORAL at 09:22

## 2022-12-16 RX ADMIN — PANCRELIPASE 3 CAPSULE: 60000; 12000; 38000 CAPSULE, DELAYED RELEASE PELLETS ORAL at 17:57

## 2022-12-16 RX ADMIN — FOLIC ACID 1 MG: 1 TABLET ORAL at 09:23

## 2022-12-16 RX ADMIN — GABAPENTIN 300 MG: 300 CAPSULE ORAL at 20:45

## 2022-12-16 RX ADMIN — TAMSULOSIN HYDROCHLORIDE 0.4 MG: 0.4 CAPSULE ORAL at 09:22

## 2022-12-16 RX ADMIN — PANCRELIPASE 3 CAPSULE: 60000; 12000; 38000 CAPSULE, DELAYED RELEASE PELLETS ORAL at 11:19

## 2022-12-16 RX ADMIN — PANTOPRAZOLE SODIUM 40 MG: 40 TABLET, DELAYED RELEASE ORAL at 17:13

## 2022-12-16 RX ADMIN — GABAPENTIN 300 MG: 300 CAPSULE ORAL at 14:16

## 2022-12-16 RX ADMIN — INSULIN ASPART 0.5 UNITS: 100 INJECTION, SOLUTION INTRAVENOUS; SUBCUTANEOUS at 09:30

## 2022-12-16 RX ADMIN — PANTOPRAZOLE SODIUM 40 MG: 40 TABLET, DELAYED RELEASE ORAL at 09:22

## 2022-12-16 RX ADMIN — ACETAMINOPHEN 650 MG: 325 TABLET, FILM COATED ORAL at 18:15

## 2022-12-16 RX ADMIN — THIAMINE HCL TAB 100 MG 100 MG: 100 TAB at 14:16

## 2022-12-16 RX ADMIN — INSULIN ASPART 1.5 UNITS: 100 INJECTION, SOLUTION INTRAVENOUS; SUBCUTANEOUS at 17:14

## 2022-12-16 RX ADMIN — METFORMIN HYDROCHLORIDE 2000 MG: 500 TABLET, EXTENDED RELEASE ORAL at 17:13

## 2022-12-16 RX ADMIN — CLONIDINE HYDROCHLORIDE 0.1 MG: 0.1 TABLET ORAL at 03:23

## 2022-12-16 RX ADMIN — ACETAMINOPHEN 650 MG: 325 TABLET, FILM COATED ORAL at 09:25

## 2022-12-16 RX ADMIN — ACETAMINOPHEN 650 MG: 325 TABLET, FILM COATED ORAL at 01:57

## 2022-12-16 RX ADMIN — INSULIN ASPART 1.5 UNITS: 100 INJECTION, SOLUTION INTRAVENOUS; SUBCUTANEOUS at 12:20

## 2022-12-16 RX ADMIN — GABAPENTIN 300 MG: 300 CAPSULE ORAL at 09:22

## 2022-12-16 RX ADMIN — INSULIN ASPART 1 UNITS: 100 INJECTION, SOLUTION INTRAVENOUS; SUBCUTANEOUS at 22:40

## 2022-12-16 RX ADMIN — PANCRELIPASE 3 CAPSULE: 60000; 12000; 38000 CAPSULE, DELAYED RELEASE PELLETS ORAL at 09:26

## 2022-12-16 RX ADMIN — POLYETHYLENE GLYCOL 3350 17 G: 17 POWDER, FOR SOLUTION ORAL at 09:22

## 2022-12-16 RX ADMIN — LEVETIRACETAM 2000 MG: 500 TABLET, FILM COATED ORAL at 20:45

## 2022-12-16 RX ADMIN — Medication 1 TABLET: at 09:22

## 2022-12-16 ASSESSMENT — ACTIVITIES OF DAILY LIVING (ADL)
ADLS_ACUITY_SCORE: 25
ADLS_ACUITY_SCORE: 25
ADLS_ACUITY_SCORE: 26
ADLS_ACUITY_SCORE: 25
ADLS_ACUITY_SCORE: 26
ADLS_ACUITY_SCORE: 25
ADLS_ACUITY_SCORE: 25
ADLS_ACUITY_SCORE: 26
ADLS_ACUITY_SCORE: 26
ADLS_ACUITY_SCORE: 25

## 2022-12-16 NOTE — PROGRESS NOTES
[facility]    Medicine Progress Note - Hospitalist Service, GOLD TEAM 18    Date of Admission:  12/6/2022    Assessment & Plan   Patient is a 63 y/o man who has multiple medical problems including alcohol dependence, alcoholic pancreatitis, chronic pancreatitis, past pancreatic stents, seizure disorder, COPD, hypokalemia and microcytic anemia. Patient presented with concerns about seizure activity. Patient was found to have alcohol withdrawal and alcoholic ketoacidosis, both of which appear resolved.     #Alcohol dependence with alcohol withdrawal; withdrawal appears resolve  -- Monitoring for changes.    #Alcoholic ketoacidosis, appears resolved  -- No active intervention indicated at present    #Esophagitis  -- Protonix 40 mg PO BID    #Hypoxemia of unclear cause, patient now on room air  -- Monitoring for additional symptoms  -- Monitoring respiratory status.    #Seizure disorder  -- Patient on seizure precautions for now.  -- Patient back on Keppra.    #Generalized weakness  -- PT/OT    #Peripheral neuropathy  -- Patient back on gabapentin.    #Chronic pancreatitis  -- Patient on Creon.     #Microcytic anemia; iron deficiency anemia  -- Anticipate that patient will eventually be on supplemental iron as outpatient   Patient would benefit from colonoscopy as outpatient.    #Peripheral edema  -- Monitoring for additional symptoms.    #Covid-19 exposure; PCR was negative  -- Monitoring for symptoms  -- If patient has symptoms suggestive of Covid-19 infection, PCR will be repeated.    #Left lower lobe nodule  -- CT chest in 3 months as outpatient.    #COPD  -- Patient compensated  -- Monitoring for changes.    #Blindness  -- Patient has history of left eye enucleation and patient has poor vision in right eye  -- Monitoring for safety.    #T2DM; patient listed as being on metformin as outpatient  -- Serum glucose suboptimally controlled  -- Restart PTA metformin  --  Patient on insulin sliding scale.    #Hypokalemia, hypomagnesemia, hypophosphatemia  -- Supplementing as needed.    #Dysuria  -- Patient had negative urinalysis and did not appear to have urinary retention  -- Monitoring for additional symptoms.    #Constipation  -- Bowel regimen.    #Generalized abdominal discomfort of unclear cause  -- Acetaminophen as needed. Monitoring for additional symptoms.    #Left ear stage 2 pressure injury, hospital acquired  -- Wound care.       Diet: Combination Diet Regular Diet Adult  Snacks/Supplements Adult: Ensure Enlive; With Meals  Room Service    DVT Prophylaxis: Pneumatic Compression Devices  Trejo Catheter: Not present  Central Lines: None  Cardiac Monitoring: None  Code Status: Full Code      Disposition Plan        Patient pending placement.  Patient is a placement challenge.       The patient's care was discussed with the Bedside Nurse, Care Coordinator/ and Patient.    Torrey Mares DO  Hospitalist Service, GOLD TEAM [address] [place] [address]  Securely message with the Vocera Web Console (learn more here)  Text page via [facility] Paging/Directory   Please see signed in provider for up to date coverage information      Clinically Significant Risk Factors         # Hyponatremia: Lowest Na = 132 mmol/L in last 2 days, will monitor as appropriate    # Hypomagnesemia: Lowest Mg = 1.6 mg/dL in last 2 days, will replace as needed   # Hypoalbuminemia: Lowest albumin = 2.5 g/dL at 12/9/2022  7:10 AM, will monitor as appropriate          # DMII: A1C = 6.7 % (Ref range: <5.7 %) within past 3 months           ______________________________________________________________________    Interval History   Patient is irritated with nursing staff regarding his breakfast selection.  Reinforced with patient that he needs to be respectful to hospital staff.  Patient reports only intermittent lower quadrant abdominal discomfort.  Patient reports no bowel  movements in the last two days.  Patient is pending placement.  Patient is a placement challenge.    Data reviewed today: I reviewed all medications, new labs and imaging results over the last 24 hours. I personally reviewed no images or EKG's today.    Physical Exam   Vital Signs: Temp: 98  F (36.7  C) Temp src: Oral BP: 112/69 Pulse: 66   Resp: 16 SpO2: 97 % O2 Device: None (Room air)    Weight: 168 lbs 14.4 oz    GENERAL: Alert and oriented x 3; no acute distress; well-nourished.  HEENT: Normocephalic; atraumatic; blindness; MMM.  CV: RRR; normal S1, S2; no rubs, murmurs, or gallops.  RESP: Lung fields clear to aucultation B/L; no wheezing or crepitations.  GI: Abdomen is soft, nontender, nondistended; no organomegaly; normal bowel sounds.  : Deferred genital examination.   MSK: No clubbing, cyanosis, or edema.  DERM: Skin is intact; no rash, lesions, or skin breakdown.  NEURO: No focal deficits appreciated; strength & sensorium are grossly intact.  PSYCH: No active hallucinations; affect, insight appear within normal limits.    Data   Recent Labs   Lab 12/16/22  1217 12/16/22  0808 12/16/22  0621 12/15/22  1233 12/15/22  0856 12/14/22  2204 12/14/22  1920 12/12/22  0814 12/12/22  0724 12/11/22  0855 12/11/22  0710 12/10/22  0634 12/10/22  0628   WBC  --   --  5.9  --   --   --   --   --   --   --  7.4  --   --    HGB  --   --  8.0*  --   --   --   --   --   --   --  8.2*  --   --    MCV  --   --  72*  --   --   --   --   --   --   --  72*  --   --    PLT  --   --  245  --  213  --   --   --  127*  --  114*   < >  --    NA  --   --  132*  --   --   --   --   --   --   --  133  --   --    POTASSIUM  --   --  3.7  --  4.0  --  4.2   < > 4.2  --  4.0  --  3.8   CHLORIDE  --   --  103  --   --   --   --   --   --   --  104  --   --    CO2  --   --  25  --   --   --   --   --   --   --  23  --   --    BUN  --   --  12  --   --   --   --   --   --   --  9  --   --    CR  --   --  0.51*  --  0.51*  --   --   --  0.56*   --  0.51*   < >  --    ANIONGAP  --   --  4  --   --   --   --   --   --   --  6  --   --    RASHMI  --   --  8.6  --   --   --   --   --   --   --  8.9  --   --    * 214* 271*   < >  --    < >  --    < >  --    < > 175*   < >  --    ALBUMIN  --   --  2.6*  --   --   --   --   --   --   --   --   --   --    PROTTOTAL  --   --  7.6  --   --   --   --   --   --   --   --   --   --    BILITOTAL  --   --  0.3  --   --   --   --   --   --   --   --   --   --    ALKPHOS  --   --  78  --   --   --   --   --   --   --   --   --   --    ALT  --   --  33  --   --   --   --   --   --   --   --   --   --    AST  --   --  46*  --   --   --   --   --   --   --   --   --   --    LIPASE  --   --   --   --   --   --   --   --   --   --   --   --  106    < > = values in this interval not displayed.     No results found for this or any previous visit (from the past 24 hour(s)).  Medications     Injection Device for insulin         amylase-lipase-protease  3 capsule Oral TID w/meals     cloNIDine  0.1 mg Oral Q8H     docusate sodium  100 mg Oral BID     folic acid  1 mg Oral Daily     gabapentin  300 mg Oral TID     insulin aspart  0.5-2.5 Units Subcutaneous TID AC     insulin aspart  0.5-2.5 Units Subcutaneous At Bedtime     levETIRAcetam  2,000 mg Oral BID     metFORMIN  2,000 mg Oral Daily with supper     multivitamin w/minerals  1 tablet Oral Daily     pantoprazole  40 mg Oral BID AC     polyethylene glycol  17 g Oral Daily     sodium chloride (PF)  3 mL Intracatheter Q8H     tamsulosin  0.4 mg Oral Daily     thiamine  100 mg Oral Daily

## 2022-12-16 NOTE — PLAN OF CARE
A/Ox4. VSS on Room Air. Denied CP, Cough, N/V, Dizziness. Pt c/o headache, SOB, and has baseline numbness in all extremities. Pain treated with tylenol. Regular diet, thin liquids, takes pills whole. Continent of B/B, pt has not had a BM since being admitted but is passing gas. Assist of 1 with walker and gait belt. Skin intact with some bruising and scabs. R PIV SL, patent with flush. Call light within reach, able to make needs known. Appears to be sleeping during rounds. Continue POC with discharge pending TCU placement.    CIWA score of 6 and 3.    BG of 296 at 0200. MD paged. Writer gave 1 Unit of insulin.

## 2022-12-16 NOTE — PROGRESS NOTES
Care Management Follow Up    Length of Stay (days): 10    Expected Discharge Date: TBD     Concerns to be Addressed: discharge planning       Patient plan of care discussed at interdisciplinary rounds: Yes    Anticipated Discharge Disposition: Transitional Care     Anticipated Discharge Services: None    Anticipated Discharge DME: None    Patient/family educated on Medicare website which has current facility and service quality ratings: yes    Education Provided on the Discharge Plan:  Yes    Patient/Family in Agreement with the Plan: yes    Referrals Placed by CM/SW: External Care Coordination    Private pay costs discussed: Not applicable    Additional Information:    Writer followed up on pending referrals below and sent out 8 more referrals with patient permission.    Pending Referrals    [facility]  [address] [place] [address]  [phone]  12/16- Referral sent.    [facility]  [address] [place] [address]  [phone]  12/16- Referral sent.    [facility]  [address] [place], [place] [id number] [phone]  12/16- Referral sent.    [facility]  [address] [place] [address]  [phone]  12/16- Referral sent.    [facility]  [address] [place], [place] [id number] [phone]  12/11- Called to follow up. No answer but left a voicemail requesting a call back.  12/13- Left message with admissions requesting a call back regarding referral status.  12/16-Writer spoke with admissions. They stated that they had not received the referral. Referral re-sent.    [facility]  [address] [place], [place] [id number] [phone]  12/11- SW was unable to leave a voicemail as call ended abruptly. SW will follow up again tomorrow.   12/13- Writer spoke with Reese in admissions. He states that there are no beds at this time, but he will review  patient and get back in touch with writer.  12/16- Writer left message with Reese in admissions requesting a call back regarding referral status.    [facility] [address] [place] [address] [phone]  12/11- Called to follow up. No answer but left a voicemail requesting a call back.  12/12- Received a voicemail, SW to call back in the morning regarding placement.   12/13- Writer left message with admissions requesting a call back regarding status.  12/16- Writer left message with admissions requesting a call back regarding status.    Declined Referrals     12/16: [facility] Ph: [phone] Declined due to being on sex offender registry.  12/16: [facility] Ph: [phone] Declined due to ETOH abuse, smoking, homeless.  12/16: [facility] Ph: [phone] Declined due to no bed available.  12/16: [facility] Ph: [phone] Declined due to criminal record.  12/13: [facility] Ph: [phone] Declined due to history of violence.  12/13: [facility] Ph: [phone] Declined due to history of violence.  12/13: [facility] Ph: [phone] Denied due to being on sex offender registry.  12/13: [facility] Ph: [phone] Declined due to CD concerns.  12/13: [facility] Ph: [phone] Declined due to insurance.  12/12: [facility] Ph: [phone] Declined due to background check.  12/11: [facility] Ph: [phone] Declined due to facility does not have a TCU.  12/11: [facility] Ph: [phone] Declined due to facility does not have any male beds available.     Natasha Bean, JOYCELYN, MSW  Adult Acute Care Float   Pager [phone]

## 2022-12-16 NOTE — PROGRESS NOTES
Brief CC note    Paged regarding 0200 BG of 296. Ordered one time 1 unit of insulin. Nursing noting that the pen used for very low sliding scale has different mechanism for application of dose and expressed concern that some doses during the day may not have been appropriately administered. Would discuss with day team nursing to see if this has been an issue. Consider trouble shooting issues with administration vs increasing from very low to low sliding scale.     CRISTINA Elkins MD  Internal Medicine-Pediatrics

## 2022-12-17 ENCOUNTER — APPOINTMENT (OUTPATIENT)
Dept: OCCUPATIONAL THERAPY | Facility: CLINIC | Age: 62
End: 2022-12-17
Payer: COMMERCIAL

## 2022-12-17 ENCOUNTER — APPOINTMENT (OUTPATIENT)
Dept: PHYSICAL THERAPY | Facility: CLINIC | Age: 62
End: 2022-12-17
Payer: COMMERCIAL

## 2022-12-17 LAB
GLUCOSE BLDC GLUCOMTR-MCNC: 176 MG/DL (ref 70–99)
GLUCOSE BLDC GLUCOMTR-MCNC: 178 MG/DL (ref 70–99)
GLUCOSE BLDC GLUCOMTR-MCNC: 183 MG/DL (ref 70–99)
GLUCOSE BLDC GLUCOMTR-MCNC: 244 MG/DL (ref 70–99)
GLUCOSE BLDC GLUCOMTR-MCNC: 307 MG/DL (ref 70–99)

## 2022-12-17 PROCEDURE — 250N000013 HC RX MED GY IP 250 OP 250 PS 637: Performed by: INTERNAL MEDICINE

## 2022-12-17 PROCEDURE — 97535 SELF CARE MNGMENT TRAINING: CPT | Mod: GO

## 2022-12-17 PROCEDURE — 97530 THERAPEUTIC ACTIVITIES: CPT | Mod: GP

## 2022-12-17 PROCEDURE — 250N000011 HC RX IP 250 OP 636: Performed by: NURSE PRACTITIONER

## 2022-12-17 PROCEDURE — 97116 GAIT TRAINING THERAPY: CPT | Mod: GP

## 2022-12-17 PROCEDURE — 120N000002 HC R&B MED SURG/OB UMMC

## 2022-12-17 PROCEDURE — 99232 SBSQ HOSP IP/OBS MODERATE 35: CPT | Performed by: INTERNAL MEDICINE

## 2022-12-17 RX ORDER — CLONIDINE HYDROCHLORIDE 0.1 MG/1
0.1 TABLET ORAL DAILY
Status: DISCONTINUED | OUTPATIENT
Start: 2022-12-18 | End: 2022-12-21

## 2022-12-17 RX ADMIN — PANTOPRAZOLE SODIUM 40 MG: 40 TABLET, DELAYED RELEASE ORAL at 08:11

## 2022-12-17 RX ADMIN — GABAPENTIN 300 MG: 300 CAPSULE ORAL at 08:09

## 2022-12-17 RX ADMIN — ONDANSETRON 4 MG: 4 TABLET, ORALLY DISINTEGRATING ORAL at 00:59

## 2022-12-17 RX ADMIN — LEVETIRACETAM 2000 MG: 500 TABLET, FILM COATED ORAL at 20:22

## 2022-12-17 RX ADMIN — Medication 1 MG: at 20:24

## 2022-12-17 RX ADMIN — INSULIN ASPART 1 UNITS: 100 INJECTION, SOLUTION INTRAVENOUS; SUBCUTANEOUS at 22:39

## 2022-12-17 RX ADMIN — GABAPENTIN 300 MG: 300 CAPSULE ORAL at 14:53

## 2022-12-17 RX ADMIN — INSULIN ASPART 0.5 UNITS: 100 INJECTION, SOLUTION INTRAVENOUS; SUBCUTANEOUS at 12:40

## 2022-12-17 RX ADMIN — TAMSULOSIN HYDROCHLORIDE 0.4 MG: 0.4 CAPSULE ORAL at 08:09

## 2022-12-17 RX ADMIN — ACETAMINOPHEN 650 MG: 325 TABLET, FILM COATED ORAL at 00:59

## 2022-12-17 RX ADMIN — PANCRELIPASE 3 CAPSULE: 60000; 12000; 38000 CAPSULE, DELAYED RELEASE PELLETS ORAL at 17:10

## 2022-12-17 RX ADMIN — FOLIC ACID 1 MG: 1 TABLET ORAL at 08:09

## 2022-12-17 RX ADMIN — LEVETIRACETAM 2000 MG: 500 TABLET, FILM COATED ORAL at 08:08

## 2022-12-17 RX ADMIN — PANCRELIPASE 3 CAPSULE: 60000; 12000; 38000 CAPSULE, DELAYED RELEASE PELLETS ORAL at 12:40

## 2022-12-17 RX ADMIN — GABAPENTIN 300 MG: 300 CAPSULE ORAL at 20:23

## 2022-12-17 RX ADMIN — Medication 1 TABLET: at 08:09

## 2022-12-17 RX ADMIN — Medication 1 MG: at 00:59

## 2022-12-17 RX ADMIN — THIAMINE HCL TAB 100 MG 100 MG: 100 TAB at 08:11

## 2022-12-17 RX ADMIN — INSULIN ASPART 0.5 UNITS: 100 INJECTION, SOLUTION INTRAVENOUS; SUBCUTANEOUS at 08:13

## 2022-12-17 RX ADMIN — INSULIN ASPART 0.5 UNITS: 100 INJECTION, SOLUTION INTRAVENOUS; SUBCUTANEOUS at 17:14

## 2022-12-17 RX ADMIN — ONDANSETRON 4 MG: 4 TABLET, ORALLY DISINTEGRATING ORAL at 20:24

## 2022-12-17 RX ADMIN — ACETAMINOPHEN 650 MG: 325 TABLET, FILM COATED ORAL at 17:10

## 2022-12-17 RX ADMIN — PANTOPRAZOLE SODIUM 40 MG: 40 TABLET, DELAYED RELEASE ORAL at 17:10

## 2022-12-17 RX ADMIN — PANCRELIPASE 3 CAPSULE: 60000; 12000; 38000 CAPSULE, DELAYED RELEASE PELLETS ORAL at 08:12

## 2022-12-17 RX ADMIN — METFORMIN HYDROCHLORIDE 2000 MG: 500 TABLET, EXTENDED RELEASE ORAL at 17:10

## 2022-12-17 ASSESSMENT — ACTIVITIES OF DAILY LIVING (ADL)
ADLS_ACUITY_SCORE: 28
ADLS_ACUITY_SCORE: 26

## 2022-12-17 NOTE — PLAN OF CARE
VS: Temp: 97.7  F (36.5  C) Temp src: Oral BP: 113/49 Pulse: 71   Resp: 18 SpO2: 99 % O2 Device: None (Room air)       O2: On room air   Output: Voids spontaneously in bathroom and urinal   Last BM: 12/16 per patient   Activity: SBA of 1, gait belt and walker.   Skin: Intact, unkept   Pain: 6-8/10 for headache. Tylenol given   CMS: INTACT   Dressing: N/A   Diet: REG- patient is insulin dependent diabetic   LDA: PIV to R arm, patent, SL   Equipment: walker   Plan: Awaiting placement   Additional Info:

## 2022-12-17 NOTE — PROGRESS NOTES
[facility]    Medicine Progress Note - Hospitalist Service, GOLD TEAM 18    Date of Admission:  12/6/2022    Assessment & Plan   Patient is a 63 y/o man who has multiple medical problems including alcohol dependence, alcoholic pancreatitis, chronic pancreatitis, past pancreatic stents, seizure disorder, COPD, hypokalemia and microcytic anemia. Patient presented with concerns about seizure activity. Patient was found to have alcohol withdrawal and alcoholic ketoacidosis, both of which appear resolved.     #Alcohol dependence with alcohol withdrawal; withdrawal appears resolve  -- Monitoring for changes  -- Continue to down-titrate clonidine    #Alcoholic ketoacidosis, appears resolved  -- No active intervention indicated at present    #Esophagitis  -- Protonix 40 mg PO BID    #Hypoxemia of unclear cause, patient now on room air  -- Monitoring for additional symptoms  -- Monitoring respiratory status.    #Seizure disorder  -- Patient on seizure precautions for now.  -- Patient back on Keppra.    #Generalized weakness  -- PT/OT    #Peripheral neuropathy  -- Patient back on gabapentin.    #Chronic pancreatitis  -- Patient on Creon.     #Microcytic anemia; iron deficiency anemia  -- Anticipate that patient will eventually be on supplemental iron as outpatient   Patient would benefit from colonoscopy as outpatient.    #Peripheral edema  -- Monitoring for additional symptoms.    #Covid-19 exposure; PCR was negative  -- Monitoring for symptoms  -- If patient has symptoms suggestive of Covid-19 infection, PCR will be repeated.    #Left lower lobe nodule  -- CT chest in 3 months as outpatient.    #COPD  -- Patient compensated  -- Monitoring for changes.    #Blindness  -- Patient has history of left eye enucleation and patient has poor vision in right eye  -- Monitoring for safety.    #T2DM; patient listed as being on metformin as outpatient  -- Serum glucose suboptimally  controlled  -- Restart PTA metformin  -- Patient on insulin sliding scale.    #Hypokalemia, hypomagnesemia, hypophosphatemia  -- Supplementing as needed.    #Dysuria  -- Patient had negative urinalysis and did not appear to have urinary retention  -- Monitoring for additional symptoms.    #Constipation  -- Bowel regimen.    #Generalized abdominal discomfort of unclear cause  -- Acetaminophen as needed. Monitoring for additional symptoms.    #Left ear stage 2 pressure injury, hospital acquired  -- Wound care.       Diet: Combination Diet Regular Diet Adult  Snacks/Supplements Adult: Ensure Enlive; With Meals  Room Service    DVT Prophylaxis: Pneumatic Compression Devices  Trejo Catheter: Not present  Central Lines: None  Cardiac Monitoring: None  Code Status: Full Code      Disposition Plan    Patient pending placement.     The patient's care was discussed with the Bedside Nurse, Care Coordinator/ and Patient.    Torrey Mares, DO  Hospitalist Service, GOLD TEAM 18  [facility]  Securely message with the Vocera Web Console (learn more here)  Text page via [facility] Paging/Directory   Please see signed in provider for up to date coverage information      Clinically Significant Risk Factors         # Hyponatremia: Lowest Na = 132 mmol/L in last 2 days, will monitor as appropriate      # Hypoalbuminemia: Lowest albumin = 2.5 g/dL at 12/9/2022  7:10 AM, will monitor as appropriate          # DMII: A1C = 6.7 % (Ref range: <5.7 %) within past 3 months           ______________________________________________________________________    Interval History   Patient sleeping comfortably upon entering room.  Per nursing staff, no acute events overPM.  Patient is pending placement.    Data reviewed today: I reviewed all medications, new labs and imaging results over the last 24 hours. I personally reviewed no images or EKG's today.    Physical Exam   Vital Signs: Temp: 97.7  F  (36.5  C) Temp src: Oral BP: 104/65 Pulse: 85   Resp: 17 SpO2: 97 % O2 Device: None (Room air)    Weight: 168 lbs 14.4 oz    GENERAL: Alert and oriented x 3; no acute distress; well-nourished.  HEENT: Normocephalic; atraumatic; blindness; MMM.  CV: RRR; normal S1, S2; no rubs, murmurs, or gallops.  RESP: Lung fields clear to aucultation B/L; no wheezing or crepitations.  GI: Abdomen is soft, nontender, nondistended; no organomegaly; normal bowel sounds.  : Deferred genital examination.   MSK: No clubbing, cyanosis, or edema.  DERM: Skin is intact; no rash, lesions, or skin breakdown.  NEURO: No focal deficits appreciated; strength & sensorium are grossly intact.  PSYCH: No active hallucinations; affect, insight appear within normal limits.    Data   Recent Labs   Lab 12/17/22  1146 12/17/22  0741 12/17/22  0242 12/16/22  0808 12/16/22  0621 12/15/22  1233 12/15/22  0856 12/14/22  2204 12/14/22  1920 12/12/22  0814 12/12/22  0724 12/11/22  0855 12/11/22  0710   WBC  --   --   --   --  5.9  --   --   --   --   --   --   --  7.4   HGB  --   --   --   --  8.0*  --   --   --   --   --   --   --  8.2*   MCV  --   --   --   --  72*  --   --   --   --   --   --   --  72*   PLT  --   --   --   --  245  --  213  --   --   --  127*  --  114*   NA  --   --   --   --  132*  --   --   --   --   --   --   --  133   POTASSIUM  --   --   --   --  3.7  --  4.0  --  4.2   < > 4.2  --  4.0   CHLORIDE  --   --   --   --  103  --   --   --   --   --   --   --  104   CO2  --   --   --   --  25  --   --   --   --   --   --   --  23   BUN  --   --   --   --  12  --   --   --   --   --   --   --  9   CR  --   --   --   --  0.51*  --  0.51*  --   --   --  0.56*  --  0.51*   ANIONGAP  --   --   --   --  4  --   --   --   --   --   --   --  6   RASHMI  --   --   --   --  8.6  --   --   --   --   --   --   --  8.9   * 176* 244*   < > 271*   < >  --    < >  --    < >  --    < > 175*   ALBUMIN  --   --   --   --  2.6*  --   --   --   --   --    --   --   --    PROTTOTAL  --   --   --   --  7.6  --   --   --   --   --   --   --   --    BILITOTAL  --   --   --   --  0.3  --   --   --   --   --   --   --   --    ALKPHOS  --   --   --   --  78  --   --   --   --   --   --   --   --    ALT  --   --   --   --  33  --   --   --   --   --   --   --   --    AST  --   --   --   --  46*  --   --   --   --   --   --   --   --     < > = values in this interval not displayed.     No results found for this or any previous visit (from the past 24 hour(s)).  Medications     Injection Device for insulin         amylase-lipase-protease  3 capsule Oral TID w/meals     [START ON 12/18/2022] cloNIDine  0.1 mg Oral Daily     docusate sodium  100 mg Oral BID     folic acid  1 mg Oral Daily     gabapentin  300 mg Oral TID     insulin aspart  0.5-2.5 Units Subcutaneous TID AC     insulin aspart  0.5-2.5 Units Subcutaneous At Bedtime     levETIRAcetam  2,000 mg Oral BID     metFORMIN  2,000 mg Oral Daily with supper     multivitamin w/minerals  1 tablet Oral Daily     pantoprazole  40 mg Oral BID AC     polyethylene glycol  17 g Oral Daily     sodium chloride (PF)  3 mL Intracatheter Q8H     tamsulosin  0.4 mg Oral Daily     thiamine  100 mg Oral Daily

## 2022-12-17 NOTE — PLAN OF CARE
"/70 (BP Location: Left arm)   Pulse 75   Temp 98.1  F (36.7  C) (Oral)   Resp 16   Ht 1.778 m (5' 10\")   Wt 76.6 kg (168 lb 14.4 oz)   SpO2 98%   BMI 24.23 kg/m      Patient A&Ox4. Able to make needs known.   Ax1 with walker and gait belt.   Patient voiding spontaneously using urinal.   Regular diet.   C/o Headache. PRN medication given.   RPIV SL.   CIWA scored. No intervention needed.   BS and Insulin checks.       Continue with POC.     "

## 2022-12-17 NOTE — PLAN OF CARE
"Pt has been calm and cooperative this shift. Noticed pt's apical HR as well as radial pulses were irregular. Could not find explanation in notes. Notified the provider. Later in the shift pt mentioned that he had blood in his urine and stated it felt \"clumpy\". Provider would like staff to continue to monitor both situations. Pt did not receive any PRN medication this shift. Continue with POC.  "

## 2022-12-17 NOTE — PROGRESS NOTES
Brief Medicine Cross-Cover Note:    Was notified by RN regarding patient still on clonidine and but has completed etoh withdrawal. Bps good. Will decrease clonidine from Q8H to BID and loosen hold parameter; ultimately he will need to be weaned off to avoid any rebound hypertension.    Miki Herrera PA-C on 12/16/2022 at 9:45 PM

## 2022-12-18 ENCOUNTER — APPOINTMENT (OUTPATIENT)
Dept: PHYSICAL THERAPY | Facility: CLINIC | Age: 62
End: 2022-12-18
Payer: COMMERCIAL

## 2022-12-18 LAB
ALBUMIN UR-MCNC: NEGATIVE MG/DL
APPEARANCE UR: CLEAR
BILIRUB UR QL STRIP: NEGATIVE
COLOR UR AUTO: YELLOW
CREAT SERPL-MCNC: 0.51 MG/DL (ref 0.66–1.25)
GFR SERPL CREATININE-BSD FRML MDRD: >90 ML/MIN/1.73M2
GLUCOSE BLDC GLUCOMTR-MCNC: 155 MG/DL (ref 70–99)
GLUCOSE BLDC GLUCOMTR-MCNC: 177 MG/DL (ref 70–99)
GLUCOSE BLDC GLUCOMTR-MCNC: 209 MG/DL (ref 70–99)
GLUCOSE BLDC GLUCOMTR-MCNC: 228 MG/DL (ref 70–99)
GLUCOSE BLDC GLUCOMTR-MCNC: 231 MG/DL (ref 70–99)
GLUCOSE UR STRIP-MCNC: NEGATIVE MG/DL
HGB UR QL STRIP: NEGATIVE
KETONES UR STRIP-MCNC: NEGATIVE MG/DL
LEUKOCYTE ESTERASE UR QL STRIP: NEGATIVE
NITRATE UR QL: NEGATIVE
PH UR STRIP: 7 [PH] (ref 5–7)
PLATELET # BLD AUTO: 298 10E3/UL (ref 150–450)
SP GR UR STRIP: 1.02 (ref 1–1.03)
UROBILINOGEN UR STRIP-MCNC: NORMAL MG/DL

## 2022-12-18 PROCEDURE — 120N000002 HC R&B MED SURG/OB UMMC

## 2022-12-18 PROCEDURE — 250N000013 HC RX MED GY IP 250 OP 250 PS 637: Performed by: INTERNAL MEDICINE

## 2022-12-18 PROCEDURE — 36415 COLL VENOUS BLD VENIPUNCTURE: CPT | Performed by: INTERNAL MEDICINE

## 2022-12-18 PROCEDURE — 81003 URINALYSIS AUTO W/O SCOPE: CPT | Performed by: INTERNAL MEDICINE

## 2022-12-18 PROCEDURE — 97530 THERAPEUTIC ACTIVITIES: CPT | Mod: GP

## 2022-12-18 PROCEDURE — 82565 ASSAY OF CREATININE: CPT | Performed by: INTERNAL MEDICINE

## 2022-12-18 PROCEDURE — 99232 SBSQ HOSP IP/OBS MODERATE 35: CPT | Performed by: INTERNAL MEDICINE

## 2022-12-18 PROCEDURE — 85049 AUTOMATED PLATELET COUNT: CPT | Performed by: INTERNAL MEDICINE

## 2022-12-18 PROCEDURE — 97116 GAIT TRAINING THERAPY: CPT | Mod: GP

## 2022-12-18 RX ADMIN — INSULIN ASPART 0.5 UNITS: 100 INJECTION, SOLUTION INTRAVENOUS; SUBCUTANEOUS at 22:50

## 2022-12-18 RX ADMIN — INSULIN ASPART 0.5 UNITS: 100 INJECTION, SOLUTION INTRAVENOUS; SUBCUTANEOUS at 16:58

## 2022-12-18 RX ADMIN — ACETAMINOPHEN 650 MG: 325 TABLET, FILM COATED ORAL at 20:07

## 2022-12-18 RX ADMIN — METFORMIN HYDROCHLORIDE 2000 MG: 500 TABLET, EXTENDED RELEASE ORAL at 16:56

## 2022-12-18 RX ADMIN — PANCRELIPASE 3 CAPSULE: 60000; 12000; 38000 CAPSULE, DELAYED RELEASE PELLETS ORAL at 13:09

## 2022-12-18 RX ADMIN — Medication 1 MG: at 20:08

## 2022-12-18 RX ADMIN — PANTOPRAZOLE SODIUM 40 MG: 40 TABLET, DELAYED RELEASE ORAL at 16:55

## 2022-12-18 RX ADMIN — TAMSULOSIN HYDROCHLORIDE 0.4 MG: 0.4 CAPSULE ORAL at 07:57

## 2022-12-18 RX ADMIN — Medication 1 TABLET: at 07:57

## 2022-12-18 RX ADMIN — GABAPENTIN 300 MG: 300 CAPSULE ORAL at 07:57

## 2022-12-18 RX ADMIN — PANTOPRAZOLE SODIUM 40 MG: 40 TABLET, DELAYED RELEASE ORAL at 07:58

## 2022-12-18 RX ADMIN — PANCRELIPASE 3 CAPSULE: 60000; 12000; 38000 CAPSULE, DELAYED RELEASE PELLETS ORAL at 07:56

## 2022-12-18 RX ADMIN — INSULIN ASPART 0.5 UNITS: 100 INJECTION, SOLUTION INTRAVENOUS; SUBCUTANEOUS at 08:00

## 2022-12-18 RX ADMIN — GABAPENTIN 300 MG: 300 CAPSULE ORAL at 14:34

## 2022-12-18 RX ADMIN — CLONIDINE HYDROCHLORIDE 0.1 MG: 0.1 TABLET ORAL at 07:58

## 2022-12-18 RX ADMIN — INSULIN ASPART 1 UNITS: 100 INJECTION, SOLUTION INTRAVENOUS; SUBCUTANEOUS at 13:10

## 2022-12-18 RX ADMIN — LEVETIRACETAM 2000 MG: 500 TABLET, FILM COATED ORAL at 19:56

## 2022-12-18 RX ADMIN — THIAMINE HCL TAB 100 MG 100 MG: 100 TAB at 07:58

## 2022-12-18 RX ADMIN — GABAPENTIN 300 MG: 300 CAPSULE ORAL at 19:57

## 2022-12-18 RX ADMIN — LEVETIRACETAM 2000 MG: 500 TABLET, FILM COATED ORAL at 07:56

## 2022-12-18 RX ADMIN — FOLIC ACID 1 MG: 1 TABLET ORAL at 07:58

## 2022-12-18 RX ADMIN — PANCRELIPASE 3 CAPSULE: 60000; 12000; 38000 CAPSULE, DELAYED RELEASE PELLETS ORAL at 16:56

## 2022-12-18 ASSESSMENT — ACTIVITIES OF DAILY LIVING (ADL)
ADLS_ACUITY_SCORE: 27
ADLS_ACUITY_SCORE: 27
ADLS_ACUITY_SCORE: 28
ADLS_ACUITY_SCORE: 27
ADLS_ACUITY_SCORE: 28
ADLS_ACUITY_SCORE: 27
ADLS_ACUITY_SCORE: 28

## 2022-12-18 NOTE — PLAN OF CARE
Pt was calm and cooperative this shift. Pt stated that he would still like to decline BM meds, stating that it makes them too loose. Pt states he had a BM yesterday. Pt is still worried about blood being found in urine and stool. Pt was told to not flush the toilet after using it to have staff look at it. Provider ordered stool and urine samples to be taken from pt. Pt did not have any complaints this shift. Continue with POC.

## 2022-12-18 NOTE — PLAN OF CARE
Goal Outcome Evaluation:       AxOx4. Assist x1 with walker and gaitbelt. Cont of B/B. Pt has significant vision loss in both eyes. Multiple scabs/lacerations/wounds on his body. Dressings to R elbow and L knee changed this shift. Continues on seizure precautions. BG checks, see results. Reports headache pain, pain in legs, and nausea. Zofran given with positive effect. Tylenol given with positive effect for pain. Call light within reach. No withdrawal symptoms noted. Continue with POC.

## 2022-12-18 NOTE — PROGRESS NOTES
Patient is comfortable. Pleasant, No complaints. Monitoring for blood in stool and urine per patient .Instructed not to discard urine or flush his BM.  Vitals stable, on room air. No changes to patient's baseline

## 2022-12-18 NOTE — PROGRESS NOTES
[facility]    Medicine Progress Note - Hospitalist Service, GOLD TEAM 18    Date of Admission:  12/6/2022    Assessment & Plan   Patient is a 63 y/o man who has multiple medical problems including alcohol dependence, alcoholic pancreatitis, chronic pancreatitis, past pancreatic stents, seizure disorder, COPD, hypokalemia and microcytic anemia. Patient presented with concerns about seizure activity. Patient was found to have alcohol withdrawal and alcoholic ketoacidosis, both of which appear resolved.     #Alcohol dependence with alcohol withdrawal; withdrawal appears resolve  -- Monitoring for changes  -- Continue to down-titrate clonidine    #Alcoholic ketoacidosis, appears resolved  -- No active intervention indicated at present    #Esophagitis  -- Protonix 40 mg PO BID    #Hypoxemia of unclear cause, patient now on room air  -- Monitoring for additional symptoms  -- Monitoring respiratory status.    #Seizure disorder  -- Patient on seizure precautions for now.  -- Patient back on Keppra.    #Generalized weakness  -- PT/OT    #Peripheral neuropathy  -- Patient back on gabapentin.    #Chronic pancreatitis  -- Patient on Creon.     #Microcytic anemia; iron deficiency anemia  -- Anticipate that patient will eventually be on supplemental iron as outpatient   Patient would benefit from colonoscopy as outpatient.    #Peripheral edema  -- Monitoring for additional symptoms.    #Covid-19 exposure; PCR was negative  -- Monitoring for symptoms  -- If patient has symptoms suggestive of Covid-19 infection, PCR will be repeated.    #Left lower lobe nodule  -- CT chest in 3 months as outpatient.    #COPD  -- Patient compensated  -- Monitoring for changes.    #Blindness  -- Patient has history of left eye enucleation and patient has poor vision in right eye  -- Monitoring for safety.    #T2DM; patient listed as being on metformin as outpatient  -- Serum glucose suboptimally  controlled  -- Restart PTA metformin  -- Patient on insulin sliding scale.    #Hypokalemia, hypomagnesemia, hypophosphatemia  -- Supplementing as needed.    #Dysuria  -- Patient had negative urinalysis and did not appear to have urinary retention  -- Monitoring for additional symptoms.    #Constipation  -- Bowel regimen.    #Generalized abdominal discomfort of unclear cause  -- Acetaminophen as needed. Monitoring for additional symptoms.    #Left ear stage 2 pressure injury, hospital acquired  -- Wound care.       Diet: Combination Diet Regular Diet Adult  Snacks/Supplements Adult: Ensure Enlive; With Meals  Room Service    DVT Prophylaxis: Pneumatic Compression Devices  Trejo Catheter: Not present  Central Lines: None  Cardiac Monitoring: None  Code Status: Full Code      Disposition Plan      Expected Discharge Date: 12/23/2022    Discharge Delays: Placement - TCU  Destination: nursing home  Discharge Comments: pending TCU placement        The patient's care was discussed with the Bedside Nurse, Care Coordinator/ and Patient.    Torrey Mares DO  Hospitalist Service, GOLD TEAM [address] [place] [address]  Securely message with the Vocera Web Console (learn more here)  Text page via [facility] Paging/Directory   Please see signed in provider for up to date coverage information      Clinically Significant Risk Factors              # Hypoalbuminemia: Lowest albumin = 2.5 g/dL at 12/9/2022  7:10 AM, will monitor as appropriate          # DMII: A1C = 6.7 % (Ref range: <5.7 %) within past 3 months           ______________________________________________________________________    Interval History   Patient reports feeling OK today.  Patient fails to endorse any specific symptoms.  Patient denies chest pain, SOB.  Patient denies abdominal pain, nausea, vomiting.  Per nursing staff, patient reports blood clots in urine.  Per nursing staff, patient reports blood in stool.    Data  reviewed today: I reviewed all medications, new labs and imaging results over the last 24 hours. I personally reviewed no images or EKG's today.    Physical Exam   Vital Signs: Temp: 99.4  F (37.4  C) Temp src: Oral BP: 117/61 Pulse: 80   Resp: 18 SpO2: 94 % O2 Device: None (Room air)    Weight: 168 lbs 14.4 oz    GENERAL: Alert and oriented x 3; no acute distress; well-nourished.  HEENT: Normocephalic; atraumatic; PERRLA; MMM.  CV: RRR; normal S1, S2; no rubs, murmurs, or gallops.  RESP: Lung fields clear to aucultation B/L; no wheezing or crepitations.  GI: Abdomen is soft, nontender, nondistended; no organomegaly; normal bowel sounds.  : Deferred genital examination.   MSK: No clubbing, cyanosis, or edema.  DERM: Skin is intact; no rash, lesions, or skin breakdown.  NEURO: No focal deficits appreciated; strength & sensorium are grossly intact.  PSYCH: No active hallucinations; affect, insight appear within normal limits.    Data   Recent Labs   Lab 12/18/22  1212 12/18/22  0715 12/18/22  0701 12/18/22  0211 12/16/22  0808 12/16/22  0621 12/15/22  1233 12/15/22  0856 12/14/22  2204 12/14/22  1920   WBC  --   --   --   --   --  5.9  --   --   --   --    HGB  --   --   --   --   --  8.0*  --   --   --   --    MCV  --   --   --   --   --  72*  --   --   --   --    PLT  --   --  298  --   --  245  --  213  --   --    NA  --   --   --   --   --  132*  --   --   --   --    POTASSIUM  --   --   --   --   --  3.7  --  4.0  --  4.2   CHLORIDE  --   --   --   --   --  103  --   --   --   --    CO2  --   --   --   --   --  25  --   --   --   --    BUN  --   --   --   --   --  12  --   --   --   --    CR  --   --  0.51*  --   --  0.51*  --  0.51*  --   --    ANIONGAP  --   --   --   --   --  4  --   --   --   --    RASHMI  --   --   --   --   --  8.6  --   --   --   --    * 177*  --  209*   < > 271*   < >  --    < >  --    ALBUMIN  --   --   --   --   --  2.6*  --   --   --   --    PROTTOTAL  --   --   --   --   --  7.6   --   --   --   --    BILITOTAL  --   --   --   --   --  0.3  --   --   --   --    ALKPHOS  --   --   --   --   --  78  --   --   --   --    ALT  --   --   --   --   --  33  --   --   --   --    AST  --   --   --   --   --  46*  --   --   --   --     < > = values in this interval not displayed.     No results found for this or any previous visit (from the past 24 hour(s)).  Medications     Injection Device for insulin         amylase-lipase-protease  3 capsule Oral TID w/meals     cloNIDine  0.1 mg Oral Daily     docusate sodium  100 mg Oral BID     folic acid  1 mg Oral Daily     gabapentin  300 mg Oral TID     insulin aspart  0.5-2.5 Units Subcutaneous TID AC     insulin aspart  0.5-2.5 Units Subcutaneous At Bedtime     levETIRAcetam  2,000 mg Oral BID     metFORMIN  2,000 mg Oral Daily with supper     multivitamin w/minerals  1 tablet Oral Daily     pantoprazole  40 mg Oral BID AC     polyethylene glycol  17 g Oral Daily     sodium chloride (PF)  3 mL Intracatheter Q8H     tamsulosin  0.4 mg Oral Daily     thiamine  100 mg Oral Daily

## 2022-12-18 NOTE — PLAN OF CARE
Goal Outcome Evaluation:         No acute changes overnight. Pt rest most of shift. Pt has impaired vision. Multiple wounds on body. Rgt PIV SL. Waiting for placement.

## 2022-12-19 ENCOUNTER — APPOINTMENT (OUTPATIENT)
Dept: PHYSICAL THERAPY | Facility: CLINIC | Age: 62
End: 2022-12-19
Payer: COMMERCIAL

## 2022-12-19 LAB
GLUCOSE BLDC GLUCOMTR-MCNC: 137 MG/DL (ref 70–99)
GLUCOSE BLDC GLUCOMTR-MCNC: 158 MG/DL (ref 70–99)
GLUCOSE BLDC GLUCOMTR-MCNC: 183 MG/DL (ref 70–99)
GLUCOSE BLDC GLUCOMTR-MCNC: 187 MG/DL (ref 70–99)
GLUCOSE BLDC GLUCOMTR-MCNC: 192 MG/DL (ref 70–99)
HEMOCCULT STL QL: NEGATIVE

## 2022-12-19 PROCEDURE — 82272 OCCULT BLD FECES 1-3 TESTS: CPT | Performed by: INTERNAL MEDICINE

## 2022-12-19 PROCEDURE — 97116 GAIT TRAINING THERAPY: CPT | Mod: GP

## 2022-12-19 PROCEDURE — 250N000013 HC RX MED GY IP 250 OP 250 PS 637: Performed by: INTERNAL MEDICINE

## 2022-12-19 PROCEDURE — 99232 SBSQ HOSP IP/OBS MODERATE 35: CPT | Performed by: INTERNAL MEDICINE

## 2022-12-19 PROCEDURE — 97530 THERAPEUTIC ACTIVITIES: CPT | Mod: GP

## 2022-12-19 PROCEDURE — G0463 HOSPITAL OUTPT CLINIC VISIT: HCPCS

## 2022-12-19 PROCEDURE — 120N000002 HC R&B MED SURG/OB UMMC

## 2022-12-19 RX ADMIN — INSULIN ASPART 0.5 UNITS: 100 INJECTION, SOLUTION INTRAVENOUS; SUBCUTANEOUS at 07:37

## 2022-12-19 RX ADMIN — INSULIN ASPART 0.5 UNITS: 100 INJECTION, SOLUTION INTRAVENOUS; SUBCUTANEOUS at 16:50

## 2022-12-19 RX ADMIN — DOCUSATE SODIUM 100 MG: 100 CAPSULE, LIQUID FILLED ORAL at 19:59

## 2022-12-19 RX ADMIN — Medication 1 MG: at 20:02

## 2022-12-19 RX ADMIN — LEVETIRACETAM 2000 MG: 500 TABLET, FILM COATED ORAL at 19:58

## 2022-12-19 RX ADMIN — THIAMINE HCL TAB 100 MG 100 MG: 100 TAB at 07:35

## 2022-12-19 RX ADMIN — TAMSULOSIN HYDROCHLORIDE 0.4 MG: 0.4 CAPSULE ORAL at 07:36

## 2022-12-19 RX ADMIN — GABAPENTIN 300 MG: 300 CAPSULE ORAL at 14:01

## 2022-12-19 RX ADMIN — GABAPENTIN 300 MG: 300 CAPSULE ORAL at 07:35

## 2022-12-19 RX ADMIN — Medication 1 TABLET: at 07:35

## 2022-12-19 RX ADMIN — METFORMIN HYDROCHLORIDE 2000 MG: 500 TABLET, EXTENDED RELEASE ORAL at 16:45

## 2022-12-19 RX ADMIN — PANCRELIPASE 3 CAPSULE: 60000; 12000; 38000 CAPSULE, DELAYED RELEASE PELLETS ORAL at 07:37

## 2022-12-19 RX ADMIN — ACETAMINOPHEN 650 MG: 325 TABLET, FILM COATED ORAL at 07:46

## 2022-12-19 RX ADMIN — ACETAMINOPHEN 650 MG: 325 TABLET, FILM COATED ORAL at 20:02

## 2022-12-19 RX ADMIN — PANCRELIPASE 3 CAPSULE: 60000; 12000; 38000 CAPSULE, DELAYED RELEASE PELLETS ORAL at 12:29

## 2022-12-19 RX ADMIN — ACETAMINOPHEN 650 MG: 325 TABLET, FILM COATED ORAL at 02:31

## 2022-12-19 RX ADMIN — PANTOPRAZOLE SODIUM 40 MG: 40 TABLET, DELAYED RELEASE ORAL at 07:34

## 2022-12-19 RX ADMIN — GABAPENTIN 300 MG: 300 CAPSULE ORAL at 19:59

## 2022-12-19 RX ADMIN — LEVETIRACETAM 2000 MG: 500 TABLET, FILM COATED ORAL at 07:35

## 2022-12-19 RX ADMIN — PANCRELIPASE 3 CAPSULE: 60000; 12000; 38000 CAPSULE, DELAYED RELEASE PELLETS ORAL at 18:40

## 2022-12-19 RX ADMIN — FOLIC ACID 1 MG: 1 TABLET ORAL at 07:36

## 2022-12-19 RX ADMIN — PANTOPRAZOLE SODIUM 40 MG: 40 TABLET, DELAYED RELEASE ORAL at 16:44

## 2022-12-19 ASSESSMENT — ACTIVITIES OF DAILY LIVING (ADL)
ADLS_ACUITY_SCORE: 27

## 2022-12-19 NOTE — PROGRESS NOTES
"    VS: /55 (BP Location: Right arm, Patient Position: Left side, Cuff Size: Adult Regular)   Pulse 78   Temp 98.5  F (36.9  C) (Axillary)   Resp 16   Ht 1.778 m (5' 10\")   Wt 76.6 kg (168 lb 14.4 oz)   SpO2 98%   BMI 24.23 kg/m       O2: Sating >90% on RA. Lung sounds clear. Denies chest pain and SOB.   Output: Voids spontaneously and adequately. Urinal at bedside    Last BM: LBM 12/19  Bowel sounds active x4. Passing flatus.    Activity: Up with  Ax1  assist, FWW, and gait belt.    Skin: Multiple little wounds on body some covered with Mepliex    Pain: Pain was managed with Tylenol    CMS: A&Ox4. Denies N/T.    Dressing: Dressing to  Elbow and knee C/D/I.   Diet: Regular. Appetite was good. BG checks with sliding scale .    LDA: PIV to R  is S/L.    Equipment: IV pole, FWW, gait belt, and personal belongings. Call light within reach and uses appropriately.   Plan:  Waiting for placement    Additional Info: Pt. Complained about pain during the shift and was given Tylenol. Pt. Slept for most of the shift. Stool sample still needed.   Continue POC.         "

## 2022-12-19 NOTE — PLAN OF CARE
"/61 (BP Location: Right arm)   Pulse 72   Temp 98.7  F (37.1  C) (Oral)   Resp 14   Ht 1.778 m (5' 10\")   Wt 76.6 kg (168 lb 14.4 oz)   SpO2 97%   BMI 24.23 kg/m  on RA.    Good appetite and ate 75% of meal and having some snacks.   , 231 --Sliding scale insulin given ,  Urine sent to lab for UA ---negative.      Pt had BM today  but not saved for occult blood test,   reminded pt to  save stool next time     Up to bathroom by himself,  no call for help.  Pt is blind and  on fall risk---Bed alarm on and call light in reach.  Walker and gait belt use when OOB .     New Mepilex dressing applied to Rt elbow and left knee.    Will continue to monitor.        "

## 2022-12-19 NOTE — PROGRESS NOTES
[facility]    Medicine Progress Note - Hospitalist Service, GOLD TEAM 18    Date of Admission:  12/6/2022    Assessment & Plan   Patient is a 61 y/o man who has multiple medical problems including alcohol dependence, alcoholic pancreatitis, chronic pancreatitis, past pancreatic stents, seizure disorder, COPD, hypokalemia and microcytic anemia. Patient presented with concerns about seizure activity. Patient was found to have alcohol withdrawal and alcoholic ketoacidosis, both of which appear resolved. Patient is pending placement.     #Alcohol dependence with alcohol withdrawal; withdrawal appears resolve  -- Monitoring for changes  -- Continue to down-titrate clonidine    #Alcoholic ketoacidosis, appears resolved  -- No active intervention indicated at present    #Esophagitis  -- Protonix 40 mg PO BID    #Hypoxemia of unclear cause, patient now on room air  -- Monitoring for additional symptoms  -- Monitoring respiratory status.    #Seizure disorder  -- Patient on seizure precautions for now.  -- Patient back on Keppra.    #Generalized weakness  -- PT/OT    #Peripheral neuropathy  -- Patient back on gabapentin.    #Chronic pancreatitis  -- Patient on Creon.     #Microcytic anemia; iron deficiency anemia  -- Anticipate that patient will eventually be on supplemental iron as outpatient   Patient would benefit from colonoscopy as outpatient.    #Peripheral edema  -- Monitoring for additional symptoms.    #Covid-19 exposure; PCR was negative  -- Monitoring for symptoms  -- If patient has symptoms suggestive of Covid-19 infection, PCR will be repeated.    #Left lower lobe nodule  -- CT chest in 3 months as outpatient.    #COPD  -- Patient compensated  -- Monitoring for changes.    #Blindness  -- Patient has history of left eye enucleation and patient has poor vision in right eye  -- Monitoring for safety.    #T2DM; patient listed as being on metformin as outpatient  -- Serum  glucose suboptimally controlled  -- Restart PTA metformin  -- Patient on insulin sliding scale.    #Hypokalemia, hypomagnesemia, hypophosphatemia  -- Supplementing as needed.    #Dysuria  -- Patient had negative urinalysis and did not appear to have urinary retention  -- Monitoring for additional symptoms.    #Constipation  -- Bowel regimen.    #Generalized abdominal discomfort of unclear cause  -- Acetaminophen as needed. Monitoring for additional symptoms.    #Left ear stage 2 pressure injury, hospital acquired  -- Wound care.       Diet: Combination Diet Regular Diet Adult  Snacks/Supplements Adult: Ensure Enlive; With Meals  Room Service    DVT Prophylaxis: Pneumatic Compression Devices  Trejo Catheter: Not present  Central Lines: None  Cardiac Monitoring: None  Code Status: Full Code      Disposition Plan     Expected Discharge Date: 12/23/2022    Discharge Delays: Placement - TCU  Destination: nursing home  Discharge Comments: pending TCU placement        The patient's care was discussed with the Bedside Nurse, Care Coordinator/ and Patient.    Torrey Mares DO  Hospitalist Service, GOLD TEAM [address] [place] [address]  Securely message with the Vocera Web Console (learn more here)  Text page via [facility] Paging/Directory   Please see signed in provider for up to date coverage information      Clinically Significant Risk Factors              # Hypoalbuminemia: Lowest albumin = 2.5 g/dL at 12/9/2022  7:10 AM, will monitor as appropriate          # DMII: A1C = 6.7 % (Ref range: <5.7 %) within past 3 months           ______________________________________________________________________    Interval History   Patient reports feeling better everyday.  Patient fails to endorse any specific issues.  Patient denies chest pain, SOB.  Patient denies abdominal pain, nausea, vomiting.    Data reviewed today: I reviewed all medications, new labs and imaging results over the last 24  hours. I personally reviewed no images or EKG's today.    Physical Exam   Vital Signs: Temp: 98.5  F (36.9  C) Temp src: Oral BP: 115/61 Pulse: 71   Resp: 16 SpO2: 95 % O2 Device: None (Room air)    Weight: 168 lbs 14.4 oz    GENERAL: Alert and oriented x 3; no acute distress; well-nourished.  HEENT: Normocephalic; atraumatic; PERRLA; MMM.  CV: RRR; normal S1, S2; no rubs, murmurs, or gallops.  RESP: Lung fields clear to aucultation B/L; no wheezing or crepitations.  GI: Abdomen is soft, nontender, nondistended; no organomegaly; normal bowel sounds.  : Deferred genital examination.   MSK: No clubbing, cyanosis, or edema.  DERM: Skin is intact; no rash, lesions, or skin breakdown.  NEURO: No focal deficits appreciated; strength & sensorium are grossly intact.  PSYCH: No active hallucinations; affect, insight appear within normal limits.    Data   Recent Labs   Lab 12/19/22  1142 12/19/22  0704 12/19/22  0220 12/18/22  0715 12/18/22  0701 12/16/22  0808 12/16/22  0621 12/15/22  1233 12/15/22  0856 12/14/22  2204 12/14/22  1920   WBC  --   --   --   --   --   --  5.9  --   --   --   --    HGB  --   --   --   --   --   --  8.0*  --   --   --   --    MCV  --   --   --   --   --   --  72*  --   --   --   --    PLT  --   --   --   --  298  --  245  --  213  --   --    NA  --   --   --   --   --   --  132*  --   --   --   --    POTASSIUM  --   --   --   --   --   --  3.7  --  4.0  --  4.2   CHLORIDE  --   --   --   --   --   --  103  --   --   --   --    CO2  --   --   --   --   --   --  25  --   --   --   --    BUN  --   --   --   --   --   --  12  --   --   --   --    CR  --   --   --   --  0.51*  --  0.51*  --  0.51*  --   --    ANIONGAP  --   --   --   --   --   --  4  --   --   --   --    RASHMI  --   --   --   --   --   --  8.6  --   --   --   --    * 183* 192*   < >  --    < > 271*   < >  --    < >  --    ALBUMIN  --   --   --   --   --   --  2.6*  --   --   --   --    PROTTOTAL  --   --   --   --   --   --   7.6  --   --   --   --    BILITOTAL  --   --   --   --   --   --  0.3  --   --   --   --    ALKPHOS  --   --   --   --   --   --  78  --   --   --   --    ALT  --   --   --   --   --   --  33  --   --   --   --    AST  --   --   --   --   --   --  46*  --   --   --   --     < > = values in this interval not displayed.     No results found for this or any previous visit (from the past 24 hour(s)).  Medications     Injection Device for insulin         amylase-lipase-protease  3 capsule Oral TID w/meals     cloNIDine  0.1 mg Oral Daily     docusate sodium  100 mg Oral BID     folic acid  1 mg Oral Daily     gabapentin  300 mg Oral TID     insulin aspart  0.5-2.5 Units Subcutaneous TID AC     insulin aspart  0.5-2.5 Units Subcutaneous At Bedtime     levETIRAcetam  2,000 mg Oral BID     metFORMIN  2,000 mg Oral Daily with supper     multivitamin w/minerals  1 tablet Oral Daily     pantoprazole  40 mg Oral BID AC     polyethylene glycol  17 g Oral Daily     sodium chloride (PF)  3 mL Intracatheter Q8H     tamsulosin  0.4 mg Oral Daily     thiamine  100 mg Oral Daily

## 2022-12-19 NOTE — PROGRESS NOTES
[facility]  WOC Nurse Inpatient Assessment     Consulted for: Left ear    Patient History (according to provider note(s):      Patient is a 61 y/o man who has multiple medical problems including alcohol dependence, alcoholic pancreatitis, chronic pancreatitis, past pancreatic stents, seizure disorder, COPD, hypokalemia and microcytic anemia. Patient presented with concerns about seizure activity. Patient was found to have alcohol withdrawal and alcoholic ketoacidosis.     Areas Assessed:      Areas visualized during today's visit: Face and neck    Pressure Injury Location: left ear      Last photo: 12/8/22  Wound type: Pressure Injury     Pressure Injury Stage: 2, hospital acquired      This is a Medical Device Related Pressure Injury (MDRPI) due to oxygen tubing  Wound history/plan of care:   Noted on skin assessment during skin study. Pt denied pain    Wound base: 100 % epidermis  STATUS: healed    Treatment Plan:     Left ear wound(s): Wound healed. If oxygen is needed in the future, pad tubing with foam.     Orders: Updated    RECOMMEND PRIMARY TEAM ORDER: None, at this time  Education provided: importance of repositioning, plan of care and wound progress  Discussed plan of care with: Patient and Nurse  WOC nurse follow-up plan: signing off  Notify WOC if wound(s) deteriorate.  Nursing to notify the Provider(s) and re-consult the WOC Nurse if new skin concern.    DATA:     Current support surface: Standard  Standard gel/foam mattress (IsoFlex, Atmos air, etc)  Containment of urine/stool: Continent of bladder and Continent of bowel  BMI: Body mass index is 24.23 kg/m .   Active diet order: Orders Placed This Encounter      Combination Diet Regular Diet Adult     Output: I/O last 3 completed shifts:  In: 400 [P.O.:400]  Out: 250 [Urine:250]     Labs:   Recent Labs   Lab 12/16/22  0621   ALBUMIN 2.6*   HGB 8.0*   WBC 5.9     Pressure injury risk assessment:   Sensory  Perception: 3-->slightly limited  Moisture: 4-->rarely moist  Activity: 3-->walks occasionally  Mobility: 3-->slightly limited  Nutrition: 3-->adequate  Friction and Shear: 3-->no apparent problem  Kwesi Score: 19    Sandra Villanueva RN CWOCN  Dept. Pager: [phone]  Dept. Office Number: [phone]

## 2022-12-20 ENCOUNTER — APPOINTMENT (OUTPATIENT)
Dept: PHYSICAL THERAPY | Facility: CLINIC | Age: 62
End: 2022-12-20
Payer: COMMERCIAL

## 2022-12-20 ENCOUNTER — APPOINTMENT (OUTPATIENT)
Dept: OCCUPATIONAL THERAPY | Facility: CLINIC | Age: 62
End: 2022-12-20
Payer: COMMERCIAL

## 2022-12-20 LAB
ALBUMIN SERPL-MCNC: 2.9 G/DL (ref 3.4–5)
ALP SERPL-CCNC: 95 U/L (ref 40–150)
ALT SERPL W P-5'-P-CCNC: 44 U/L (ref 0–70)
ANION GAP SERPL CALCULATED.3IONS-SCNC: 10 MMOL/L (ref 3–14)
AST SERPL W P-5'-P-CCNC: 73 U/L (ref 0–45)
BASOPHILS # BLD AUTO: 0.2 10E3/UL (ref 0–0.2)
BASOPHILS NFR BLD AUTO: 2 %
BILIRUB SERPL-MCNC: 0.3 MG/DL (ref 0.2–1.3)
BUN SERPL-MCNC: 13 MG/DL (ref 7–30)
CALCIUM SERPL-MCNC: 9.6 MG/DL (ref 8.5–10.1)
CHLORIDE BLD-SCNC: 103 MMOL/L (ref 94–109)
CO2 SERPL-SCNC: 23 MMOL/L (ref 20–32)
CREAT SERPL-MCNC: 0.55 MG/DL (ref 0.66–1.25)
EOSINOPHIL # BLD AUTO: 0.3 10E3/UL (ref 0–0.7)
EOSINOPHIL NFR BLD AUTO: 3 %
ERYTHROCYTE [DISTWIDTH] IN BLOOD BY AUTOMATED COUNT: 21.5 % (ref 10–15)
GFR SERPL CREATININE-BSD FRML MDRD: >90 ML/MIN/1.73M2
GLUCOSE BLD-MCNC: 142 MG/DL (ref 70–99)
GLUCOSE BLDC GLUCOMTR-MCNC: 138 MG/DL (ref 70–99)
GLUCOSE BLDC GLUCOMTR-MCNC: 139 MG/DL (ref 70–99)
GLUCOSE BLDC GLUCOMTR-MCNC: 155 MG/DL (ref 70–99)
GLUCOSE BLDC GLUCOMTR-MCNC: 214 MG/DL (ref 70–99)
GLUCOSE BLDC GLUCOMTR-MCNC: 237 MG/DL (ref 70–99)
HCT VFR BLD AUTO: 29.2 % (ref 40–53)
HGB BLD-MCNC: 9 G/DL (ref 13.3–17.7)
IMM GRANULOCYTES # BLD: 0.1 10E3/UL
IMM GRANULOCYTES NFR BLD: 1 %
LYMPHOCYTES # BLD AUTO: 2.1 10E3/UL (ref 0.8–5.3)
LYMPHOCYTES NFR BLD AUTO: 24 %
MCH RBC QN AUTO: 22.1 PG (ref 26.5–33)
MCHC RBC AUTO-ENTMCNC: 30.8 G/DL (ref 31.5–36.5)
MCV RBC AUTO: 72 FL (ref 78–100)
MONOCYTES # BLD AUTO: 1 10E3/UL (ref 0–1.3)
MONOCYTES NFR BLD AUTO: 11 %
NEUTROPHILS # BLD AUTO: 5.5 10E3/UL (ref 1.6–8.3)
NEUTROPHILS NFR BLD AUTO: 59 %
NRBC # BLD AUTO: 0 10E3/UL
NRBC BLD AUTO-RTO: 0 /100
PLATELET # BLD AUTO: 381 10E3/UL (ref 150–450)
POTASSIUM BLD-SCNC: 4.1 MMOL/L (ref 3.4–5.3)
PROT SERPL-MCNC: 8.4 G/DL (ref 6.8–8.8)
RBC # BLD AUTO: 4.07 10E6/UL (ref 4.4–5.9)
SODIUM SERPL-SCNC: 136 MMOL/L (ref 133–144)
WBC # BLD AUTO: 9.1 10E3/UL (ref 4–11)

## 2022-12-20 PROCEDURE — 120N000002 HC R&B MED SURG/OB UMMC

## 2022-12-20 PROCEDURE — 36415 COLL VENOUS BLD VENIPUNCTURE: CPT | Performed by: INTERNAL MEDICINE

## 2022-12-20 PROCEDURE — 250N000013 HC RX MED GY IP 250 OP 250 PS 637: Performed by: INTERNAL MEDICINE

## 2022-12-20 PROCEDURE — 97112 NEUROMUSCULAR REEDUCATION: CPT | Mod: GP

## 2022-12-20 PROCEDURE — 85025 COMPLETE CBC W/AUTO DIFF WBC: CPT | Performed by: INTERNAL MEDICINE

## 2022-12-20 PROCEDURE — 97129 THER IVNTJ 1ST 15 MIN: CPT | Mod: GO | Performed by: OCCUPATIONAL THERAPIST

## 2022-12-20 PROCEDURE — 97116 GAIT TRAINING THERAPY: CPT | Mod: GP

## 2022-12-20 PROCEDURE — 80053 COMPREHEN METABOLIC PANEL: CPT | Performed by: INTERNAL MEDICINE

## 2022-12-20 PROCEDURE — 99232 SBSQ HOSP IP/OBS MODERATE 35: CPT | Performed by: INTERNAL MEDICINE

## 2022-12-20 PROCEDURE — 97535 SELF CARE MNGMENT TRAINING: CPT | Mod: GO | Performed by: OCCUPATIONAL THERAPIST

## 2022-12-20 RX ADMIN — LEVETIRACETAM 2000 MG: 500 TABLET, FILM COATED ORAL at 20:56

## 2022-12-20 RX ADMIN — PANCRELIPASE 3 CAPSULE: 60000; 12000; 38000 CAPSULE, DELAYED RELEASE PELLETS ORAL at 11:42

## 2022-12-20 RX ADMIN — GABAPENTIN 300 MG: 300 CAPSULE ORAL at 14:20

## 2022-12-20 RX ADMIN — ACETAMINOPHEN 650 MG: 325 TABLET, FILM COATED ORAL at 09:19

## 2022-12-20 RX ADMIN — PANCRELIPASE 3 CAPSULE: 24000; 76000; 120000 CAPSULE, DELAYED RELEASE PELLETS ORAL at 18:33

## 2022-12-20 RX ADMIN — Medication 1 MG: at 18:32

## 2022-12-20 RX ADMIN — LEVETIRACETAM 2000 MG: 500 TABLET, FILM COATED ORAL at 09:12

## 2022-12-20 RX ADMIN — METFORMIN HYDROCHLORIDE 2000 MG: 500 TABLET, EXTENDED RELEASE ORAL at 17:25

## 2022-12-20 RX ADMIN — INSULIN ASPART 1 UNITS: 100 INJECTION, SOLUTION INTRAVENOUS; SUBCUTANEOUS at 12:15

## 2022-12-20 RX ADMIN — FOLIC ACID 1 MG: 1 TABLET ORAL at 09:14

## 2022-12-20 RX ADMIN — Medication 1 TABLET: at 09:13

## 2022-12-20 RX ADMIN — TAMSULOSIN HYDROCHLORIDE 0.4 MG: 0.4 CAPSULE ORAL at 09:14

## 2022-12-20 RX ADMIN — PANTOPRAZOLE SODIUM 40 MG: 40 TABLET, DELAYED RELEASE ORAL at 17:19

## 2022-12-20 RX ADMIN — THIAMINE HCL TAB 100 MG 100 MG: 100 TAB at 09:14

## 2022-12-20 RX ADMIN — ACETAMINOPHEN 650 MG: 325 TABLET, FILM COATED ORAL at 17:25

## 2022-12-20 RX ADMIN — PANCRELIPASE 3 CAPSULE: 60000; 12000; 38000 CAPSULE, DELAYED RELEASE PELLETS ORAL at 09:18

## 2022-12-20 RX ADMIN — DOCUSATE SODIUM 100 MG: 100 CAPSULE, LIQUID FILLED ORAL at 20:56

## 2022-12-20 RX ADMIN — INSULIN ASPART 0.5 UNITS: 100 INJECTION, SOLUTION INTRAVENOUS; SUBCUTANEOUS at 17:29

## 2022-12-20 RX ADMIN — GABAPENTIN 300 MG: 300 CAPSULE ORAL at 20:56

## 2022-12-20 RX ADMIN — GABAPENTIN 300 MG: 300 CAPSULE ORAL at 09:14

## 2022-12-20 RX ADMIN — PANTOPRAZOLE SODIUM 40 MG: 40 TABLET, DELAYED RELEASE ORAL at 09:13

## 2022-12-20 RX ADMIN — CLONIDINE HYDROCHLORIDE 0.1 MG: 0.1 TABLET ORAL at 09:14

## 2022-12-20 ASSESSMENT — ACTIVITIES OF DAILY LIVING (ADL)
ADLS_ACUITY_SCORE: 23
ADLS_ACUITY_SCORE: 27
ADLS_ACUITY_SCORE: 23
ADLS_ACUITY_SCORE: 27
ADLS_ACUITY_SCORE: 23
ADLS_ACUITY_SCORE: 27

## 2022-12-20 NOTE — PLAN OF CARE
"/58 (BP Location: Left arm)   Pulse 79   Temp 99.2  F (37.3  C) (Oral)   Resp 14   Ht 1.778 m (5' 10\")   Wt 76.6 kg (168 lb 14.4 oz)   SpO2 95%   BMI 24.23 kg/m    RA    No acute changes this shift. A&Ox4. Baseline numbness in all extremities. C/o generalized extremity pain. PRN tylenol given per MAR. Voids spontaneously. SBA with walker. Mepilex on elbow and knee - CDI. Dressing changed prior shift per report. Bed alarm on for safety. Patient calls appropriately.     Continue to monitor. Pending placement.   "
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
## 2022-12-20 NOTE — PROGRESS NOTES
CLINICAL NUTRITION SERVICES - REASSESSMENT NOTE     Nutrition Prescription    RECOMMENDATIONS FOR MDs/PROVIDERS TO ORDER:  Patient reporting loose stooling w/ abdominal discomfort nearly immediately after eating, would recommend: Creon24 (3 capsules) with meals or Creon12(6 capsules) with meals.     Malnutrition Status:    Moderate malnutrition in the context of acute on chronic disease.     Recommendations already ordered by Registered Dietitian (RD):  Encouraged oral intakes.     Future/Additional Recommendations:  Monitor appetite/PO intake, GI symptoms, stooling.      EVALUATION OF THE PROGRESS TOWARD GOALS   Diet: Regular, room service w/ assist   Ensure: Strawberry Ensure at breakfast and dinner   Intake: 75% + snacks     NEW FINDINGS   Per chart review, patient has completed etoh withdrawal and his appetite has been improving. He has been tolerating meals and requesting additional snacks. Per HT, he is ordering 3 nutritionally adequate meals daily.     Visited with patient at bedside. He reports a variable appetite, he feels hungry, and really likes the food, but struggles to eat. He eats more than 50%, but less than 75% of his meals most days. He endorses nausea, though he has not thrown up in a while. He does occasionally request Zofran before he eats, which helps some. He also endorses loose stools almost immediately after eating, he feels cramping and abdominal discomfort when this happens. Patient is drinking the Ensure he receives, we discussed the importance of drinking the BID ensure to optimize nutrition status.     Weights:  12/14/22 1220 76.6 kg (168 lb 14.4 oz) Standing scale   12/06/22 2110 75.3 kg (166 lb 0.1 oz)    Patient weight trending up, gained 1.7% in 2 weeks.    Labs and meds reviewed.     MALNUTRITION  % Intake: < 75% for > 7 days (moderate)  % Weight Loss: None noted  Subcutaneous Fat Loss: None observed  Muscle Loss: Temporal:  mild, Facial & jaw region:  mild and Thoracic region  (clavicle, acromium bone, deltoid, trapezius, pectoral):  mild  Fluid Accumulation/Edema: None noted  Malnutrition Diagnosis: Moderate malnutrition in the context of acute on chronic disease.     Previous Goals   Patient to consume % of nutritionally adequate meal trays TID, or the equivalent with supplements/snacks.  Evaluation: Not met    Previous Nutrition Diagnosis  Predicted inadequate nutrient intake related to appetite vs substance (ETOH) abuse   Evaluation: No change    CURRENT NUTRITION DIAGNOSIS  Predicted inadequate nutrient intake related to appetite vs substance (ETOH) abuse.       INTERVENTIONS  Implementation  Collaboration with other providers.  Encouraged oral intakes.     Goals  Patient to consume % of nutritionally adequate meal trays TID, or the equivalent with supplements/snacks.    Monitoring/Evaluation  Progress toward goals will be monitored and evaluated per protocol.    Jamilah Espinal, MPH, RD, LD  5B RD pager: [phone]  Weekend/holiday pager: [phone]

## 2022-12-20 NOTE — PLAN OF CARE
Patient VSS; A&O x 4  At beginning of shift patient stated 9/10 pain; received Tylenol  mg; patient stated pain level decreased to 7/10. Writer encouraged resting and distraction; patient obliged and stated he is feeling better. Patient tolerated his medications well. Patient morning  for breakfast no insulin required; lunch time , received 1 unit of insulin. Patient saw OT please their note.

## 2022-12-20 NOTE — PLAN OF CARE
Alert and Ox 4, left eye blind.     VSS, on RA.     Bed alarm on for the safety.     Moderate pain at knee, extremities, managed with PRN Tylenol with some relief.   Ax 1 with Gb and walker to transfer, steady gait,     Good appetite, BG checked before meals with sliding scale.   Numbness at all extremities.     Wound at R elbow, L keen, R fingers dressing are changed this shift.     Cont of B/B, had Bm today.   Cont of POC.

## 2022-12-21 ENCOUNTER — APPOINTMENT (OUTPATIENT)
Dept: PHYSICAL THERAPY | Facility: CLINIC | Age: 62
End: 2022-12-21
Payer: COMMERCIAL

## 2022-12-21 LAB
CREAT SERPL-MCNC: 0.54 MG/DL (ref 0.66–1.25)
GFR SERPL CREATININE-BSD FRML MDRD: >90 ML/MIN/1.73M2
GLUCOSE BLDC GLUCOMTR-MCNC: 164 MG/DL (ref 70–99)
GLUCOSE BLDC GLUCOMTR-MCNC: 177 MG/DL (ref 70–99)
GLUCOSE BLDC GLUCOMTR-MCNC: 179 MG/DL (ref 70–99)
GLUCOSE BLDC GLUCOMTR-MCNC: 185 MG/DL (ref 70–99)
GLUCOSE BLDC GLUCOMTR-MCNC: 189 MG/DL (ref 70–99)
GLUCOSE BLDC GLUCOMTR-MCNC: 202 MG/DL (ref 70–99)
GLUCOSE BLDC GLUCOMTR-MCNC: 215 MG/DL (ref 70–99)
PLATELET # BLD AUTO: 388 10E3/UL (ref 150–450)

## 2022-12-21 PROCEDURE — 97110 THERAPEUTIC EXERCISES: CPT | Mod: GP

## 2022-12-21 PROCEDURE — 99232 SBSQ HOSP IP/OBS MODERATE 35: CPT | Performed by: INTERNAL MEDICINE

## 2022-12-21 PROCEDURE — 85049 AUTOMATED PLATELET COUNT: CPT | Performed by: INTERNAL MEDICINE

## 2022-12-21 PROCEDURE — 250N000012 HC RX MED GY IP 250 OP 636 PS 637: Performed by: INTERNAL MEDICINE

## 2022-12-21 PROCEDURE — 250N000013 HC RX MED GY IP 250 OP 250 PS 637: Performed by: INTERNAL MEDICINE

## 2022-12-21 PROCEDURE — 120N000002 HC R&B MED SURG/OB UMMC

## 2022-12-21 PROCEDURE — 36415 COLL VENOUS BLD VENIPUNCTURE: CPT | Performed by: INTERNAL MEDICINE

## 2022-12-21 PROCEDURE — 82565 ASSAY OF CREATININE: CPT | Performed by: INTERNAL MEDICINE

## 2022-12-21 PROCEDURE — 97116 GAIT TRAINING THERAPY: CPT | Mod: GP

## 2022-12-21 RX ORDER — CLONIDINE HYDROCHLORIDE 0.1 MG/1
0.1 TABLET ORAL DAILY
Status: COMPLETED | OUTPATIENT
Start: 2022-12-22 | End: 2022-12-22

## 2022-12-21 RX ADMIN — INSULIN ASPART 0.5 UNITS: 100 INJECTION, SOLUTION INTRAVENOUS; SUBCUTANEOUS at 17:58

## 2022-12-21 RX ADMIN — INSULIN ASPART 0.5 UNITS: 100 INJECTION, SOLUTION INTRAVENOUS; SUBCUTANEOUS at 22:40

## 2022-12-21 RX ADMIN — PANTOPRAZOLE SODIUM 40 MG: 40 TABLET, DELAYED RELEASE ORAL at 07:52

## 2022-12-21 RX ADMIN — TAMSULOSIN HYDROCHLORIDE 0.4 MG: 0.4 CAPSULE ORAL at 07:53

## 2022-12-21 RX ADMIN — GABAPENTIN 300 MG: 300 CAPSULE ORAL at 07:52

## 2022-12-21 RX ADMIN — ACETAMINOPHEN 650 MG: 325 TABLET, FILM COATED ORAL at 02:25

## 2022-12-21 RX ADMIN — PANCRELIPASE 3 CAPSULE: 24000; 76000; 120000 CAPSULE, DELAYED RELEASE PELLETS ORAL at 17:59

## 2022-12-21 RX ADMIN — DOCUSATE SODIUM 100 MG: 100 CAPSULE, LIQUID FILLED ORAL at 07:53

## 2022-12-21 RX ADMIN — CLONIDINE HYDROCHLORIDE 0.1 MG: 0.1 TABLET ORAL at 07:52

## 2022-12-21 RX ADMIN — LEVETIRACETAM 2000 MG: 500 TABLET, FILM COATED ORAL at 21:20

## 2022-12-21 RX ADMIN — ACETAMINOPHEN 650 MG: 325 TABLET, FILM COATED ORAL at 21:42

## 2022-12-21 RX ADMIN — Medication 1 TABLET: at 07:52

## 2022-12-21 RX ADMIN — PANCRELIPASE 3 CAPSULE: 24000; 76000; 120000 CAPSULE, DELAYED RELEASE PELLETS ORAL at 12:24

## 2022-12-21 RX ADMIN — PANTOPRAZOLE SODIUM 40 MG: 40 TABLET, DELAYED RELEASE ORAL at 16:57

## 2022-12-21 RX ADMIN — THIAMINE HCL TAB 100 MG 100 MG: 100 TAB at 07:53

## 2022-12-21 RX ADMIN — INSULIN ASPART 0.5 UNITS: 100 INJECTION, SOLUTION INTRAVENOUS; SUBCUTANEOUS at 12:24

## 2022-12-21 RX ADMIN — FOLIC ACID 1 MG: 1 TABLET ORAL at 07:52

## 2022-12-21 RX ADMIN — LEVETIRACETAM 2000 MG: 500 TABLET, FILM COATED ORAL at 07:51

## 2022-12-21 RX ADMIN — DOCUSATE SODIUM 100 MG: 100 CAPSULE, LIQUID FILLED ORAL at 21:21

## 2022-12-21 RX ADMIN — GABAPENTIN 300 MG: 300 CAPSULE ORAL at 14:53

## 2022-12-21 RX ADMIN — METFORMIN HYDROCHLORIDE 2000 MG: 500 TABLET, EXTENDED RELEASE ORAL at 16:55

## 2022-12-21 RX ADMIN — Medication 1 MG: at 21:42

## 2022-12-21 RX ADMIN — ACETAMINOPHEN 650 MG: 325 TABLET, FILM COATED ORAL at 07:57

## 2022-12-21 RX ADMIN — GABAPENTIN 300 MG: 300 CAPSULE ORAL at 21:21

## 2022-12-21 RX ADMIN — PANCRELIPASE 3 CAPSULE: 24000; 76000; 120000 CAPSULE, DELAYED RELEASE PELLETS ORAL at 07:53

## 2022-12-21 ASSESSMENT — ACTIVITIES OF DAILY LIVING (ADL)
ADLS_ACUITY_SCORE: 23

## 2022-12-21 NOTE — PROGRESS NOTES
[facility]    Medicine Progress Note - Hospitalist Service, GOLD TEAM 18    Date of Admission:  12/6/2022    Assessment & Plan    Patient is a 61 y/o man who has multiple medical problems including alcohol dependence, alcoholic pancreatitis, chronic pancreatitis, past pancreatic stents, seizure disorder, COPD, hypokalemia and microcytic anemia. Patient presented with concerns about seizure activity. Patient was found to have alcohol withdrawal and alcoholic ketoacidosis, both of which appear resolved. Patient is pending placement.       #Seizure disorder  -- Patient on seizure precautions for now.  -- Patient back on Keppra       #Hypoxemia of unclear cause, patient now on room air  -- Monitoring for additional symptoms  -- Monitoring respiratory status.      #Generalized weakness  -- PT/OT     #Peripheral neuropathy  -- Patient back on gabapentin.     #Alcohol dependence with alcohol withdrawal; withdrawal appears resolve  -- Monitoring for changes  -- Continue to down-titrate clonidine     #Alcoholic ketoacidosis, appears resolved  -- No active intervention indicated at present     #Esophagitis  -- Protonix 40 mg PO BID    #Chronic pancreatitis  -- Patient on Creon, dose increased 12/20    --history pancreatic duct stent  Later removed      #Generalized abdominal discomfort of unclear cause  -- Acetaminophen as needed. Monitoring for additional symptoms.  - LFT  AST slightly increased , monitor bilirubin ALT normal      #Left lower lobe nodule  -- CT chest in 3 months as outpatient.     #COPD  -- Patient compensated  -- Monitoring for changes.     #Blindness  -- Patient has history of left eye enucleation and patient has poor vision in right eye  -- Monitoring for safety.     #T2DM; patient listed as being on metformin as outpatient  -- Serum glucose suboptimally controlled  --- blood sugar 130s to 200s   -- back on metformin 2000 mg with supper, consider reducing if  has ongoing diarrhea   -- Patient on insulin sliding scale.  HgA1c  6.7 on 12/6       #Microcytic anemia; iron deficiency anemia  -- Anticipate that patient will eventually be on supplemental iron as outpatient   Patient would benefit from colonoscopy as outpatient.     #Peripheral edema  -- Monitoring for additional symptoms.    #Hypokalemia, hypomagnesemia, hypophosphatemia  -- Supplement as needed.     #Dysuria  -- Patient had negative urinalysis and did not appear to have urinary retention  -- Monitoring for additional symptoms.     #Constipation  -- Bowel regimen.      #Left ear stage 2 pressure injury, hospital acquired  -- Wound care.    #Covid-19 exposure; PCR was negative  -- Monitoring for symptoms  -- If patient has symptoms suggestive of Covid-19 infection, PCR will be repeated.          Diet: Combination Diet Regular Diet Adult  Snacks/Supplements Adult: Ensure Enlive; With Meals  Room Service    DVT Prophylaxis: ambulate   Trejo Catheter: Not present  Central Lines: None  Cardiac Monitoring: None  Code Status: Full Code      Disposition Plan   Awaiting placement      The patient's care was discussed with the care team  .    Charito Brewster MD  Hospitalist Service, [address]  Securely message with the Vocera Web Console (learn more here)  Text page via [facility] Paging/Directory   Please see signed in provider for up to date coverage information      Clinically Significant Risk Factors           # Hypercalcemia: corrected calcium is >10.1, will monitor as appropriate    # Hypoalbuminemia: Lowest albumin = 2.5 g/dL at 12/9/2022  7:10 AM, will monitor as appropriate          # DMII: A1C = 6.7 % (Ref range: <5.7 %) within past 3 months    # Moderate Malnutrition: based on nutrition assessment        ______________________________________________________________________    Interval History      Has no complaints, tries to eat more , when asks admits  to getting nauseated after eating no emesis, denies abdominal pain unless I press over abdomen        Data reviewed today: I reviewed all medications, new labs and imaging results over the last 24 hours.   Physical Exam   Vital Signs: Temp: 98.9  F (37.2  C) Temp src: Oral BP: 113/69 Pulse: 82   Resp: 16 SpO2: 95 % O2 Device: None (Room air)    Weight: 168 lbs 14.4 oz   General appearence: awake alert  In no apparent distress    HEENT: EOMI, PEARLA, sclera nonicteric,  moist,  mucus membranes,   NECK : supple  RESPIRATORY: lungs clear to auscultation bilateral,    CARDIOVASCULAR:S1 S2 regular rate and rhythm, no rubs gallops or murmurs appreciated  GASTROINTESTINAL:soft, non-distended has generalized tenderness worse in upper abdomen, no rebound or guarding, fullness in ruq  + bowel sounds   SKIN: warm and dry, no mottling noted   NEUROLOGIC; awake alert and oriented  EXTREMITIES: no clubbing, cyanosis or edema , moves all extremity, good pedal pulses   MUSCULOSKELETAL: without deformity       Data   Recent Labs   Lab 12/20/22  1646 12/20/22  1113 12/20/22  0654 12/20/22  0542 12/18/22  0715 12/18/22  0701 12/16/22  0808 12/16/22  0621 12/15/22  1233 12/15/22  0856   WBC  --   --   --  9.1  --   --   --  5.9  --   --    HGB  --   --   --  9.0*  --   --   --  8.0*  --   --    MCV  --   --   --  72*  --   --   --  72*  --   --    PLT  --   --   --  381  --  298  --  245  --  213   NA  --   --   --  136  --   --   --  132*  --   --    POTASSIUM  --   --   --  4.1  --   --   --  3.7  --  4.0   CHLORIDE  --   --   --  103  --   --   --  103  --   --    CO2  --   --   --  23  --   --   --  25  --   --    BUN  --   --   --  13  --   --   --  12  --   --    CR  --   --   --  0.55*  --  0.51*  --  0.51*  --  0.51*   ANIONGAP  --   --   --  10  --   --   --  4  --   --    RASHMI  --   --   --  9.6  --   --   --  8.6  --   --    * 237* 138* 142*   < >  --    < > 271*   < >  --    ALBUMIN  --   --   --  2.9*  --   --   --   2.6*  --   --    PROTTOTAL  --   --   --  8.4  --   --   --  7.6  --   --    BILITOTAL  --   --   --  0.3  --   --   --  0.3  --   --    ALKPHOS  --   --   --  95  --   --   --  78  --   --    ALT  --   --   --  44  --   --   --  33  --   --    AST  --   --   --  73*  --   --   --  46*  --   --     < > = values in this interval not displayed.     No results found for this or any previous visit (from the past 24 hour(s)).

## 2022-12-21 NOTE — PLAN OF CARE
Goal Outcome Evaluation:    End of shift Summary: See flowsheet for VS and detail assessments.    Changes this Shift:     Pulmonary: Lung sound clear bilateral. Denied SOB and cough.    Output: bedside urinal     Activity: walker with assistance 1     Skin: Scrape on pt lips. Wound on his hands.    Pain: Chronic headache manage with tylenol.    Neuro/CMS: Left eye prostatic. A&OX4 Pt is on seizure precaution.     Dressing: Wound on his knuckles covered with bandage.    IV/Drains: PIV saline locked.    BG check sliding scale. Bedtime insulin is unavailable massaged pharmacy for a new pen.    Plan: waiting for placement.

## 2022-12-21 NOTE — PROGRESS NOTES
[facility]    Medicine Progress Note - Hospitalist Service, GOLD TEAM 18    Date of Admission:  12/6/2022    Assessment & Plan    Patient is a 61 y/o man who has multiple medical problems including alcohol dependence, alcoholic pancreatitis, chronic pancreatitis, past pancreatic stents, seizure disorder, COPD, hypokalemia and microcytic anemia. Patient presented with concerns about seizure activity. Patient was found to have alcohol withdrawal and alcoholic ketoacidosis, both of which appear resolved. Patient is pending placement.       #Seizure disorder  -- Patient on seizure precautions for now.  -- Patient back on Keppra     #Hypoxemia of unclear cause, patient now on room air  -- Monitoring for additional symptoms  -- Monitoring respiratory status.      #Peripheral neuropathy  -- Patient back on gabapentin.     #Alcohol dependence with alcohol withdrawal; withdrawal appears resolve  -- Monitoring for changes  -- titrated off clonidine ( should be done  12/22 )      #Alcoholic ketoacidosis, appears resolved  -- No active intervention indicated at present     #Esophagitis  -- Protonix 40 mg PO BID    #Chronic pancreatitis  -- Patient on Creon, dose increased 12/20    --history pancreatic duct stent  Later removed      Ongoing nausea, no emesis  - shemar has been having nausea for past month or so, can get nauseated without eating , thsi is new for kain,   - will  Ask GI for input       #Generalized abdominal discomfort of unclear cause  -- Acetaminophen as needed. Monitoring for additional symptoms.  - LFT  AST slightly increased , monitor bilirubin ALT normal   - CT abdomen 12/22 with multiple pancreatic calcifications . Cholelithiasis without acute cholecystitis , hepatomegaly . Possible cyst vs hemangioma      Possible esophagitis   - per CT12/22  has circumferential wall thickening, ? If needs EGD, asking for GI input       #Left lower lobe nodule  -- CT chest in 3  months as outpatient.     #COPD  -- Patient compensated  -- Monitoring for changes.     #Blindness  -- Patient has history of left eye enucleation and patient has poor vision in right eye  -- Monitoring for safety.     #T2DM; patient listed as being on metformin as outpatient  -- Serum glucose suboptimally controlled  --- blood sugar 130s to 200s   -- back on metformin 2000 mg with supper, consider reducing if has ongoing diarrhea   -- Patient on insulin sliding scale.  HgA1c  6.7 on 12/6       #Microcytic anemia; iron deficiency anemia  -- Anticipate that patient will eventually be on supplemental iron as outpatient   Patient would benefit from colonoscopy as outpatient.     #Peripheral edema  -- Monitoring for additional symptoms.    #Hypokalemia, hypomagnesemia, hypophosphatemia  -- Supplement as needed.     #Dysuria  -- Patient had negative urinalysis and did not appear to have urinary retention  -- Monitoring for additional symptoms.     #Constipation  -- Bowel regimen.      #Left ear stage 2 pressure injury, hospital acquired  -- Wound care.    #Generalized weakness  -- PT/OT    #Covid-19 exposure; PCR was negative  -- Monitoring for symptoms    States he had COVID ~ 4 months ( actually has had + test 6/2022)  ago and has not been the same since , follow up  With long COVID clinic           Diet: Combination Diet Regular Diet Adult  Snacks/Supplements Adult: Ensure Enlive; With Meals  Room Service    DVT Prophylaxis: ambulate   Trejo Catheter: Not present  Central Lines: None  Cardiac Monitoring: None  Code Status: Full Code      Disposition Plan   Awaiting placement      The patient's care was discussed with the care team  .    Charito Brewster MD  Hospitalist Service, GOLD TEAM [address] [place] [address]  Securely message with the Vocera Web Console (learn more here)  Text page via [facility] Paging/Directory   Please see signed in provider for up to date coverage  information      Clinically Significant Risk Factors           # Hypercalcemia: corrected calcium is >10.1, will monitor as appropriate    # Hypoalbuminemia: Lowest albumin = 2.5 g/dL at 12/9/2022  7:10 AM, will monitor as appropriate          # DMII: A1C = 6.7 % (Ref range: <5.7 %) within past 3 months    # Moderate Malnutrition: based on nutrition assessment        ______________________________________________________________________    Interval History    States he feel better today,  Has ongoing nausea no emesis, ongoing abdominal pain          Data reviewed today: I reviewed all medications, new labs and imaging results over the last 24 hours.   Physical Exam   Vital Signs: Temp: 98.1  F (36.7  C) Temp src: Oral BP: 115/68 Pulse: 76   Resp: 16 SpO2: 98 % O2 Device: None (Room air)    Weight: 168 lbs 14.4 oz   General appearence: awake alert  In no apparent distress    HEENT: EOMI, PEARLA, sclera nonicteric,  moist,  mucus membranes,   NECK : supple  RESPIRATORY: lungs clear to auscultation bilateral,    CARDIOVASCULAR:S1 S2 regular rate and rhythm, no rubs gallops or murmurs appreciated  GASTROINTESTINAL:soft, non-distended has generalized tenderness worse in upper abdomen, no rebound or guarding, fullness in ruq  + bowel sounds   SKIN: warm and dry, no mottling noted   NEUROLOGIC; awake alert and oriented  EXTREMITIES: no clubbing, cyanosis or edema , moves all extremity, good pedal pulses   MUSCULOSKELETAL: without deformity       Data   Recent Labs   Lab 12/21/22  1652 12/21/22  1151 12/21/22  0746 12/21/22  0729 12/20/22  0654 12/20/22  0542 12/18/22  0715 12/18/22  0701 12/16/22  0808 12/16/22  0621 12/15/22  1233 12/15/22  0856   WBC  --   --   --   --   --  9.1  --   --   --  5.9  --   --    HGB  --   --   --   --   --  9.0*  --   --   --  8.0*  --   --    MCV  --   --   --   --   --  72*  --   --   --  72*  --   --    PLT  --   --  388  --   --  381  --  298  --  245  --  213   NA  --   --   --   --    --  136  --   --   --  132*  --   --    POTASSIUM  --   --   --   --   --  4.1  --   --   --  3.7  --  4.0   CHLORIDE  --   --   --   --   --  103  --   --   --  103  --   --    CO2  --   --   --   --   --  23  --   --   --  25  --   --    BUN  --   --   --   --   --  13  --   --   --  12  --   --    CR  --   --  0.54*  --   --  0.55*  --  0.51*  --  0.51*  --  0.51*   ANIONGAP  --   --   --   --   --  10  --   --   --  4  --   --    RASHMI  --   --   --   --   --  9.6  --   --   --  8.6  --   --    * 202*  --  177*   < > 142*   < >  --    < > 271*   < >  --    ALBUMIN  --   --   --   --   --  2.9*  --   --   --  2.6*  --   --    PROTTOTAL  --   --   --   --   --  8.4  --   --   --  7.6  --   --    BILITOTAL  --   --   --   --   --  0.3  --   --   --  0.3  --   --    ALKPHOS  --   --   --   --   --  95  --   --   --  78  --   --    ALT  --   --   --   --   --  44  --   --   --  33  --   --    AST  --   --   --   --   --  73*  --   --   --  46*  --   --     < > = values in this interval not displayed.     No results found for this or any previous visit (from the past 24 hour(s)).

## 2022-12-21 NOTE — PLAN OF CARE
"/55 (BP Location: Left arm)   Pulse 79   Temp 97.7  F (36.5  C) (Oral)   Resp 14   Ht 1.778 m (5' 10\")   Wt 76.6 kg (168 lb 14.4 oz)   SpO2 98%   BMI 24.23 kg/m      No significant changes this shift. Pt comfortably sleeping. Prn tylenol given for headache. Voiding okay, urinal at bedside. VSS, RA, LS clear, encouraged IS usage. Alert and oriented x4, baseline Numbness BLE BUE. Dressing changed R elbow and L knee. Pt's  and 185, pt did not receive bedtime insulin, provider aware. Ambulated to BR with SBA and walker. Continue POC.       "

## 2022-12-22 ENCOUNTER — TELEPHONE (OUTPATIENT)
Dept: GASTROENTEROLOGY | Facility: CLINIC | Age: 62
End: 2022-12-22

## 2022-12-22 ENCOUNTER — APPOINTMENT (OUTPATIENT)
Dept: PHYSICAL THERAPY | Facility: CLINIC | Age: 62
End: 2022-12-22
Payer: COMMERCIAL

## 2022-12-22 LAB
ALBUMIN SERPL-MCNC: 2.7 G/DL (ref 3.4–5)
ALP SERPL-CCNC: 79 U/L (ref 40–150)
ALT SERPL W P-5'-P-CCNC: 39 U/L (ref 0–70)
AST SERPL W P-5'-P-CCNC: 63 U/L (ref 0–45)
BILIRUB DIRECT SERPL-MCNC: 0.1 MG/DL (ref 0–0.2)
BILIRUB SERPL-MCNC: 0.3 MG/DL (ref 0.2–1.3)
GLUCOSE BLDC GLUCOMTR-MCNC: 144 MG/DL (ref 70–99)
GLUCOSE BLDC GLUCOMTR-MCNC: 189 MG/DL (ref 70–99)
GLUCOSE BLDC GLUCOMTR-MCNC: 196 MG/DL (ref 70–99)
GLUCOSE BLDC GLUCOMTR-MCNC: 277 MG/DL (ref 70–99)
GLUCOSE BLDC GLUCOMTR-MCNC: 313 MG/DL (ref 70–99)
HOLD SPECIMEN: NORMAL
PROT SERPL-MCNC: 7.9 G/DL (ref 6.8–8.8)

## 2022-12-22 PROCEDURE — 250N000013 HC RX MED GY IP 250 OP 250 PS 637: Performed by: INTERNAL MEDICINE

## 2022-12-22 PROCEDURE — 250N000012 HC RX MED GY IP 250 OP 636 PS 637: Performed by: INTERNAL MEDICINE

## 2022-12-22 PROCEDURE — 80076 HEPATIC FUNCTION PANEL: CPT | Performed by: INTERNAL MEDICINE

## 2022-12-22 PROCEDURE — 120N000002 HC R&B MED SURG/OB UMMC

## 2022-12-22 PROCEDURE — 99222 1ST HOSP IP/OBS MODERATE 55: CPT | Mod: GC | Performed by: INTERNAL MEDICINE

## 2022-12-22 PROCEDURE — 99232 SBSQ HOSP IP/OBS MODERATE 35: CPT | Performed by: INTERNAL MEDICINE

## 2022-12-22 PROCEDURE — 97116 GAIT TRAINING THERAPY: CPT | Mod: GP

## 2022-12-22 PROCEDURE — 36415 COLL VENOUS BLD VENIPUNCTURE: CPT | Performed by: INTERNAL MEDICINE

## 2022-12-22 RX ADMIN — GABAPENTIN 300 MG: 300 CAPSULE ORAL at 08:52

## 2022-12-22 RX ADMIN — DOCUSATE SODIUM 100 MG: 100 CAPSULE, LIQUID FILLED ORAL at 19:58

## 2022-12-22 RX ADMIN — INSULIN ASPART 1.5 UNITS: 100 INJECTION, SOLUTION INTRAVENOUS; SUBCUTANEOUS at 17:18

## 2022-12-22 RX ADMIN — GABAPENTIN 300 MG: 300 CAPSULE ORAL at 14:27

## 2022-12-22 RX ADMIN — METFORMIN HYDROCHLORIDE 2000 MG: 500 TABLET, EXTENDED RELEASE ORAL at 16:54

## 2022-12-22 RX ADMIN — PANTOPRAZOLE SODIUM 40 MG: 40 TABLET, DELAYED RELEASE ORAL at 16:54

## 2022-12-22 RX ADMIN — FOLIC ACID 1 MG: 1 TABLET ORAL at 08:54

## 2022-12-22 RX ADMIN — INSULIN ASPART 1 UNITS: 100 INJECTION, SOLUTION INTRAVENOUS; SUBCUTANEOUS at 13:22

## 2022-12-22 RX ADMIN — DOCUSATE SODIUM 100 MG: 100 CAPSULE, LIQUID FILLED ORAL at 08:53

## 2022-12-22 RX ADMIN — GABAPENTIN 300 MG: 300 CAPSULE ORAL at 19:59

## 2022-12-22 RX ADMIN — Medication 1 MG: at 20:05

## 2022-12-22 RX ADMIN — Medication 1 TABLET: at 08:54

## 2022-12-22 RX ADMIN — PANCRELIPASE 3 CAPSULE: 24000; 76000; 120000 CAPSULE, DELAYED RELEASE PELLETS ORAL at 17:23

## 2022-12-22 RX ADMIN — PANCRELIPASE 3 CAPSULE: 24000; 76000; 120000 CAPSULE, DELAYED RELEASE PELLETS ORAL at 08:51

## 2022-12-22 RX ADMIN — LEVETIRACETAM 2000 MG: 500 TABLET, FILM COATED ORAL at 08:52

## 2022-12-22 RX ADMIN — PANCRELIPASE 3 CAPSULE: 24000; 76000; 120000 CAPSULE, DELAYED RELEASE PELLETS ORAL at 14:27

## 2022-12-22 RX ADMIN — TAMSULOSIN HYDROCHLORIDE 0.4 MG: 0.4 CAPSULE ORAL at 08:51

## 2022-12-22 RX ADMIN — ACETAMINOPHEN 650 MG: 325 TABLET, FILM COATED ORAL at 11:04

## 2022-12-22 RX ADMIN — PANTOPRAZOLE SODIUM 40 MG: 40 TABLET, DELAYED RELEASE ORAL at 08:54

## 2022-12-22 RX ADMIN — THIAMINE HCL TAB 100 MG 100 MG: 100 TAB at 08:55

## 2022-12-22 RX ADMIN — CLONIDINE HYDROCHLORIDE 0.1 MG: 0.1 TABLET ORAL at 08:52

## 2022-12-22 RX ADMIN — LEVETIRACETAM 2000 MG: 500 TABLET, FILM COATED ORAL at 19:59

## 2022-12-22 RX ADMIN — INSULIN ASPART 0.5 UNITS: 100 INJECTION, SOLUTION INTRAVENOUS; SUBCUTANEOUS at 08:56

## 2022-12-22 ASSESSMENT — ACTIVITIES OF DAILY LIVING (ADL)
ADLS_ACUITY_SCORE: 23

## 2022-12-22 NOTE — PLAN OF CARE
Goal Outcome Evaluation:  Patient is alert and oriented x 4. VSS. Neuros WNL. C/O pain to his neck, abdomen and bilateral lower extremities and received Tylenol which was effective in the pain management. Calm and cooperative with cares. IV line on  right arm is patent, flushed and saline locked. Dressing to right elbow was changed, no drainage or odor noted. Independent with bed mobility. Ate 100% of both breakfast and lunch with adequate fluid intake. Ambulated on the hallway with Physical therapy and tolerated it well. Staff will continue to monitor.

## 2022-12-22 NOTE — TELEPHONE ENCOUNTER
1st attempt to schedule- unable to reach patient  Phone number disconnected, msg sent back to Alvina for updated contact number       ----- Message from Alvina Leslie PA-C sent at 2022  3:34 PM CST -----  Regarding: [facility] EGD+Colonoscopy  Hi,    Could you please assist in scheduling:    Procedure: EGD + Colonoscopy  Physician: Dr. Justin Pavon saw inpatient, otherwise next available  Timinst available  Location/anesthesia: MAC  Dx: Esophageal thickening on imaging, Iron Deficiency    Comments:  -- Patient is currently inpatient.     Please let me know if you have any questions/concerns.     Thank you for your help!  Danni Leslie PA-C  Inpatient Gastroenterology Service  [facility]  Pager: #[phone]  Text Page

## 2022-12-22 NOTE — CONSULTS
GASTROENTEROLOGY CONSULTATION      Date of Admission:  12/6/2022           Reason for Consultation:   We were asked by Dr. Brewster to evaluate this patient with nausea and esophageal thickening           ASSESSMENT AND RECOMMENDATIONS:   Assessment:  62 year old male with alcohol use disorder, chronic pancreatitis 2/2 recurrent alcohol related acute pancreatitis c/b pancreatic duct stone s/p ERCP (2020), prior lung cancer with brain metastasis s/p radiation and surgical resection now with lung nodule under surveillance, prior C diff, blindness, seen in consultation for nausea and esophageal thickening.     #. Nausea  #. Early satiety  #. Bloating  #. Esophageal thickening  Patient describes symptoms of nausea, bloating, and early satiety present over the past several months, more prominent when he eats prior to his pancreatic enzymes.  Maldigestion could explain all the symptoms, so would begin with ensuring he receives his pancreatic enzyme supplementation just prior to or with his meals.  Delayed gastric emptying, potentially postinfectious after COVID infection earlier this year, could also explain the symptoms, and would also potentially explain esophageal thickening (i.e. sequela of reflux) seen on CT scan earlier this month. Treatment of his irregular bowel habits with fiber may also help, although I did warn him that in the short-term fiber can cause some mild bloating which generally resolves.    #. Change in stool caliber  #. Bloody stool  #. Iron deficiency anemia  Patient reports irregular bowel habits, alternating between constipation and loose stools depending on whether he is taking a bowel regimen or not.  However, stools seem to have been thinner recently, and OT mentioned to him that there was blood in his stool (he is blind so could not verify). This, coupled with his microcytic anemia with documented iron deficiency in November, warrants colonoscopy to r/o malignancy or other cause. EGD can be  performed with this to evaluate his esophageal thickening and UGI symptoms as above.  No urgent indication for this as an inpatient, and can be scheduled as an outpatient.    #. 9mm hepatic lesion - may be a cyst or hemangioma.    Recommendations  - ensure he receives Creon dose just before or with meals to optimize digestion  - initiate fiber supplement to help regulate bowels  - can trial gastroparesis diet should symptoms persist despite improving Creon timing  - outpatient EGD/colon to evaluate blood in stool, GRETCHEN, and UGI symptoms/esophageal thickening (this has been ordered)  - Recommend follow up CT liver protocol or MRI liver in 3 months to follow 9mm hepatic lesion      GI will sign off. Please call with questions    Gastroenterology outpatient follow up recommendations: Outpatient EGD/colon as above    Thank you for involving us in this patient's care. Please do not hesitate to contact the GI service with any questions or concerns.     Pt care plan discussed with Dr. Pavon, GI staff physician.    Tyrese Betts MD      ATTENDING ATTESTATION:     DATE SEEN: 12/22/2022    Patient was discussed, seen, and examined by me, Justin Pavon. The plan of care and pertinent data/imaging were reviewed with the GI Consult team and GI Fellow. Agree with the assessment and plan as delineated above with the following additions:     Will plan for outpatient EGD/colonoscopy to evaluate esophageal thickening and iron deficiency and BRBPR.     Agree with consistent use of Creon and trial of gastroparesis diet.    Start fiber. Can take miralax as needed for constipation.    Recommend follow up CT liver protocol or MRI liver in 3 months to follow 9mm hepatic lesion    GI will sign off. Please call with questions.        Please contact me with any further questions.    Justin Pavon MD  [facility]  Division of Gastroenterology, Hepatology and  Nutrition        -------------------------------------------------------------------------------------------------------------------           History of Present Illness:   Dallas Deluca is a 62 year old male with alcohol use disorder, chronic pancreatitis 2/2 recurrent alcohol related acute pancreatitis c/b pancreatic duct stone s/p ERCP (2020), prior lung cancer with brain metastasis s/p radiation and surgical resection now with lung nodule under surveillance, prior C diff, blindness, seen in consultation for nausea and esophageal thickening.      Patient describes nausea, bloating, and early satiety specifically with meals, which he has noticed most significantly during his hospitalization over the last 2 weeks.  He thinks this also may have begun with his COVID infection a few months ago, but the symptoms are more pronounced when he is here in the hospital.  He notes that he has been receiving his Creon sometimes after his meals, and this seems to significantly exacerbate his symptoms.  Seems to occur with any type of food, he does not correlate with certain types such as spicy, acidic, fatty foods.  He had been losing weight as an outpatient, but is able to gain a few pounds back while here in the hospital.    Also endorses rapidly changing bowel habits, sometimes loose, other times constipated, depending on whether or not he is taking a stool softener or not.  He feels like he cannot stay in the middle.  He has not trialed fiber before.  He also notes that his stools seem smaller and thinner when they come out.  Given his blindness, he is not able to see them, but was told by OT that he had bright red blood in his stool a few days ago.  He endorses a colonoscopy/EGD approximately 10 years ago, which I do not see in our system.    He has a history of pancreatic duct stone s/p ERCP and stenting at [facility] in 2020. He also tells me that something similar happened more recently at [place] with repeat stents,  "although I do not see evidence of this in the chart.    He has a distant history of lung cancer with brain metastasis in [place]. More recently, CT scans have shown a LLL nodule which is under surveillance but has not been biopsied.  CT head here without evidence of midline shift or other findings concerning for metastasis.    Labs are relatively unremarkable here other than microcytic anemia (hgb 9, MCV 72). Iron studies at [place] in November with e/o iron deficiency.     Imaging as below w/ e/o chronic pancreatitis, esophageal thickening.          Past Medical History:   Reviewed and edited as appropriate  Past Medical History:   Diagnosis Date     Alcohol abuse      Alcohol-induced acute pancreatitis      COPD (chronic obstructive pulmonary disease) (H)      Diabetes mellitus (H)      Homelessness      Lung cancer (H)     with brain mets     Seizure disorder (H)             Past Surgical History:   Reviewed and edited as appropriate   Past Surgical History:   Procedure Laterality Date     BRAIN SURGERY       EYE SURGERY              Previous Endoscopy:   No results found for this or any previous visit.         Social History:   Reviewed and edited as appropriate  Social History     Socioeconomic History     Marital status:      Spouse name: Not on file     Number of children: Not on file     Years of education: Not on file     Highest education level: Not on file   Occupational History     Not on file   Tobacco Use     Smoking status: Every Day     Packs/day: 0.25     Types: Cigarettes     Smokeless tobacco: Never     Tobacco comments:     3 cigarettes/day for last few years (as of Dec 2022) (former smoking 1 PPD since age 10.)   Substance and Sexual Activity     Alcohol use: Yes     Alcohol/week: 10.0 standard drinks     Types: 10 Cans of beer per week     Drug use: Not Currently     Comment: Tried \"stuff\" in the past as a teen     Sexual activity: Not on file   Other Topics Concern     Not on file "   Social History Narrative     Not on file     Social Determinants of Health     Financial Resource Strain: Not on file   Food Insecurity: Not on file   Transportation Needs: Not on file   Physical Activity: Not on file   Stress: Not on file   Social Connections: Not on file   Intimate Partner Violence: Not on file   Housing Stability: Not on file            Family History:   Reviewed and edited as appropriate  Family History   Problem Relation Age of Onset     Brain Cancer Father 50     Lung Cancer Father      No known history of colorectal cancer, liver disease, or inflammatory bowel disease.         Allergies:   Reviewed and edited as appropriate     Allergies   Allergen Reactions     Lisinopril      Morphine             Medications:     Current Facility-Administered Medications   Medication     acetaminophen (TYLENOL) tablet 650 mg    Or     acetaminophen (TYLENOL) Suppository 650 mg     albuterol (PROVENTIL HFA/VENTOLIN HFA) inhaler     amylase-lipase-protease (CREON 24) 85607-93869 units per EC capsule 3 capsule     bisacodyl (DULCOLAX) EC tablet 5 mg     bisacodyl (DULCOLAX) suppository 10 mg     glucose gel 15-30 g    Or     dextrose 50 % injection 25-50 mL    Or     glucagon injection 1 mg     docusate sodium (COLACE) capsule 100 mg     flumazenil (ROMAZICON) injection 0.2 mg     folic acid (FOLVITE) tablet 1 mg     gabapentin (NEURONTIN) capsule 300 mg     OLANZapine zydis (zyPREXA) ODT tab 5-10 mg    Or     haloperidol lactate (HALDOL) injection 2.5-5 mg     Injection Device for insulin (NOVOPEN ECHO) 1 each     insulin aspart (NovoPen ECHO/NovoLOG) cartridge     insulin aspart (NovoPen ECHO/NovoLOG) cartridge     levETIRAcetam (KEPPRA) tablet 2,000 mg     lidocaine (LMX4) cream     lidocaine 1 % 0.1-1 mL     magnesium hydroxide (MILK OF MAGNESIA) suspension 30 mL     melatonin tablet 1 mg     metFORMIN (GLUCOPHAGE XR) 24 hr tablet 2,000 mg     multivitamin w/minerals (THERA-VIT-M) tablet 1 tablet      "ondansetron (ZOFRAN ODT) ODT tab 4-8 mg     pantoprazole (PROTONIX) EC tablet 40 mg     sodium chloride (PF) 0.9% PF flush 3 mL     sodium chloride (PF) 0.9% PF flush 3 mL     tamsulosin (FLOMAX) capsule 0.4 mg     thiamine (B-1) tablet 100 mg             Review of Systems:     A complete 10 point review of systems was performed and is negative except as noted in the HPI           Physical Exam:   /74 (BP Location: Left arm)   Pulse 71   Temp 98.1  F (36.7  C) (Oral)   Resp 14   Ht 1.778 m (5' 10\")   Wt 76.6 kg (168 lb 14.4 oz)   SpO2 97%   BMI 24.23 kg/m    Wt:   Wt Readings from Last 2 Encounters:   12/14/22 76.6 kg (168 lb 14.4 oz)   03/19/20 72.6 kg (160 lb)      Constitutional: No acute distress, resting comfortably in bed  Ears/nose/mouth/throat: Moist mucus membranes, hearing intact\  CV: No edema  Respiratory: Breathing comfortably on room air  Abd: Soft, Mildly tender to palpation throughout the upper abdomen without rebound or guarding, nondistended, bowel sounds present  Skin: warm, perfused, no jaundice  Neuro: AAO x 3  Psych: Normal affect  MSK: No gross deformities         Data:   Labs and imaging below were independently reviewed and interpreted    BMP  Recent Labs   Lab 12/22/22  0806 12/22/22  0235 12/21/22  2140 12/21/22  1740 12/21/22  1151 12/21/22  0746 12/20/22  0654 12/20/22  0542 12/18/22  0715 12/18/22  0701 12/16/22  0808 12/16/22  0621   NA  --   --   --   --   --   --   --  136  --   --   --  132*   POTASSIUM  --   --   --   --   --   --   --  4.1  --   --   --  3.7   CHLORIDE  --   --   --   --   --   --   --  103  --   --   --  103   RASHMI  --   --   --   --   --   --   --  9.6  --   --   --  8.6   CO2  --   --   --   --   --   --   --  23  --   --   --  25   BUN  --   --   --   --   --   --   --  13  --   --   --  12   CR  --   --   --   --   --  0.54*  --  0.55*  --  0.51*  --  0.51*   * 196* 215* 179*   < >  --    < > 142*   < >  --    < > 271*    < > = values in " this interval not displayed.     CBC  Recent Labs   Lab 12/21/22  0746 12/20/22  0542 12/18/22  0701 12/16/22  0621   WBC  --  9.1  --  5.9   RBC  --  4.07*  --  3.56*   HGB  --  9.0*  --  8.0*   HCT  --  29.2*  --  25.6*   MCV  --  72*  --  72*   MCH  --  22.1*  --  22.5*   MCHC  --  30.8*  --  31.3*   RDW  --  21.5*  --  21.2*    381 298 245     INRNo lab results found in last 7 days.  LFTs  Recent Labs   Lab 12/22/22  0628 12/20/22  0542 12/16/22  0621   ALKPHOS 79 95 78   AST 63* 73* 46*   ALT 39 44 33   BILITOTAL 0.3 0.3 0.3   PROTTOTAL 7.9 8.4 7.6   ALBUMIN 2.7* 2.9* 2.6*      PANCNo lab results found in last 7 days.    Imaging:    IMPRESSION:   1. Multiple pancreatic calcifications with slight prominence of the  pancreatic duct, consistent with history of chronic pancreatitis.  2. Cholelithiasis without findings to suggest acute cholecystitis.  3. Mild hepatomegaly. A 9 mm hepatic hypodensity adjacent to the  gallbladder fossa appears fairly well circumscribed suggesting small  cyst or hemangioma. Can consider further evaluation with a liver  protocol MRI with contrast versus attention on follow-up CT. If the  patient has any prior outside imaging of this region, correlation is  seen suggested.  4. Partially visualized circumferential wall thickening of the distal  esophagus, which may suggest esophagitis.  5. Additional stable chronic findings as described in the report.
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
## 2022-12-22 NOTE — PLAN OF CARE
Alert and O x4, left eye blind.     VSS. On RA.     C/o pain al over body this Am, managed well with Tylenol.     Denied chest pain, SOB, nausea,    Good appetite on R/T diet, Bg checked before meals with sliding scale.     Cont of B/B, LBM today, using toilet.   SBA with walker, steady gait.     Wound dressing at R fingers, R elbow, L knee were changed today.     Plan to discharge to TCU, pending.

## 2022-12-23 LAB
GLUCOSE BLDC GLUCOMTR-MCNC: 163 MG/DL (ref 70–99)
GLUCOSE BLDC GLUCOMTR-MCNC: 246 MG/DL (ref 70–99)
GLUCOSE BLDC GLUCOMTR-MCNC: 250 MG/DL (ref 70–99)
GLUCOSE BLDC GLUCOMTR-MCNC: 271 MG/DL (ref 70–99)
GLUCOSE BLDC GLUCOMTR-MCNC: 290 MG/DL (ref 70–99)

## 2022-12-23 PROCEDURE — 250N000013 HC RX MED GY IP 250 OP 250 PS 637: Performed by: INTERNAL MEDICINE

## 2022-12-23 PROCEDURE — 250N000012 HC RX MED GY IP 250 OP 636 PS 637: Performed by: INTERNAL MEDICINE

## 2022-12-23 PROCEDURE — 99232 SBSQ HOSP IP/OBS MODERATE 35: CPT | Performed by: INTERNAL MEDICINE

## 2022-12-23 PROCEDURE — 120N000002 HC R&B MED SURG/OB UMMC

## 2022-12-23 RX ORDER — GLYBURIDE 2.5 MG/1
2.5 TABLET ORAL
Status: DISCONTINUED | OUTPATIENT
Start: 2022-12-24 | End: 2022-12-25 | Stop reason: HOSPADM

## 2022-12-23 RX ADMIN — PANTOPRAZOLE SODIUM 40 MG: 40 TABLET, DELAYED RELEASE ORAL at 09:09

## 2022-12-23 RX ADMIN — ACETAMINOPHEN 650 MG: 325 TABLET, FILM COATED ORAL at 13:40

## 2022-12-23 RX ADMIN — GABAPENTIN 300 MG: 300 CAPSULE ORAL at 13:39

## 2022-12-23 RX ADMIN — THIAMINE HCL TAB 100 MG 100 MG: 100 TAB at 09:05

## 2022-12-23 RX ADMIN — Medication 1 TABLET: at 09:05

## 2022-12-23 RX ADMIN — INSULIN ASPART 1 UNITS: 100 INJECTION, SOLUTION INTRAVENOUS; SUBCUTANEOUS at 11:58

## 2022-12-23 RX ADMIN — PANCRELIPASE 3 CAPSULE: 24000; 76000; 120000 CAPSULE, DELAYED RELEASE PELLETS ORAL at 11:58

## 2022-12-23 RX ADMIN — LEVETIRACETAM 2000 MG: 500 TABLET, FILM COATED ORAL at 20:41

## 2022-12-23 RX ADMIN — PANCRELIPASE 3 CAPSULE: 24000; 76000; 120000 CAPSULE, DELAYED RELEASE PELLETS ORAL at 17:00

## 2022-12-23 RX ADMIN — LEVETIRACETAM 2000 MG: 500 TABLET, FILM COATED ORAL at 09:04

## 2022-12-23 RX ADMIN — GABAPENTIN 300 MG: 300 CAPSULE ORAL at 20:41

## 2022-12-23 RX ADMIN — INSULIN ASPART 1.5 UNITS: 100 INJECTION, SOLUTION INTRAVENOUS; SUBCUTANEOUS at 16:58

## 2022-12-23 RX ADMIN — PANTOPRAZOLE SODIUM 40 MG: 40 TABLET, DELAYED RELEASE ORAL at 16:56

## 2022-12-23 RX ADMIN — TAMSULOSIN HYDROCHLORIDE 0.4 MG: 0.4 CAPSULE ORAL at 09:06

## 2022-12-23 RX ADMIN — INSULIN ASPART 0.5 UNITS: 100 INJECTION, SOLUTION INTRAVENOUS; SUBCUTANEOUS at 22:22

## 2022-12-23 RX ADMIN — INSULIN ASPART 0.5 UNITS: 100 INJECTION, SOLUTION INTRAVENOUS; SUBCUTANEOUS at 09:07

## 2022-12-23 RX ADMIN — FOLIC ACID 1 MG: 1 TABLET ORAL at 09:06

## 2022-12-23 RX ADMIN — GABAPENTIN 300 MG: 300 CAPSULE ORAL at 09:05

## 2022-12-23 RX ADMIN — METFORMIN HYDROCHLORIDE 2000 MG: 500 TABLET, EXTENDED RELEASE ORAL at 16:55

## 2022-12-23 RX ADMIN — ACETAMINOPHEN 650 MG: 325 TABLET, FILM COATED ORAL at 09:05

## 2022-12-23 ASSESSMENT — ACTIVITIES OF DAILY LIVING (ADL)
ADLS_ACUITY_SCORE: 23

## 2022-12-23 NOTE — PROGRESS NOTES
[facility]    Medicine Progress Note - Hospitalist Service, GOLD TEAM 18    Date of Admission:  12/6/2022    Assessment & Plan    Patient is a 63 y/o man who has multiple medical problems including alcohol dependence, alcoholic pancreatitis, chronic pancreatitis, past pancreatic stents, seizure disorder, COPD, hypokalemia and microcytic anemia. Patient presented with concerns about seizure activity. Patient was found to have alcohol withdrawal and alcoholic ketoacidosis, both of which appear resolved. Patient is pending placement.       #Seizure disorder  -- Patient on seizure precautions for now.  -- Patient back on Keppra     #Hypoxemia of unclear cause, patient now on room air  -- Monitoring for additional symptoms  -- Monitoring respiratory status.      #Peripheral neuropathy  -- Patient back on gabapentin.     #Alcohol dependence with alcohol withdrawal; withdrawal appears resolve  -- Monitoring for changes  -- titrated off clonidine ( should be done  12/22 )      #Alcoholic ketoacidosis, appears resolved  -- No active intervention indicated at present     #Chronic pancreatitis  -- Patient on Creon, dose increased 12/20    --history pancreatic duct stent  Later removed      Ongoing nausea, no emesis  - shemar has been having nausea for past month or so, can get nauseated without eating , this  is new for kain,   - potential delayed gastric emptying   - out patient  GI eval  Per GI        #Generalized abdominal discomfort of unclear cause  - ongoing nausea   -- Acetaminophen as needed. Monitoring for additional symptoms.  - LFT  AST slightly increased , monitor bilirubin ALT normal   - CT abdomen 12/22 with multiple pancreatic calcifications . Cholelithiasis without acute cholecystitis , hepatomegaly . Possible cyst vs hemangioma    - need to make sure pancreatin enzyme given before meal       Possible esophagitis   - per CT12/22  has circumferential wall thickening  -  will need EGD as out patient  At time of colonoscopy     - continue  PPI      0.9 mmm hepatic lesion  - CT liver protocol or MRI in 3 months     #Left lower lobe nodule  -- CT chest in 3 months as outpatient.     #COPD  -- Patient compensated  -- Monitoring for changes.     #Blindness  -- Patient has history of left eye enucleation and patient has poor vision in right eye  -- Monitoring for safety.     #T2DM; patient listed as being on metformin as outpatient  -- Serum glucose suboptimally controlled  --- blood sugar 130s to 200s   -- back on metformin 2000 mg with supper, consider reducing if has ongoing diarrhea   -- Patient on insulin sliding scale.  HgA1c  6.7 on 12/6       #Microcytic anemia; iron deficiency anemia  -- Anticipate that patient will eventually be on supplemental iron as outpatient   -- out patient  colonoscopy.     #Peripheral edema  -- Monitoring for additional symptoms.    #Hypokalemia, hypomagnesemia, hypophosphatemia  -- Supplement as needed.     #Dysuria  -- Patient had negative urinalysis and did not appear to have urinary retention  -- Monitoring for additional symptoms.     #Constipation  -- Bowel regimen.  - GI rec to increase fiber       #Left ear stage 2 pressure injury, hospital acquired  -- Wound care.    #Generalized weakness  -- PT/OT    #Covid-19 exposure; PCR was negative  -- Monitoring for symptoms    States he had COVID ~ 4 months ( actually has had + test 6/2022)  ago and has not been the same since , follow up  With long COVID clinic           Diet: Combination Diet Regular Diet Adult  Snacks/Supplements Adult: Ensure Enlive; With Meals  Room Service    DVT Prophylaxis: ambulate   Trejo Catheter: Not present  Central Lines: None  Cardiac Monitoring: None  Code Status: Full Code      Disposition Plan   Awaiting placement      The patient's care was discussed with the care team  .    Charito Brewster MD  Hospitalist Service, GOLD TEAM 18  [facility]  [facility]  Securely message with the Vocera Web Console (learn more here)  Text page via [facility] Paging/Directory   Please see signed in provider for up to date coverage information      Clinically Significant Risk Factors              # Hypoalbuminemia: Lowest albumin = 2.5 g/dL at 12/9/2022  7:10 AM, will monitor as appropriate          # DMII: A1C = 6.7 % (Ref range: <5.7 %) within past 3 months    # Moderate Malnutrition: based on nutrition assessment        ______________________________________________________________________    Interval History    Feels better getting stronger, still ongoing nausea  No chest pain  No shortness of breath      Data reviewed today: I reviewed all medications, new labs and imaging results over the last 24 hours.   Physical Exam   Vital Signs: Temp: 97.9  F (36.6  C) Temp src: Oral BP: 123/66 Pulse: 72   Resp: 15 SpO2: 99 % O2 Device: None (Room air)    Weight: 168 lbs 14.4 oz   General appearence: awake alert  In no apparent distress    HEENT: EOMI, PEARLA, sclera nonicteric,  moist,  mucus membranes,   NECK : supple  RESPIRATORY: lungs clear to auscultation bilateral,    CARDIOVASCULAR:S1 S2 regular rate and rhythm, no rubs gallops or murmurs appreciated  GASTROINTESTINAL:soft, non-distended has generalized tenderness worse in upper abdomen, no rebound or guarding, fullness in ruq  + bowel sounds   SKIN: warm and dry, no mottling noted   NEUROLOGIC; awake alert and oriented  EXTREMITIES: no clubbing, cyanosis or edema , moves all extremity, good pedal pulses   MUSCULOSKELETAL: without deformity       Data   Recent Labs   Lab 12/22/22  1653 12/22/22  1321 12/22/22  0806 12/22/22  0628 12/21/22  1151 12/21/22  0746 12/20/22  0654 12/20/22  0542 12/18/22  0715 12/18/22  0701 12/16/22  0808 12/16/22  0621   WBC  --   --   --   --   --   --   --  9.1  --   --   --  5.9   HGB  --   --   --   --   --   --   --  9.0*  --   --   --  8.0*   MCV  --   --   --   --   --   --    --  72*  --   --   --  72*   PLT  --   --   --   --   --  388  --  381  --  298  --  245   NA  --   --   --   --   --   --   --  136  --   --   --  132*   POTASSIUM  --   --   --   --   --   --   --  4.1  --   --   --  3.7   CHLORIDE  --   --   --   --   --   --   --  103  --   --   --  103   CO2  --   --   --   --   --   --   --  23  --   --   --  25   BUN  --   --   --   --   --   --   --  13  --   --   --  12   CR  --   --   --   --   --  0.54*  --  0.55*  --  0.51*  --  0.51*   ANIONGAP  --   --   --   --   --   --   --  10  --   --   --  4   RASHMI  --   --   --   --   --   --   --  9.6  --   --   --  8.6   * 277* 144*  --    < >  --    < > 142*   < >  --    < > 271*   ALBUMIN  --   --   --  2.7*  --   --   --  2.9*  --   --   --  2.6*   PROTTOTAL  --   --   --  7.9  --   --   --  8.4  --   --   --  7.6   BILITOTAL  --   --   --  0.3  --   --   --  0.3  --   --   --  0.3   ALKPHOS  --   --   --  79  --   --   --  95  --   --   --  78   ALT  --   --   --  39  --   --   --  44  --   --   --  33   AST  --   --   --  63*  --   --   --  73*  --   --   --  46*    < > = values in this interval not displayed.     No results found for this or any previous visit (from the past 24 hour(s)).

## 2022-12-23 NOTE — PLAN OF CARE
Patient VSS and A&O x 4  Patient BS at 1700 was 313; received 1.5 units, and tolerated well. Bedtime  is within range  Patient IV removed due infiltration at 1840 and not replaced. Patient tolerated medication well. Patient received melatonin and is resting well.

## 2022-12-23 NOTE — PROGRESS NOTES
[facility]    Medicine Progress Note - Hospitalist Service, GOLD TEAM 18    Date of Admission:  12/6/2022    Assessment & Plan    Patient is a 61 y/o man who has multiple medical problems including alcohol dependence, alcoholic pancreatitis, chronic pancreatitis, past pancreatic stents, seizure disorder, COPD, hypokalemia and microcytic anemia. Patient presented with concerns about seizure activity. Patient was found to have alcohol withdrawal and alcoholic ketoacidosis, both of which appear resolved. Patient is pending placement.       #Seizure disorder  -- Patient on seizure precautions for now.  -- Patient back on Keppra     #Hypoxemia of unclear cause, patient now on room air  -- Monitoring for additional symptoms  -- Monitoring respiratory status.      #Peripheral neuropathy  -- Patient back on gabapentin.     #Alcohol dependence with alcohol withdrawal; withdrawal appears resolve  -- Monitoring for changes  -- titrated off clonidine ( should be done  12/22 )      #Alcoholic ketoacidosis, appears resolved  -- No active intervention indicated at present     #Chronic pancreatitis  -- Patient on Creon, dose increased 12/20    --history pancreatic duct stent  Later removed      Ongoing nausea, no emesis  - shemar has been having nausea for past month or so, can get nauseated without eating , this  is new for kain,   - potential delayed gastric emptying   - out patient  GI eval  Per GI        #Generalized abdominal discomfort of unclear cause  - ongoing nausea   -- Acetaminophen as needed. Monitoring for additional symptoms.  - LFT  AST slightly increased , monitor bilirubin ALT normal   - CT abdomen 12/22 with multiple pancreatic calcifications . Cholelithiasis without acute cholecystitis , hepatomegaly . Possible cyst vs hemangioma    - need to make sure pancreatin enzyme given before meal       Possible esophagitis   - per CT12/22  has circumferential wall thickening  -  will need EGD as out patient  At time of colonoscopy     - continue  PPI      0.9 mmm hepatic lesion  - CT liver protocol or MRI in 3 months     #Left lower lobe nodule  -- CT chest in 3 months as outpatient.     #COPD  -- Patient compensated  -- Monitoring for changes.     #Blindness  -- Patient has history of left eye enucleation and patient has poor vision in right eye  -- Monitoring for safety.     #T2DM; patient listed as being on metformin as outpatient  -- Serum glucose suboptimally controlled  --- blood sugar 130s to 200s   -- back on metformin 2000 mg with supper, consider reducing if has ongoing diarrhea   -- Patient on insulin sliding scale.  HgA1c  6.7 on 12/6    - blood sugar remain high, added glybiride 12/23      #Microcytic anemia; iron deficiency anemia  -- Anticipate that patient will eventually be on supplemental iron as outpatient   -- out patient  colonoscopy.     #Peripheral edema  -- Monitoring for additional symptoms.    #Hypokalemia, hypomagnesemia, hypophosphatemia  -- Supplement as needed.     #Dysuria  -- Patient had negative urinalysis and did not appear to have urinary retention  -- Monitoring for additional symptoms.     #Constipation  -- Bowel regimen.  - GI rec to increase fiber       #Left ear stage 2 pressure injury, hospital acquired  -- Wound care.    #Generalized weakness  -- PT/OT    #Covid-19 exposure; PCR was negative  -- Monitoring for symptoms    States he had COVID ~ 4 months ( actually has had + test 6/2022)  ago and has not been the same since , follow up  With long COVID clinic           Diet: Combination Diet Regular Diet Adult  Snacks/Supplements Adult: Ensure Enlive; With Meals  Room Service    DVT Prophylaxis: ambulate   Trejo Catheter: Not present  Central Lines: None  Cardiac Monitoring: None  Code Status: Full Code      Disposition Plan   Awaiting placement      The patient's care was discussed with the care team  .    Charito Brewster MD  Hospitalist Service,  GOLD TEAM 18  [facility]  Securely message with the Vocera Web Console (learn more here)  Text page via [facility] Paging/Directory   Please see signed in provider for up to date coverage information      Clinically Significant Risk Factors              # Hypoalbuminemia: Lowest albumin = 2.5 g/dL at 12/9/2022  7:10 AM, will monitor as appropriate          # DMII: A1C = 6.7 % (Ref range: <5.7 %) within past 3 months    # Moderate Malnutrition: based on nutrition assessment        ______________________________________________________________________    Interval History    Doing bit better, now taking creon with food     Data reviewed today: I reviewed all medications, new labs and imaging results over the last 24 hours.   Physical Exam   Vital Signs: Temp: 97.9  F (36.6  C) Temp src: Oral BP: 117/71 Pulse: 83   Resp: 15 SpO2: 95 % O2 Device: None (Room air)    Weight: 168 lbs 14.4 oz   General appearence: awake alert  In no apparent distress    HEENT: EOMI, PEARLA, sclera nonicteric,  moist,  mucus membranes,   NECK : supple  RESPIRATORY: lungs clear to auscultation bilateral,    CARDIOVASCULAR:S1 S2 regular rate and rhythm, no rubs gallops or murmurs appreciated  GASTROINTESTINAL:soft, non-distended has generalized tenderness worse in upper abdomen, no rebound or guarding, fullness in ruq  + bowel sounds   SKIN: warm and dry, no mottling noted   NEUROLOGIC; awake alert and oriented  EXTREMITIES: no clubbing, cyanosis or edema , moves all extremity, good pedal pulses   MUSCULOSKELETAL: without deformity       Data   Recent Labs   Lab 12/23/22  1143 12/23/22  0812 12/23/22  0153 12/22/22  0806 12/22/22  0628 12/21/22  1151 12/21/22  0746 12/20/22  0654 12/20/22  0542 12/18/22  0715 12/18/22  0701   WBC  --   --   --   --   --   --   --   --  9.1  --   --    HGB  --   --   --   --   --   --   --   --  9.0*  --   --    MCV  --   --   --   --   --   --   --   --  72*  --   --     PLT  --   --   --   --   --   --  388  --  381  --  298   NA  --   --   --   --   --   --   --   --  136  --   --    POTASSIUM  --   --   --   --   --   --   --   --  4.1  --   --    CHLORIDE  --   --   --   --   --   --   --   --  103  --   --    CO2  --   --   --   --   --   --   --   --  23  --   --    BUN  --   --   --   --   --   --   --   --  13  --   --    CR  --   --   --   --   --   --  0.54*  --  0.55*  --  0.51*   ANIONGAP  --   --   --   --   --   --   --   --  10  --   --    RASHMI  --   --   --   --   --   --   --   --  9.6  --   --    * 163* 246*   < >  --    < >  --    < > 142*   < >  --    ALBUMIN  --   --   --   --  2.7*  --   --   --  2.9*  --   --    PROTTOTAL  --   --   --   --  7.9  --   --   --  8.4  --   --    BILITOTAL  --   --   --   --  0.3  --   --   --  0.3  --   --    ALKPHOS  --   --   --   --  79  --   --   --  95  --   --    ALT  --   --   --   --  39  --   --   --  44  --   --    AST  --   --   --   --  63*  --   --   --  73*  --   --     < > = values in this interval not displayed.     No results found for this or any previous visit (from the past 24 hour(s)).
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
## 2022-12-23 NOTE — PLAN OF CARE
Goal Outcome Evaluation:       No IV access, denies pain. AxOx4. On RA. No overt changes overnight. Continue to monitor

## 2022-12-24 ENCOUNTER — APPOINTMENT (OUTPATIENT)
Dept: PHYSICAL THERAPY | Facility: CLINIC | Age: 62
End: 2022-12-24
Payer: COMMERCIAL

## 2022-12-24 LAB
CREAT SERPL-MCNC: 0.52 MG/DL (ref 0.66–1.25)
GFR SERPL CREATININE-BSD FRML MDRD: >90 ML/MIN/1.73M2
GLUCOSE BLDC GLUCOMTR-MCNC: 129 MG/DL (ref 70–99)
GLUCOSE BLDC GLUCOMTR-MCNC: 161 MG/DL (ref 70–99)
GLUCOSE BLDC GLUCOMTR-MCNC: 166 MG/DL (ref 70–99)
GLUCOSE BLDC GLUCOMTR-MCNC: 193 MG/DL (ref 70–99)
GLUCOSE BLDC GLUCOMTR-MCNC: 196 MG/DL (ref 70–99)
GLUCOSE BLDC GLUCOMTR-MCNC: 238 MG/DL (ref 70–99)
PLATELET # BLD AUTO: 375 10E3/UL (ref 150–450)

## 2022-12-24 PROCEDURE — 250N000013 HC RX MED GY IP 250 OP 250 PS 637: Performed by: INTERNAL MEDICINE

## 2022-12-24 PROCEDURE — 99232 SBSQ HOSP IP/OBS MODERATE 35: CPT | Performed by: INTERNAL MEDICINE

## 2022-12-24 PROCEDURE — 36415 COLL VENOUS BLD VENIPUNCTURE: CPT | Performed by: INTERNAL MEDICINE

## 2022-12-24 PROCEDURE — 97116 GAIT TRAINING THERAPY: CPT | Mod: GP | Performed by: REHABILITATION PRACTITIONER

## 2022-12-24 PROCEDURE — 97110 THERAPEUTIC EXERCISES: CPT | Mod: GP | Performed by: REHABILITATION PRACTITIONER

## 2022-12-24 PROCEDURE — 120N000002 HC R&B MED SURG/OB UMMC

## 2022-12-24 PROCEDURE — 85049 AUTOMATED PLATELET COUNT: CPT | Performed by: INTERNAL MEDICINE

## 2022-12-24 PROCEDURE — 82565 ASSAY OF CREATININE: CPT | Performed by: INTERNAL MEDICINE

## 2022-12-24 RX ADMIN — PANTOPRAZOLE SODIUM 40 MG: 40 TABLET, DELAYED RELEASE ORAL at 06:38

## 2022-12-24 RX ADMIN — PANCRELIPASE 3 CAPSULE: 24000; 76000; 120000 CAPSULE, DELAYED RELEASE PELLETS ORAL at 17:46

## 2022-12-24 RX ADMIN — GABAPENTIN 300 MG: 300 CAPSULE ORAL at 14:24

## 2022-12-24 RX ADMIN — GABAPENTIN 300 MG: 300 CAPSULE ORAL at 21:20

## 2022-12-24 RX ADMIN — LEVETIRACETAM 2000 MG: 500 TABLET, FILM COATED ORAL at 09:45

## 2022-12-24 RX ADMIN — INSULIN ASPART 0.5 UNITS: 100 INJECTION, SOLUTION INTRAVENOUS; SUBCUTANEOUS at 22:15

## 2022-12-24 RX ADMIN — PANCRELIPASE 3 CAPSULE: 24000; 76000; 120000 CAPSULE, DELAYED RELEASE PELLETS ORAL at 07:41

## 2022-12-24 RX ADMIN — GABAPENTIN 300 MG: 300 CAPSULE ORAL at 09:44

## 2022-12-24 RX ADMIN — PANTOPRAZOLE SODIUM 40 MG: 40 TABLET, DELAYED RELEASE ORAL at 17:46

## 2022-12-24 RX ADMIN — Medication 1 TABLET: at 09:45

## 2022-12-24 RX ADMIN — PANCRELIPASE 3 CAPSULE: 24000; 76000; 120000 CAPSULE, DELAYED RELEASE PELLETS ORAL at 13:42

## 2022-12-24 RX ADMIN — TAMSULOSIN HYDROCHLORIDE 0.4 MG: 0.4 CAPSULE ORAL at 09:46

## 2022-12-24 RX ADMIN — INSULIN ASPART 0.5 UNITS: 100 INJECTION, SOLUTION INTRAVENOUS; SUBCUTANEOUS at 17:48

## 2022-12-24 RX ADMIN — ACETAMINOPHEN 650 MG: 325 TABLET, FILM COATED ORAL at 21:20

## 2022-12-24 RX ADMIN — LEVETIRACETAM 2000 MG: 500 TABLET, FILM COATED ORAL at 21:20

## 2022-12-24 RX ADMIN — ACETAMINOPHEN 650 MG: 325 TABLET, FILM COATED ORAL at 00:44

## 2022-12-24 RX ADMIN — THIAMINE HCL TAB 100 MG 100 MG: 100 TAB at 09:46

## 2022-12-24 RX ADMIN — METFORMIN HYDROCHLORIDE 2000 MG: 500 TABLET, EXTENDED RELEASE ORAL at 17:46

## 2022-12-24 RX ADMIN — GLYBURIDE 2.5 MG: 2.5 TABLET ORAL at 09:45

## 2022-12-24 RX ADMIN — INSULIN ASPART 0.5 UNITS: 100 INJECTION, SOLUTION INTRAVENOUS; SUBCUTANEOUS at 13:42

## 2022-12-24 RX ADMIN — Medication 1 MG: at 21:20

## 2022-12-24 RX ADMIN — FOLIC ACID 1 MG: 1 TABLET ORAL at 09:45

## 2022-12-24 ASSESSMENT — ACTIVITIES OF DAILY LIVING (ADL)
ADLS_ACUITY_SCORE: 23

## 2022-12-24 NOTE — PLAN OF CARE
Writer called the number given by care coordinator (number located on treatment sticky note) to get a hold of pt's partner. The place called could not confirm nor deny someone with that name was staying at the place. Writer gave the person on the other side of the line a phone number and to pass along to the partner that the pt is in the hospital. Pt was informed.     Pt was calm and cooperative, and had no complaints this shift. Continue with POC.

## 2022-12-24 NOTE — PROGRESS NOTES
[facility]    Medicine Progress Note - Hospitalist Service, GOLD TEAM 18    Date of Admission:  12/6/2022    Assessment & Plan    Patient is a 61 y/o man who has multiple medical problems including alcohol dependence, alcoholic pancreatitis, chronic pancreatitis, past pancreatic stents, seizure disorder, COPD, hypokalemia and microcytic anemia. Patient presented with concerns about seizure activity. Patient was found to have alcohol withdrawal and alcoholic ketoacidosis, both of which appear resolved. Patient is pending placement.       #Seizure disorder  -- Patient on seizure precautions for now.  -- Patient back on Keppra     #Hypoxemia of unclear cause, patient now on room air  -- Monitoring for additional symptoms  -- Monitoring respiratory status.      #Peripheral neuropathy  -- Patient back on gabapentin.     #Alcohol dependence with alcohol withdrawal; withdrawal appears resolve  -- Monitoring for changes  -- titrated off clonidine ( should be done  12/22 )      #Alcoholic ketoacidosis, appears resolved  -- No active intervention indicated at present     #Chronic pancreatitis  -- Patient on Creon, dose increased 12/20    --history pancreatic duct stent  Later removed      Ongoing nausea, no emesis  - shemar has been having nausea for past month or so, can get nauseated without eating , this  is new for kain,   - potential delayed gastric emptying   - out patient  GI eval  Per GI        #Generalized abdominal discomfort of unclear cause  - ongoing nausea   -- Acetaminophen as needed. Monitoring for additional symptoms.  - LFT  AST slightly increased , monitor bilirubin ALT normal   - CT abdomen 12/22 with multiple pancreatic calcifications . Cholelithiasis without acute cholecystitis , hepatomegaly . Possible cyst vs hemangioma    - need to make sure pancreatin enzyme given before meal       Possible esophagitis   - per CT12/22  has circumferential wall thickening  -  will need EGD as out patient  At time of colonoscopy     - continue  PPI      0.9 mmm hepatic lesion  - CT liver protocol or MRI in 3 months     #Left lower lobe nodule  -- CT chest in 3 months as outpatient.     #COPD  -- Patient compensated  -- Monitoring for changes.     #Blindness  -- Patient has history of left eye enucleation and patient has poor vision in right eye  -- Monitoring for safety.     #T2DM; patient listed as being on metformin as outpatient  -- Serum glucose suboptimally controlled  --- blood sugar 130s to 200s   -- back on metformin 2000 mg with supper, consider reducing if has ongoing diarrhea   -- Patient on insulin sliding scale.  HgA1c  6.7 on 12/6    - blood sugar remain high, added glybiride 12/23      #Microcytic anemia; iron deficiency anemia  -- Anticipate that patient will eventually be on supplemental iron as outpatient   -- out patient  colonoscopy.     #Peripheral edema  -- Monitoring for additional symptoms.    #Hypokalemia, hypomagnesemia, hypophosphatemia  -- Supplement as needed.     #Dysuria  -- Patient had negative urinalysis and did not appear to have urinary retention  -- Monitoring for additional symptoms.     #Constipation  -- Bowel regimen.  - GI rec to increase fiber       #Left ear stage 2 pressure injury, hospital acquired  -- Wound care.    #Generalized weakness  -- PT/OT    #Covid-19 exposure; PCR was negative  -- Monitoring for symptoms    States he had COVID ~ 4 months ( actually has had + test 6/2022)  ago and has not been the same since , follow up  With long COVID clinic           Diet: Combination Diet Regular Diet Adult  Snacks/Supplements Adult: Ensure Enlive; With Meals  Room Service    DVT Prophylaxis: ambulate   Trejo Catheter: Not present  Central Lines: None  Cardiac Monitoring: None  Code Status: Full Code      Disposition Plan   Awaiting placement      The patient's care was discussed with the care team  .    Charito Brewster MD  Hospitalist Service,  GOLD TEAM 18  [facility]  Securely message with the [employer] Web Console (learn more here)  Text page via [facility] Paging/Directory   Please see signed in provider for up to date coverage information      Clinically Significant Risk Factors              # Hypoalbuminemia: Lowest albumin = 2.5 g/dL at 12/9/2022  7:10 AM, will monitor as appropriate          # DMII: A1C = 6.7 % (Ref range: <5.7 %) within past 3 months    # Moderate Malnutrition: based on nutrition assessment        ______________________________________________________________________    Interval History    Still having some diarrhea, no nausea vomiting , abdominal pain gets worse with eating  No chest pain  No shortness of breath      Data reviewed today: I reviewed all medications, new labs and imaging results over the last 24 hours.   Physical Exam   Vital Signs: Temp: 97.9  F (36.6  C) Temp src: Oral BP: 108/60 Pulse: 85   Resp: 18 SpO2: 97 % O2 Device: None (Room air)    Weight: 168 lbs 14.4 oz   General appearence: awake alert  In no apparent distress    HEENT: EOMI, PEARLA, sclera nonicteric,  moist,  mucus membranes,   NECK : supple  RESPIRATORY: lungs clear to auscultation bilateral,    CARDIOVASCULAR:S1 S2 regular rate and rhythm, no rubs gallops or murmurs appreciated  GASTROINTESTINAL:soft, non-distended has generalized tenderness worse in upper abdomen, no rebound or guarding, fullness in ruq  + bowel sounds   SKIN: warm and dry, no mottling noted   NEUROLOGIC; awake alert and oriented  EXTREMITIES: no clubbing, cyanosis or edema , moves all extremity, good pedal pulses   MUSCULOSKELETAL: without deformity       Data   Recent Labs   Lab 12/24/22  0545 12/24/22  0154 12/23/22  2114 12/23/22  1619 12/22/22  0806 12/22/22  0628 12/21/22  1151 12/21/22  0746 12/20/22  0654 12/20/22  0542 12/18/22  0715 12/18/22  0701   WBC  --   --   --   --   --   --   --   --   --  9.1  --   --    HGB  --   --   --    --   --   --   --   --   --  9.0*  --   --    MCV  --   --   --   --   --   --   --   --   --  72*  --   --      --   --   --   --   --   --  388  --  381  --  298   NA  --   --   --   --   --   --   --   --   --  136  --   --    POTASSIUM  --   --   --   --   --   --   --   --   --  4.1  --   --    CHLORIDE  --   --   --   --   --   --   --   --   --  103  --   --    CO2  --   --   --   --   --   --   --   --   --  23  --   --    BUN  --   --   --   --   --   --   --   --   --  13  --   --    CR  --   --   --   --   --   --   --  0.54*  --  0.55*  --  0.51*   ANIONGAP  --   --   --   --   --   --   --   --   --  10  --   --    RASHMI  --   --   --   --   --   --   --   --   --  9.6  --   --    GLC  --  196* 250* 290*   < >  --    < >  --    < > 142*   < >  --    ALBUMIN  --   --   --   --   --  2.7*  --   --   --  2.9*  --   --    PROTTOTAL  --   --   --   --   --  7.9  --   --   --  8.4  --   --    BILITOTAL  --   --   --   --   --  0.3  --   --   --  0.3  --   --    ALKPHOS  --   --   --   --   --  79  --   --   --  95  --   --    ALT  --   --   --   --   --  39  --   --   --  44  --   --    AST  --   --   --   --   --  63*  --   --   --  73*  --   --     < > = values in this interval not displayed.     No results found for this or any previous visit (from the past 24 hour(s)).

## 2022-12-24 NOTE — PLAN OF CARE
Goal Outcome Evaluation:       Pt. A&Ox 4. VSS. Afebrile. Lung sounds clear on RA. LBM 12/23.  Denies nausea, shortness of breath, and chest pain. Tylenol given for headache. Pt rested overnight with no acute changes. No IV access. Call light within reach and able to make needs known.

## 2022-12-24 NOTE — PLAN OF CARE
"/71 (BP Location: Left arm)   Pulse 81   Temp 97.9  F (36.6  C) (Oral)   Resp 18   Ht 1.778 m (5' 10\")   Wt 76.6 kg (168 lb 14.4 oz)   SpO2 98%   BMI 24.23 kg/m      Patient A&Ox4. Able to make needs known.   Ind in room   On RA.   BS & Insulin.   Denies SOB, CP. N/V.       Awaiting placement. Continue with POC.   "

## 2022-12-25 VITALS
SYSTOLIC BLOOD PRESSURE: 120 MMHG | RESPIRATION RATE: 18 BRPM | DIASTOLIC BLOOD PRESSURE: 76 MMHG | BODY MASS INDEX: 24.18 KG/M2 | TEMPERATURE: 97.9 F | WEIGHT: 168.9 LBS | OXYGEN SATURATION: 97 % | HEART RATE: 91 BPM | HEIGHT: 70 IN

## 2022-12-25 LAB
GLUCOSE BLDC GLUCOMTR-MCNC: 167 MG/DL (ref 70–99)
GLUCOSE BLDC GLUCOMTR-MCNC: 224 MG/DL (ref 70–99)
GLUCOSE BLDC GLUCOMTR-MCNC: 289 MG/DL (ref 70–99)

## 2022-12-25 PROCEDURE — 250N000013 HC RX MED GY IP 250 OP 250 PS 637: Performed by: INTERNAL MEDICINE

## 2022-12-25 PROCEDURE — 99239 HOSP IP/OBS DSCHRG MGMT >30: CPT | Performed by: INTERNAL MEDICINE

## 2022-12-25 RX ORDER — GLYBURIDE 2.5 MG/1
2.5 TABLET ORAL
Qty: 30 TABLET | Refills: 0 | Status: SHIPPED | OUTPATIENT
Start: 2022-12-26

## 2022-12-25 RX ORDER — FERROUS SULFATE 325(65) MG
325 TABLET ORAL
Start: 2022-12-25

## 2022-12-25 RX ADMIN — PANCRELIPASE 3 CAPSULE: 24000; 76000; 120000 CAPSULE, DELAYED RELEASE PELLETS ORAL at 06:45

## 2022-12-25 RX ADMIN — THIAMINE HCL TAB 100 MG 100 MG: 100 TAB at 08:39

## 2022-12-25 RX ADMIN — PANTOPRAZOLE SODIUM 40 MG: 40 TABLET, DELAYED RELEASE ORAL at 06:42

## 2022-12-25 RX ADMIN — GABAPENTIN 300 MG: 300 CAPSULE ORAL at 08:40

## 2022-12-25 RX ADMIN — LEVETIRACETAM 2000 MG: 500 TABLET, FILM COATED ORAL at 08:40

## 2022-12-25 RX ADMIN — Medication 1 TABLET: at 08:39

## 2022-12-25 RX ADMIN — INSULIN ASPART 0.5 UNITS: 100 INJECTION, SOLUTION INTRAVENOUS; SUBCUTANEOUS at 07:31

## 2022-12-25 RX ADMIN — GLYBURIDE 2.5 MG: 2.5 TABLET ORAL at 08:39

## 2022-12-25 RX ADMIN — INSULIN ASPART 1 UNITS: 100 INJECTION, SOLUTION INTRAVENOUS; SUBCUTANEOUS at 11:42

## 2022-12-25 RX ADMIN — FOLIC ACID 1 MG: 1 TABLET ORAL at 08:39

## 2022-12-25 RX ADMIN — TAMSULOSIN HYDROCHLORIDE 0.4 MG: 0.4 CAPSULE ORAL at 08:38

## 2022-12-25 ASSESSMENT — ACTIVITIES OF DAILY LIVING (ADL)
ADLS_ACUITY_SCORE: 23

## 2022-12-25 NOTE — PROGRESS NOTES
Patient  Wife is here , patient  Wants to discharge  Tripathi snot want to stay and tripathi snot want TCU.   He has been up ambulating on his own, went off the floor with his wife , needs walker  They state they will be heading to [place] tomorrow, he does not want to stay so will not hold him    Charito Brewster MD

## 2022-12-25 NOTE — PROGRESS NOTES
Pt was able to receive a phone call from partner. Partner was able to state that she plans to come visit tomorrow. Pt has been resting comfortably and able to ambulate to the bathroom independently with his walker. Pt received meal time meds on time and had no concerns. Continue POC.

## 2022-12-25 NOTE — DISCHARGE INSTRUCTIONS
You will need to follow up  with your primary care MD   You need to have  a follow up CT on your liver to look at the small liver lesions ( or MRI) also you need follow up  CT  scab of chest in 3 months  to follow up on lung nodule to make sure its not cancer.   Also you need out patient  EGD ( upper endoscopy to look eat your stomach and esophagus ) as well as colonoscopy     call Mental Health and Addiction Services Line: [phone] and make an appt through [facility] if  you would like an evaluation and referrals to CD treatment

## 2022-12-25 NOTE — PROGRESS NOTES
Care Management Follow Up    Length of Stay (days): 19    Expected Discharge Date: 12/25/2022     Concerns to be Addressed: all concerns addressed in this encounter     Patient plan of care discussed at interdisciplinary rounds: Yes    Anticipated Discharge Disposition: Home  Disposition Comments: Home with wife to [place], [place] via [place] tomorrow 12.26.2022  Anticipated Discharge Services: Other (see comment) (patient is going to set up OP PT in [place], [place])  Anticipated Discharge DME: [facility] (PT to issue prior to discharge)    Patient/family educated on Medicare website which has current facility and service quality ratings: no  Education Provided on the Discharge Plan:    Patient/Family in Agreement with the Plan: yes    Referrals Placed by CM/SW: External Care Coordination      Additional Information:  RNCC placed order for outpatient PT to take with him to set up care in [place]. Patient aware.       KAREN Trujillo, BSN., RN  Nurse Care Coordinator  Pager: [phone] - RNCC [facility]  Social Work and Care Management Department   [address] [place] [id number]  [email or web address]  Employed by [facility]     SEARCHABLE in [employer] - search CARE COORDINATOR     [place] (9516-4237) Saturday & Sunday; ([phone]) [facility] Recognized Holidays     Units: 4A, 4C, 4E, 5A & 5B   Pager: [phone]    Units: 6A & 6B    Pager: [phone]    Units: 6C & 6D   Pager: [phone]    Units: 7A, 7B, 7C, 7D & 5C    Pager: [phone]    Units: [facility] ED, 5 Ortho, 5 Med/Surg, 6 Med/Surg, 8A, 10 ICU, & [facility]    Pager: [phone]

## 2022-12-25 NOTE — PLAN OF CARE
"/76 (BP Location: Left arm)   Pulse 91   Temp 97.9  F (36.6  C) (Oral)   Resp 18   Ht 1.778 m (5' 10\")   Wt 76.6 kg (168 lb 14.4 oz)   SpO2 97%   BMI 24.23 kg/m      Patient A&Ox4. Able to make needs known.   VSS on RA.   Ind in room with walker.   Denies SOB, chest pain, N/V, N/T.   BS and Insulin.   Wife at bedside.    Patient discharged to a hotel with wife around 1153am with medications and all personal belongings. Discharge paperwork discussed with patient and spouse. Patient and spouse acknowledged understanding and all questions answered.   "

## 2022-12-25 NOTE — DISCHARGE SUMMARY
[facility]  Hospitalist Discharge Summary      Date of Admission:  12/6/2022  Date of Discharge: 12/25/2022   Discharging Provider: Charito Brewster MD  Discharge Service: Hospitalist Service, GOLD TEAM 18    Discharge Diagnoses   Seizure disorder  hypoxemia of unclear cause  peripheral neuropathy  Alcohol dependence with alcohol withdrawal  Alcoholic ketoacidosis, appears resolved  Chronic pancreatitis   nausea  Generalized abdominal discomfort of unclear cause  Possible esophagitis   0.9 mmm hepatic lesion  Left lower lobe nodule  COPD  Blindness  T2DM   Microcytic anemia; iron deficiency anemia  Peripheral edema  Hypokalemia, hypomagnesemia, hypophosphatemia  Dysuria  Constipation  Left ear stage 2 pressure injury, hospital acquired  Generalized weakness    Follow-ups Needed After Discharge   Follow-up Appointments     Adult [facility] Follow-up and recommended labs and tests      Follow up with primary care provider, Mae Davis, within 7 days to   evaluate treatment change and for hospital follow- up.      you need to have  a follow up CT on your liver to look at the small liver   lesions ( or MRI) also you need follow up  CT  scab of chest in 3 months    to follow up on lung nodule to make sure its not cancer.   Also you need out patient  EGD ( upper endoscopy to look eat your stomach   and esophagus ) as well as colonoscopy     Appointments on [place] and/or [facility] (with [facility] or [facility]   provider or service). Call [phone] if you haven't heard regarding   these appointments within 7 days of discharge.             Unresulted Labs Ordered in the Past 30 Days of this Admission     No orders found from 11/6/2022 to 12/7/2022.          Discharge Disposition   Discharged with his  wife   Condition at discharge: Stable      Hospital Course      Patient is a 61 y/o man who has multiple medical problems including alcohol dependence, alcoholic pancreatitis,  chronic pancreatitis, past pancreatic stents, seizure disorder, COPD, hypokalemia and microcytic anemia. Patient presented with concerns about seizure activity. Patient was found to have alcohol withdrawal and alcoholic ketoacidosis, both of which appear resolved. Patient was  pending placement but refused TCU. After some time wife was located who came in. Patient  Now refuses TCU and wants to leave with his wife, He tafoya se en ambulating by himself with walker         #Seizure disorder  -- Patient on seizure precautions for now.  -- Patient back on Keppra  - has been doing well      #Hypoxemia of unclear cause, patient now on room air  -- has been stable an doff O2      #Peripheral neuropathy  -- Patient back on gabapentin.     #Alcohol dependence with alcohol withdrawal; withdrawal appears resolve  -- seen by chemical depedency , information written for discharge         #Alcoholic ketoacidosis,  -- resolved      #Chronic pancreatitis  -- Patient on Creon, dose increased 12/20    --history pancreatic duct stent  Later removed       Ongoing nausea, no emesis  - shemar has been having nausea for past month or so, can get nauseated without eating , this  is new for him,   - potential delayed gastric emptying   - out patient  GI eval  Per GI     - doing better with increased dose of creon and timing it with food       #Generalized abdominal discomfort of unclear cause  - LFT  AST slightly increased ,  bilirubin ALT normal   - CT abdomen 12/22 with multiple pancreatic calcifications . Cholelithiasis without acute cholecystitis , hepatomegaly . Possible cyst vs hemangioma    - need to make sure pancreatin enzyme given before meal , discussed with  Patient          Possible esophagitis   - per CT12/22  has circumferential wall thickening  - will need EGD as out patient  At time of colonoscopy     - continue  PPI       0.9 mmm hepatic lesion  - CT liver protocol or MRI in 3 months , discussed with  Patient       #Left lower  lobe nodule  -- CT chest in 3 months as outpatient. Discussed with  Patient       #COPD  -- Patient compensated       #Blindness  -- Patient has history of left eye enucleation and patient has poor vision in right eye       #T2DM; patient listed as being on metformin as outpatient  -- Serum glucose suboptimally controlled  --- blood sugar 130s to 200s   -- back on metformin 2000 mg with supper, - blood sugar remain high, added glybiride 12/23  And blood sugar now better   --HgA1c  6.7 on 12/6         #Microcytic anemia; iron deficiency anemia  -- Anticipate that patient will eventually be on supplemental iron as outpatient   -- out patient  colonoscopy.     #Peripheral edema  -- Monitoring for additional symptoms.  - resolved      #Hypokalemia, hypomagnesemia, hypophosphatemia  -- Supplement as needed.     #Dysuria  -- Patient had negative urinalysis and did not appear to have urinary retention       #Constipation  -- not issues   - GI rec to increase fiber       #Left ear stage 2 pressure injury, hospital acquired  -- stable, resolved .     #Generalized weakness  -- he is now ambulating alone wit use of walker      #Covid-19 exposure; PCR was negative  -- no symptoms      States he had COVID ~ 4 months ( actually has had + test 6/2022)  ago and has not been the same since , follow up  With long COVID clinic           Consultations This Hospital Stay   PHYSICAL THERAPY ADULT IP CONSULT  OCCUPATIONAL THERAPY ADULT IP CONSULT  CHEMICAL DEPENDENCY IP CONSULT  MEDICATION HISTORY IP PHARMACY CONSULT  PHYSICAL THERAPY ADULT IP CONSULT  OCCUPATIONAL THERAPY ADULT IP CONSULT  WOUND OSTOMY CONTINENCE NURSE  IP CONSULT  WOUND OSTOMY CONTINENCE NURSE  IP CONSULT  CARE MANAGEMENT / SOCIAL WORK IP CONSULT  NUTRITION SERVICES ADULT IP CONSULT  GI LUMINAL ADULT IP CONSULT    Code Status   Full Code    Time Spent on this Encounter   ICharito MD, personally saw the patient today and spent greater than 30 minutes  discharging this patient.       Charito Brewster MD  [facility] MED SURG  [address] [address]  Phone: [phone]  Fax: [phone]  ______________________________________________________________________    Physical Exam   Vital Signs:     BP: 120/76 Pulse: 91   Resp: 18 SpO2: 97 % O2 Device: None (Room air)    Weight: 168 lbs 14.4 oz     General appearence: awake alert in  no apparent distress    HEENT:  Left artificial eye ,  moist,  mucus membranes,   NECK : supple  RESPIRATORY: lungs clear to auscultation bilateral,  no wheezing or crackles   CARDIOVASCULAR:S1 S2 regular rate and rhythm, no rubs gallops or murmurs appreciated  GASTROINTESTINAL:soft, non-distended , epigastric ruq tenderness ( chronic ) no rebound or guarding  , + bowel sounds,   SKIN: warm and dry, no mottling noted   NEUROLOGIC; awake alert and oriented, no focal deficits found  EXTREMITIES: no clubbing, cyanosis or edema , moves all extremity,  Gait steady    MUSCULOSKELETAL: without deformity          Primary Care Physician   Mae Davis    Discharge Orders      Adult GI  Referral - Procedure Only      Reason for your hospital stay    Alcohol withdrawal seizure     Activity    Your activity upon discharge: activity as tolerated     Adult [facility] Follow-up and recommended labs and tests    Follow up with primary care provider, Mae Davis, within 7 days to evaluate treatment change and for hospital follow- up.        Appointments on [place] and/or [facility] (with [facility] or [facility] provider or service). Call [phone] if you haven't heard regarding these appointments within 7 days of discharge.     Diet    Follow this diet upon discharge: diabetic       Significant Results and Procedures   Most Recent 3 CBC's:Recent Labs   Lab Test 12/24/22  0545 12/21/22  0746 12/20/22  0542 12/18/22  0701 12/16/22  0621 12/12/22  0724 12/11/22  0710   WBC  --   --  9.1  --  5.9  --  7.4   HGB   --   --  9.0*  --  8.0*  --  8.2*   MCV  --   --  72*  --  72*  --  72*    388 381   < > 245   < > 114*    < > = values in this interval not displayed.     Most Recent 3 BMP's:Recent Labs   Lab Test 12/25/22  0642 12/25/22  0259 12/24/22  2214 12/24/22  0745 12/24/22  0545 12/21/22  1151 12/21/22  0746 12/20/22  0654 12/20/22  0542 12/16/22  0808 12/16/22  0621 12/15/22  1233 12/15/22  0856 12/11/22  0855 12/11/22  0710   NA  --   --   --   --   --   --   --   --  136  --  132*  --   --   --  133   POTASSIUM  --   --   --   --   --   --   --   --  4.1  --  3.7  --  4.0   < > 4.0   CHLORIDE  --   --   --   --   --   --   --   --  103  --  103  --   --   --  104   CO2  --   --   --   --   --   --   --   --  23  --  25  --   --   --  23   BUN  --   --   --   --   --   --   --   --  13  --  12  --   --   --  9   CR  --   --   --   --  0.52*  --  0.54*  --  0.55*   < > 0.51*  --  0.51*   < > 0.51*   ANIONGAP  --   --   --   --   --   --   --   --  10  --  4  --   --   --  6   RASHMI  --   --   --   --   --   --   --   --  9.6  --  8.6  --   --   --  8.9   * 224* 238*   < >  --    < >  --    < > 142*   < > 271*   < >  --    < > 175*    < > = values in this interval not displayed.     Most Recent 2 LFT's:Recent Labs   Lab Test 12/22/22 0628 12/20/22  0542   AST 63* 73*   ALT 39 44   ALKPHOS 79 95   BILITOTAL 0.3 0.3     Most Recent 3 INR's:No lab results found.,   Results for orders placed or performed during the hospital encounter of 12/06/22   XR Chest Port 1 View    Narrative    EXAM: XR CHEST PORT 1 VIEW  LOCATION: [facility]  DATE/TIME: 12/6/2022 6:53 AM    INDICATION: chest pain  COMPARISON: Chest radiograph 05/12/2022      Impression    IMPRESSION: Stable cardiomediastinal silhouette. Mild left basilar atelectasis. No focal pulmonary consolidation otherwise. No pleural effusion or pneumothorax. No acute osseous abnormality.   CT Head w/o Contrast    Narrative     EXAM: CT HEAD W/O CONTRAST  LOCATION: [facility]  DATE/TIME: 12/6/2022 6:46 AM    INDICATION: Fall, possible head injury and intoxication.  COMPARISON: 11/19/2022  TECHNIQUE: Routine CT Head without IV contrast. Multiplanar reformats. Dose reduction techniques were used.    FINDINGS:  INTRACRANIAL CONTENTS: Mildly limited by motion. No definite intracranial hemorrhage, extraaxial collection, or mass effect.  No CT evidence of acute infarct. Mild volume loss and presumed chronic small vessel ischemia are stable.     VISUALIZED ORBITS/SINUSES/MASTOIDS: No intraorbital abnormality. Left globe prosthesis. No paranasal sinus mucosal disease. No middle ear or mastoid effusion.    BONES/SOFT TISSUES: No acute abnormality.      Impression    IMPRESSION:  1.  No acute intracranial abnormality or significant change compared to the prior study.               CT Chest w/o Contrast    Narrative    EXAMINATION: CT CHEST W/O CONTRAST, 12/6/2022 4:08 PM    TECHNIQUE:  Helical CT images from the thoracic inlet through the lung  bases were obtained without IV contrast.     COMPARISON: Chest x-ray 12/6/2022, 3/19/2020.    HISTORY: r/o Lung cancer; No known/automatically detected potential  contraindications to iodinated contrast    FINDINGS:    Chest:     Thyroid gland is unremarkable. Heart size is normal. No pericardial  effusion. Advanced multivessel coronary artery calcifications.  Thoracic aorta and main pulmonary arteries are normal in caliber with  conventional three-vessel branching pattern of the aortic arch.  Esophagus is unremarkable.    Prominent but not enlarged mediastinal lymph nodes. No axillary  adenopathy.    Trachea and central airways are clear. Bibasilar right greater than  left subsegmental atelectasis. No pleural effusion or pneumothorax.  Mild right upper lobe predominant paraseptal emphysematous changes.  Irregular nodule in the left lower lobe measuring 10 x 10  mm (series  6, image 251). Calcified granuloma in the left lower lobe.    Abdomen: Examination of the upper abdomen is limited. Hepatic  steatosis. Recannulized paraumbilical vein.    Bones: No suspicious osseous lesion. Old left-sided rib fractures.      Soft Tissues: No suspicious mass.      Impression    IMPRESSION:   1.  10 mm nodule in the left lower lobe. Consider follow-up CT chest  or PET/CT at 3 months per Fleischner Society guidelines.  2.  Hepatic steatosis.    I have personally reviewed the examination and initial interpretation  and I agree with the findings.    REYNA CHANDLER MD         SYSTEM ID:  [id number]   XR Abdomen 1 View    Narrative    EXAM: XR ABDOMEN 1 VIEW  12/10/2022 1:43 PM     HISTORY:  Generalized abdominal discomfort       COMPARISON:  CT 12/6/2022    FINDINGS:   Portable supine frontal views of the abdomen.    The stomach is distended and filled with heterogenous ingested  contents. Moderate colonic stool burden. No obstructive bowel gas  pattern. No portal venous gas or appreciable pneumatosis.    The included osseous structures and soft tissues are within normal  limits.     The lung bases are clear.      Impression    IMPRESSION:     1. Moderate to large distention of the stomach with ingested contents,  may be seen with gastroparesis versus gastric outlet  obstruction/proximal bowel obstruction. Consider CT abdomen if  clinically desired.  2. Mild to moderate colonic stool burden.    I have personally reviewed the examination and initial interpretation  and I agree with the findings.    SANDIP NICHOLAS MD         SYSTEM ID:  [id number]   CT Abdomen Pelvis w Contrast    Narrative    EXAMINATION: CT ABDOMEN PELVIS W CONTRAST, 12/11/2022 3:47 PM    INDICATION: Increasing abdominal pain and nausea    COMPARISON STUDY: Chest CT 12/6/2022    TECHNIQUE: CT scan of the abdomen and pelvis was performed on  multidetector CT scanner using volumetric acquisition technique and  images were  reconstructed in multiple planes with variable thickness  and reviewed on dedicated workstations.     CONTRAST: 81mL isovue 370 injected IV without oral contrast    CT scan radiation dose is optimized to minimum requisite dose using  automated dose modulation techniques.    FINDINGS:    Lower thorax: Decreased size of the left lower lobe 8 mm solid  pulmonary nodule, which appears to be more bandlike and may represent  scarring/atelectasis (series 2, image 18). Mild bilateral dependent  atelectasis. Coronary artery calcification. Circumferential wall  thickening of the visualized lower esophagus.    Liver: No intrahepatic biliary ductal dilation.  Diffuse hepatic  steatosis. 9 mm hypodense area adjacent to the gallbladder fossa  (series 2, image 76). This is not well-visualized on prior noncontrast  CT 12/6/2022. Liver appears slightly enlarged at 18 cm craniocaudad.    Biliary System: Multiple radiodense gallstones dependently towards the  neck and cystic duct. No wall thickening. No extrahepatic biliary  ductal dilation.    Pancreas: Similar-appearing multiple pancreatic calcifications. No  mass. Mild pancreatic ductal dilation measuring 6 to 7 mm in the  body/neck region.    Adrenal glands: No mass or nodules    Spleen: Normal.    Kidneys: No suspicious mass, obstructing calculus or hydronephrosis.    Gastrointestinal tract: Distended stomach with gastric content. Normal  appendix. Normal caliber small and large bowel.    Mesentery/peritoneum/retroperitoneum: No mass. No free fluid or air.    Lymph nodes: No significant lymphadenopathy. Scattered upper abdominal  and retroperitoneal lymph nodes are slightly numerous but not enlarged  by size criteria, possibly reactive.    Vasculature: Patent major abdominal vasculature.  Scattered  atherosclerotic calcification of abdominal aorta with no aneurysmal  dilation.  Portosystemic collaterals.    Pelvis: Urinary bladder wall appears slightly thickened although  this  may be in part due to degree of distention.  Pelvic phleboliths.  No  pelvic mass.    Osseous structures: No aggressive or acute osseous lesion.  Multilevel  degenerative changes of the visualized spine.      Soft tissues: Small fat-containing right inguinal hernia.      Impression    IMPRESSION:   1. Multiple pancreatic calcifications with slight prominence of the  pancreatic duct, consistent with history of chronic pancreatitis.  2. Cholelithiasis without findings to suggest acute cholecystitis.  3. Mild hepatomegaly. A 9 mm hepatic hypodensity adjacent to the  gallbladder fossa appears fairly well circumscribed suggesting small  cyst or hemangioma. Can consider further evaluation with a liver  protocol MRI with contrast versus attention on follow-up CT. If the  patient has any prior outside imaging of this region, correlation is  seen suggested.  4. Partially visualized circumferential wall thickening of the distal  esophagus, which may suggest esophagitis.  5. Additional stable chronic findings as described in the report.    I have personally reviewed the examination and initial interpretation  and I agree with the findings.    MARTIN ADRIAN MD         SYSTEM ID:  [id number]   Echo Complete     Value    LVEF  55-60%    Narrative    [id number]  [id number]  [id number]  [id number]^DARYA^KAY^EMERALD     [facility]  Echocardiography Laboratory  [address] [place] [id number]     Name: OSCAR SILVA  MRN: [id number]  : 1960  Study Date: 2022 03:46 PM  Age: 62 yrs  Gender: Male  Patient Location: [facility]  Reason For Study: Edema  Ordering Physician: KAY LACKEY  Performed By: Kareen Stevens     BSA: 1.9 m2  Height: 70 in  Weight: 160 lb  HR: 96  BP: 113/64 mmHg  ______________________________________________________________________________  Procedure  Complete Portable Echo Adult. Echocardiogram with two-dimensional, color and  spectral Doppler  performed.  ______________________________________________________________________________  Interpretation Summary  Left ventricular size, wall motion and function are normal. The ejection  fraction is 55-60%.  The right ventricle is normal size.  Global right ventricular function is normal.  IVC diameter and respiratory changes fall into an intermediate range  suggesting an RA pressure of 8 mmHg.  No pericardial effusion is present.  ______________________________________________________________________________  Left Ventricle  Left ventricular size, wall motion and function are normal. The ejection  fraction is 55-60%. Left ventricular diastolic function is normal.     Right Ventricle  The right ventricle is normal size. Global right ventricular function is  normal.     Atria  Both atria appear normal.     Mitral Valve  The mitral valve is normal. Trace mitral insufficiency is present.     Aortic Valve  Aortic valve is normal in structure and function. The aortic valve is  tricuspid.     Tricuspid Valve  The tricuspid valve is normal. Trace tricuspid insufficiency is present.     Pulmonic Valve  The pulmonic valve is normal.     Vessels  The aorta root is normal. The thoracic aorta is normal. The pulmonary artery  cannot be assessed. IVC diameter and respiratory changes fall into an  intermediate range suggesting an RA pressure of 8 mmHg.     Pericardium  No pericardial effusion is present.     Compared to Previous Study  Previous study not available for comparison.  ______________________________________________________________________________  MMode/2D Measurements & Calculations     IVSd: 1.1 cm  LVIDd: 5.1 cm  LVIDs: 2.7 cm  LVPWd: 1.0 cm  FS: 46.5 %  LV mass(C)d: 197.6 grams  LV mass(C)dI: 104.1 grams/m2  Ao root diam: 4.0 cm  asc Aorta Diam: 3.3 cm  LVOT diam: 2.4 cm  LVOT area: 4.4 cm2  RWT: 0.40     Doppler Measurements & Calculations  MV E max mikki: 85.9 cm/sec  MV A max mikki: 71.8 cm/sec  MV E/A: 1.2  MV  dec slope: 460.5 cm/sec2  MV dec time: 0.19 sec  TR max mikki: 290.0 cm/sec  TR max P.6 mmHg  E/E' av.6  Lateral E/e': 5.9  Medial E/e': 7.2     ______________________________________________________________________________  Report approved by: MD Yosvany Ramos 2022 04:07 PM               Discharge Medications   Current Discharge Medication List      START taking these medications    Details   amylase-lipase-protease (CREON 24) 17171-45817 units CPEP per EC capsule Take 3 capsules by mouth 3 times daily (with meals)  Qty: 270 capsule, Refills: 0    Associated Diagnoses: Pancreatic insufficiency      glyBURIDE (DIABETA /MICRONASE) 2.5 MG tablet Take 1 tablet (2.5 mg) by mouth daily (with breakfast)  Qty: 30 tablet, Refills: 0    Associated Diagnoses: Type 2 diabetes mellitus with other specified complication, without long-term current use of insulin (H)         CONTINUE these medications which have CHANGED    Details   ferrous sulfate (FEROSUL) 325 (65 Fe) MG tablet Take 1 tablet (325 mg) by mouth daily (with breakfast)    Associated Diagnoses: Iron deficiency         CONTINUE these medications which have NOT CHANGED    Details   albuterol (PROAIR HFA/PROVENTIL HFA/VENTOLIN HFA) 108 (90 Base) MCG/ACT inhaler Inhale 2 puffs into the lungs every 6 hours as needed for shortness of breath / dyspnea or wheezing      folic acid (FOLVITE) 1 MG tablet Take 1 mg by mouth daily      gabapentin (NEURONTIN) 300 MG capsule Take 300 mg by mouth 3 times daily      levETIRAcetam (KEPPRA) 1000 MG tablet Take 2,000 mg by mouth 2 times daily      metFORMIN (GLUCOPHAGE XR) 500 MG 24 hr tablet Take 2,000 mg by mouth daily (with dinner)      multivitamin w/minerals (THERA-VIT-M) tablet Take 1 tablet by mouth daily      pantoprazole (PROTONIX) 40 MG EC tablet Take 40 mg by mouth daily      tamsulosin (FLOMAX) 0.4 MG capsule Take 0.4 mg by mouth daily      thiamine (B-1) 100 MG tablet Take 100 mg by mouth  daily         STOP taking these medications       amylase-lipase-protease (CREON 12) 22718-52427-32688 units CPEP Comments:   Reason for Stopping:         amylase-lipase-protease (CREON 6) 0870-06178-40457 units CPEP Comments:   Reason for Stopping:             Allergies   Allergies   Allergen Reactions     Lisinopril      Morphine

## 2022-12-25 NOTE — PROGRESS NOTES
Care Management Discharge Note    Discharge Date: 12/25/2022    Discharge Disposition: Home    Discharge Services: Other (see comment) (patient is going to set up OP PT in [place], [place])    Discharge DME: Walker (PT to issue prior to discharge)    Discharge Transportation: other (see comments) ([employer] bus)    Private pay costs discussed: Not applicable    PAS Confirmation Code:  (n/a)  Patient/family educated on Medicare website which has current facility and service quality ratings: no    Education Provided on the Discharge Plan:  yes  Persons Notified of Discharge Plans: patient  Patient/Family in Agreement with the Plan: yes    Handoff Referral Completed: No    Additional Information:    Spoke with patient by phone (SO was in the hospital room in the background) - report from unit is that patient is declining TCU and plans to return to his home with his SO at discharge today. Spoke with patient by phone and he endorsed that he and his SO are returning to their home near [place], [place] where his mother and their children live. He will pursue OP rehab services once they arrive back in [place]. He and his SO moved to [place] from [place] due to wife being prescribed Methadone and that not being available in [place] (per charting there are several different reasons that patient has provided about reason for move to [place] from [place]). Also they were able to get more financial assistance in the state [facility] than [place]. Now they are planning to stay in a hotel tonight (provided information for [employer] which has a shuttle to pick them up at the hospital, it is near the light rail so they can get to the [employer] station tomorrow, and they also service breakfast - reminded them to ask for the medical rate) and then take the [employer] bus home to [place], [place] tomorrow. They have no concerns about financial resources in [place] as their kids live there and also patient/SO's home is across the street from his mother's.     PT will be issuing a walker to  patient per request from medical staff.     Massiel Rosas LICSW MSW   12/25/2022       Social Work and Care Management Department       [facility]       [place] (0800 - 1630) Saturday and Sunday     Units: 4A, 4C, & 4E Pager: [phone]     Units: 5A & 5B Pager: [phone]     Units: 6A & 6B   Pager: [phone]     Units: 6C & 6D Pager: [phone]     Units: 7A &7B  Pager: [phone]     Units: 7C, 7D, & 5C Pager: [phone]     Unit: [place] ED Pager: [phone]      [facility] (1050-2326) Saturday and Sunday      Units: 5 Ortho, 5 Med/Surg & WB ED  Pager:[phone]     Units: 6 Med/Surg, 8A, & 10A ICU  Pager: [phone]        After hours (1630 - 0000) Saturday & Sunday; (2592-0970) Mon-Fri; (8944-3765) [facility] Recognized Holidays     Units: ALL  Pager: [phone]       .

## 2022-12-25 NOTE — PLAN OF CARE
Goal Outcome Evaluation:       Pt. A&Ox 4. VSS. Afebrile. Pt ambulates in room independently. Denies SOB, chest pain and nausea. PRN tylenol give for headache before bed. Able to makes need known. Wife is expected to visit today. Awaiting placement at TCU. Will continue with POC.

## 2022-12-26 NOTE — PLAN OF CARE
Occupational Therapy Discharge Summary    Reason for therapy discharge:    Discharged to home with outpatient therapy.    Progress towards therapy goal(s). See goals on Care Plan in [facility] electronic health record for goal details.  Goals partially met.  Barriers to achieving goals:   discharge from facility.    Therapy recommendation(s):    No further therapy is recommended.  Continue PT for high level balance

## 2022-12-26 NOTE — PLAN OF CARE
Physical Therapy Discharge Summary    Reason for therapy discharge:    Discharged to home with outpatient therapy.    Progress towards therapy goal(s). See goals on Care Plan in [facility] electronic health record for goal details.  Goals partially met.  Barriers to achieving goals:   discharge from facility.    Therapy recommendation(s):    Continued therapy is recommended.  Rationale/Recommendations:  progress activity to PLOF.